# Patient Record
Sex: FEMALE | Race: WHITE | NOT HISPANIC OR LATINO | Employment: OTHER | URBAN - METROPOLITAN AREA
[De-identification: names, ages, dates, MRNs, and addresses within clinical notes are randomized per-mention and may not be internally consistent; named-entity substitution may affect disease eponyms.]

---

## 2017-01-03 ENCOUNTER — HOSPITAL ENCOUNTER (OUTPATIENT)
Dept: GENERAL RADIOLOGY | Facility: HOSPITAL | Age: 67
Discharge: HOME | End: 2017-01-03
Attending: INTERNAL MEDICINE | Admitting: INTERNAL MEDICINE

## 2017-01-03 ENCOUNTER — OFFICE VISIT (OUTPATIENT)
Dept: INTERNAL MEDICINE | Facility: CLINIC | Age: 67
End: 2017-01-03

## 2017-01-03 VITALS
BODY MASS INDEX: 32.29 KG/M2 | SYSTOLIC BLOOD PRESSURE: 144 MMHG | WEIGHT: 218 LBS | DIASTOLIC BLOOD PRESSURE: 70 MMHG | HEIGHT: 69 IN

## 2017-01-03 DIAGNOSIS — S81.802A LEG WOUND, LEFT, INITIAL ENCOUNTER: ICD-10-CM

## 2017-01-03 DIAGNOSIS — I10 ESSENTIAL HYPERTENSION: ICD-10-CM

## 2017-01-03 DIAGNOSIS — S81.802A LEG WOUND, LEFT, INITIAL ENCOUNTER: Primary | ICD-10-CM

## 2017-01-03 DIAGNOSIS — R60.0 BILATERAL EDEMA OF LOWER EXTREMITY: ICD-10-CM

## 2017-01-03 PROCEDURE — 87186 SC STD MICRODIL/AGAR DIL: CPT | Performed by: INTERNAL MEDICINE

## 2017-01-03 PROCEDURE — 87070 CULTURE OTHR SPECIMN AEROBIC: CPT | Performed by: INTERNAL MEDICINE

## 2017-01-03 PROCEDURE — 73610 X-RAY EXAM OF ANKLE: CPT

## 2017-01-03 PROCEDURE — 87205 SMEAR GRAM STAIN: CPT | Performed by: INTERNAL MEDICINE

## 2017-01-03 PROCEDURE — 99214 OFFICE O/P EST MOD 30 MIN: CPT | Performed by: INTERNAL MEDICINE

## 2017-01-03 PROCEDURE — 87147 CULTURE TYPE IMMUNOLOGIC: CPT | Performed by: INTERNAL MEDICINE

## 2017-01-03 PROCEDURE — 73590 X-RAY EXAM OF LOWER LEG: CPT

## 2017-01-03 PROCEDURE — 87077 CULTURE AEROBIC IDENTIFY: CPT | Performed by: INTERNAL MEDICINE

## 2017-01-03 RX ORDER — SULFAMETHOXAZOLE AND TRIMETHOPRIM 800; 160 MG/1; MG/1
1 TABLET ORAL 2 TIMES DAILY
Qty: 28 TABLET | Refills: 0 | Status: SHIPPED | OUTPATIENT
Start: 2017-01-03 | End: 2017-01-17

## 2017-01-03 NOTE — MR AVS SNAPSHOT
Rosalie Amador   1/3/2017 9:15 AM   Office Visit    Dept Phone:  [phone]   Encounter #:  [id number]    Provider:  Lisset Godwin MD   Department:  [facility] INTERNAL MEDICINE AND ENDOCRINOLOGY [place]                Your Full Care Plan              Today's Medication Changes          These changes are accurate as of: 1/3/17 10:07 AM.  If you have any questions, ask your nurse or doctor.               New Medication(s)Ordered:     sulfamethoxazole-trimethoprim 800-160 MG per tablet   Commonly known as:  BACTRIM DS,SEPTRA DS   Take 1 tablet by mouth 2 (Two) Times a Day for 14 days.   Started by:  Lisset Godwin MD            Where to Get Your Medications      These medications were sent to [address] - [phone]  - [phone]   [address] [facility] [address]     Phone:  [phone]     sulfamethoxazole-trimethoprim 800-160 MG per tablet                  Your Updated Medication List          This list is accurate as of: 1/3/17 10:07 AM.  Always use your most recent med list.                aspirin 81 MG tablet       CALCIUM 500 + D PO       CoQ-10 200 MG capsule       lisinopril 20 MG tablet   Commonly known as:  PRINIVIL,ZESTRIL       MULTIVITAMINS PO       sulfamethoxazole-trimethoprim 800-160 MG per tablet   Commonly known as:  BACTRIM DS,SEPTRA DS   Take 1 tablet by mouth 2 (Two) Times a Day for 14 days.       Vitamin D3 1000 UNITS capsule               We Performed the Following     Ambulatory Referral to Wound Clinic     Wound Culture       You Were Diagnosed With        Codes Comments    Leg wound, left, initial encounter    -  Primary ICD-10-CM: S81.802A  ICD-9-CM: 894.0     Essential hypertension     ICD-10-CM: I10  ICD-9-CM: 401.9       Instructions     None    Patient Instructions History      Upcoming Appointments     Visit Type Date Time Department    FOLLOW UP  1/3/2017  9:15 AM [facility]    FOLLOW UP 2017  9:00 AM [facility]    SUBSEQUENT MEDICARE WELLNESS 2017 10:30 AM [facility]    OUTSIDE FACILITY 2017  9:30 AM [facility] [place]      MyChart Signup     [facility] boarding pass allows you to send messages to your doctor, view your test results, renew your prescriptions, schedule appointments, and more. To sign up, go to [employer] and click on the Sign Up Now link in the New User? box. Enter your boarding pass Activation Code exactly as it appears below along with the last four digits of your Social Security Number and your Date of Birth () to complete the sign-up process. If you do not sign up before the expiration date, you must request a new code.    boarding pass Activation Code: [email or web address]  Expires: 2017 10:07 AM    If you have questions, you can email [email or web address] or call [phone] to talk to our boarding pass staff. Remember, [employer] is NOT to be used for urgent needs. For medical emergencies, dial 911.               Other Info from Your Visit           Your Appointments     2017  9:00 AM EST   Follow Up with MAGUE Boyd   [facility] INTERNAL MEDICINE AND ENDOCRINOLOGY [place] (--)    [address]  [facility] [address] [phone]           Arrive 15 minutes prior to appointment.            2017 10:30 AM EST   Subsequent Medicare Wellness with Lisset Godwin MD   [facility] INTERNAL MEDICINE AND ENDOCRINOLOGY [place] (--)    [address]  [facility] [address]   [phone]            2017  9:30 AM EST   Outside Facility with Maciel Sanchez MD   [facility] GASTROENTEROLOGY (--)    [address] [facility] [address]  [facility] [address]   [phone]              Allergies     Benzonatate      Morphine And Related        Reason for Visit     Left ankle wound, swelling and redness           Vital Signs     Blood Pressure  "Height Weight Body Mass Index Smoking Status       144/70 69\" (175.3 cm) 218 lb (98.9 kg) 32.19 kg/m2 Never Smoker       Problems and Diagnoses Noted     High blood pressure    Wound of left leg      Results         "

## 2017-01-03 NOTE — PROGRESS NOTES
"Chief Complaint   Patient presents with   • Left ankle wound, swelling and redness       History of Present Illness  66 y.o.  woman presents for further evaluation of left ankle wound.  HPI started a few months ago, had redness at the inner left ankle, which broke open about 2 mos ago.  Has had some bloody drainage, has had increased redness, has had dev't of scab but wound is nonhealing.  Has applied neosporin and cleaned it.  Notes pain is decreased with leg elevation, antoine in the morning, but pain increases throughout the day.    Review of Systems  Had right wrist fx the week before Thanksgiving.  Fell raking leaves.  Saw Dr. Kinney, braced it, had f/u last week, and can take off brace next week.  All other ROS reviewed and negative.    [facility]  The following portions of the patient's history were reviewed and updated as appropriate: allergies, current medications, past family history, past medical history, past social history, past surgical history and problem list.      Current Outpatient Prescriptions:   •  aspirin 81 MG tablet, Take  by mouth daily., Disp: , Rfl:   •  Calcium Carbonate-Vitamin D (CALCIUM 500 + D PO), Take  by mouth daily., Disp: , Rfl:   •  Cholecalciferol (VITAMIN D3) 1000 UNITS capsule, Take 1,000 Units by mouth daily., Disp: , Rfl:   •  Coenzyme Q10 (COQ-10) 200 MG capsule, Take  by mouth daily., Disp: , Rfl:   •  lisinopril (PRINIVIL,ZESTRIL) 20 MG tablet, Take 20 mg by mouth daily., Disp: , Rfl:   •  Multiple Vitamin (MULTIVITAMINS PO), Take  by mouth daily., Disp: , Rfl:   •  sulfamethoxazole-trimethoprim (BACTRIM DS,SEPTRA DS) 800-160 MG per tablet, Take 1 tablet by mouth 2 (Two) Times a Day for 14 days., Disp: 28 tablet, Rfl: 0    Visit Vitals   • /70   • Ht 69\" (175.3 cm)   • Wt 218 lb (98.9 kg)   • BMI 32.19 kg/m2       Physical Exam   Constitutional: She is oriented to person, place, and time. She appears well-developed and well-nourished.   Cardiovascular: Normal " rate, regular rhythm and normal heart sounds.    Pulmonary/Chest: Effort normal and breath sounds normal.   Abdominal: Soft. Bowel sounds are normal.   Musculoskeletal:   Right wrist/forearm in brace; able to move all fingers FROM; sensation intact to light touch   Neurological: She is alert and oriented to person, place, and time.   Skin: There is erythema (medial left leg just above the medial malleolus with nickel-sized ulcer with yellow base, surrounding thick scab 3/4 circumference and 1-inch diameters of purplish erythema; no significant swelling but tender to palpation; no significant increased warmth; ).   No active drainage from wound   Psychiatric: She has a normal mood and affect. Her behavior is normal.   Nursing note and vitals reviewed.    LABS  Results for orders placed or performed in visit on 01/03/17   Wound Culture   Result Value Ref Range    Gram Stain Result Many (4+) WBCs seen     Gram Stain Result Few (2+) Gram positive cocci in pairs        ASSESSMENT/PLAN  Problem List Items Addressed This Visit     Hypertension     BP mildly elevated, possibly exacerbated by pain from left ankle wound; follow clinically         Bilateral edema of lower extremity     Note previous concern with venous insufficiency, now with concern for potential venous stasis ulcer L. medial lower leg; leg elevation as much as possible; plan for further eval after resolution of leg wound         Leg wound, left - Primary     Consider most likely due to venous stasis ulcer, ongoing for > 2 mos; wound cx taken; empiric abx tx with bactrim DS BID x 14 days; urgent referral to wound clinic for further eval;check xrays for 1st look to r/o osteomyelitis; leg elevation as possible; f/u in 2 weeks; consider vasc surg consult if does not start improving with more aggressive wound care         Relevant Medications    sulfamethoxazole-trimethoprim (BACTRIM DS,SEPTRA DS) 800-160 MG per tablet    Other Relevant Orders    Ambulatory  Referral to Wound Clinic    XR Tibia Fibula 2 View Left (Completed)    Wound Culture (Completed)          FOLLOW-UP  1. Health maintenance - flu vaccine 9/16  2. RTC for next wellness visit as scheduled 1/31/17; fasting labs the week prior to appt (CBC, CMP, TSH, lipids, UA/micro, A1C, microalb, vit D, FT4)      Electronically signed by:    iLsset Godwin MD  01/03/2017

## 2017-01-04 ENCOUNTER — HOSPITAL ENCOUNTER (OUTPATIENT)
Dept: PHYSICAL THERAPY | Facility: HOSPITAL | Age: 67
Setting detail: THERAPIES SERIES
Discharge: HOME | End: 2017-01-04

## 2017-01-04 DIAGNOSIS — L97.329 VENOUS STASIS ULCER OF ANKLE, LEFT (HCC): Primary | ICD-10-CM

## 2017-01-04 DIAGNOSIS — R60.0 BILATERAL LOWER EXTREMITY EDEMA: ICD-10-CM

## 2017-01-04 DIAGNOSIS — I83.023 VENOUS STASIS ULCER OF ANKLE, LEFT (HCC): Primary | ICD-10-CM

## 2017-01-04 PROCEDURE — G8990 OTHER PT/OT CURRENT STATUS: HCPCS

## 2017-01-04 PROCEDURE — G8991 OTHER PT/OT GOAL STATUS: HCPCS

## 2017-01-04 PROCEDURE — 97597 DBRDMT OPN WND 1ST 20 CM/<: CPT

## 2017-01-04 PROCEDURE — 97161 PT EVAL LOW COMPLEX 20 MIN: CPT

## 2017-01-04 PROCEDURE — 29581 APPL MULTLAYER CMPRN SYS LEG: CPT

## 2017-01-04 NOTE — MR AVS SNAPSHOT
Rosalie Amador   2017  9:00 AM   INITIAL EVALUATION - WOUND CARE    Dept Phone:  [phone]   Encounter #:  [id number]    Provider:  Doreen Howell, PT   Department:  [facility] OUTPATIENT PHYSICAL THERAPY                Your Full Care Plan              Your Updated Medication List      ASK your doctor about these medications     aspirin 81 MG tablet       CALCIUM 500 + D PO       CoQ-10 200 MG capsule       lisinopril 20 MG tablet   Commonly known as:  PRINIVIL,ZESTRIL       MULTIVITAMINS PO       sulfamethoxazole-trimethoprim 800-160 MG per tablet   Commonly known as:  BACTRIM DS,SEPTRA DS   Take 1 tablet by mouth 2 (Two) Times a Day for 14 days.       Vitamin D3 1000 UNITS capsule               Instructions     None    Patient Instructions History      Upcoming Appointments     Visit Type Date Time Department    INITIAL EVAL - WOUND CARE 2017  9:00 AM  [facility]    TREATMENT 2017  9:15 AM  [facility]    TREATMENT 2017 10:00 AM  [facility]    FOLLOW UP 2017  9:00 AM [facility]    SUBSEQUENT MEDICARE WELLNESS 2017 10:30 AM [facility]    OUTSIDE FACILITY 2017  9:30 AM [facility]      MyChart Signup     [facility] [employer] allows you to send messages to your doctor, view your test results, renew your prescriptions, schedule appointments, and more. To sign up, go to [email or web address] and click on the Sign Up Now link in the New User? box. Enter your [employer] Activation Code exactly as it appears below along with the last four digits of your Social Security Number and your Date of Birth () to complete the sign-up process. If you do not sign up before the expiration date, you must request a new code.    [employer] Activation Code: [email or web address]  Expires: 2017 10:07 AM    If you have questions, you can email [email or web address] or call [phone] to talk to our [employer] staff.  Remember, MyChart is NOT to be used for urgent needs. For medical emergencies, dial 911.               Other Info from Your Visit           Your Appointments     Jan 09, 2017  9:15 AM EST   Therapy Treatment with Doreen Howell, PT   [facility] OUTPATIENT PHYSICAL THERAPY ([place])    [address] [place] [address] [phone]            Jan 12, 2017 10:00 AM EST   Therapy Treatment with Analy Carnes, PT   [facility] OUTPATIENT PHYSICAL THERAPY ([address] [address] [phone]            Jan 17, 2017  9:00 AM EST   Follow Up with MAGUE Boyd   [facility] INTERNAL MEDICINE AND ENDOCRINOLOGY [place] (--)    [address] [address] [phone]           Arrive 15 minutes prior to appointment.            Jan 31, 2017 10:30 AM EST   Subsequent Medicare Wellness with Lisset Godwin MD   [facility] INTERNAL MEDICINE AND ENDOCRINOLOGY [place] (--)    [address] [facility] [address]  [facility] [address] [phone]            Feb 16, 2017  9:30 AM EST   Outside Facility with Maciel Sanchez MD   [facility] MEDICAL GROUP GASTROENTEROLOGY (--)    [address] [place] [address] [address]  [facility] [address]   [phone]              Allergies     Benzonatate      Morphine And Related        Vital Signs     Smoking Status                   Never Smoker

## 2017-01-04 NOTE — PROGRESS NOTES
Outpatient Rehabilitation - Wound/Debridement Initial Eval  [facility]     Patient Name: Rosalie Amador  : 1950  MRN: [id number]  Today's Date: 2017                Admit Date: 2017    Visit Dx:    ICD-10-CM ICD-9-CM   1. Venous stasis ulcer of ankle, left I83.023 454.0   2. Bilateral lower extremity edema R60.0 782.3       Patient Active Problem List   Diagnosis   • Impaired fasting glucose   • Vitamin D deficiency   • Scalp cyst   • Osteopenia   • Obesity   • Non-toxic multinodular goiter   • Uterine leiomyoma   • Hypertension   • Hyperlipidemia   • Hemorrhoids   • Carotid atherosclerosis   • Malignant neoplasm of right female breast   • Palpitations   • Ecchymosis   • Bilateral edema of lower extremity   • Leg wound, left        Past Medical History   Diagnosis Date   • Cancer    • Right wrist fracture 2016     ortho - Dr. Albin Kinney        Past Surgical History   Procedure Laterality Date   • Breast surgery               Patient History       17 0900          History    Chief Complaint Ulcer, wound or other skin condition;Pain  -      Type of Pain Ankle pain  -      Date Current Problem(s) Began 16  -      Brief Description of Current Complaint Approximately 2 months ago, pt noticed an open area on her L ankle, which has gotten worse despite her attempts to manage at home. Pt followed up with her PCP yesterday, who recommended PT wound care consult to attempt resolution. Pt also reports BLE edema that worsens throughout the day but mostly resolves overnight, LLE worse than RLE.   -      Previous treatment for THIS PROBLEM Medication   Pt is on oral abx.  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  -      Patient seeing anyone else  for problem(s)? Yes   PCP, Dr. Godwin  -      How has patient tried to help current problem? Pt treated with neosporin/bandages at first, then attempted to leave it open to air.  -      What clinical tests have you had for this problem? Other 1 (comment)   wound culture  -      Results of Clinical Tests In progress as of 01/04/2017  -      Related/Recent Hospitalizations No  -      Are you or can you be pregnant No  -      Pain     Pain Location Ankle  -      Pain at Present 5  -MC      Pain at Best 0  -      Pain at Worst 10  -MC      Pain Frequency Intermittent  -      Pain Description Burning  -      What Performance Factors Make the Current Problem(s) WORSE? Activity, tactile input  -      What Performance Factors Make the Current Problem(s) BETTER? rest, elevation  -      Is your sleep disturbed? Yes  -      Difficulties at work? Pt does not work. The area slightly limits her ability to help care for her mother.  -      Difficulties with ADL's? Self care  -      Fall Risk Assessment    Any falls in the past year: Yes  -      Number of falls reported in the last 12 months 1  -      Does patient have a fear of falling No  -      Previous Functional Level --   independent with all activities  -      Services    Are you currently receiving Home Health services No  -      Do you plan to receive Home Health services in the near future No  -      Daily Activities    Primary Language English  -      Are you able to read Yes  -      Are you able to write Yes  -      How does patient learn best? Listening;Demonstration  -      Teaching needs identified Management of Condition  -      Patient is concerned about/has problems with Difficulty with self care (i.e. bathing, dressing, toileting:;Performing home management (household chores, shopping, care of dependents);Performing job responsibilities/community activities (work, school,;Transfers (getting out of a chair,  "bed);Walking  -      Does patient have problems with the following? None  -MC      Barriers to learning None  -MC      Pt Participated in POC and Goals Yes  -MC      Safety    Are you being hurt, hit, or frightened by anyone at home or in your life? No  -MC      Are you being neglected by a caregiver No  -MC        User Key  (r) = Recorded By, (t) = Taken By, (c) = Cosigned By    Initials Name Provider Type    NISHANT Howell, PT Physical Therapist          EVALUATION            LDA Wound       01/04/17 0900          Wound 01/04/17 0900 Left medial ankle ulceration, venous    Wound - Properties Group Date first assessed: 01/04/17  - Time first assessed: 0900  - Side: Left  -MC Orientation: medial  -MC Location: ankle  -MC Type: ulceration, venous  -MC    Wound WDL ex   periwound redness, slight swelling  -MC      Dressing Appearance dry;intact  -MC      Base moist;pink;reddened;yellow;slough   min yellow slough after debridement  -      Periwound Area redness;ecchymotic;swelling  -MC      Edges open;irregular  -MC      Length (cm) 1.2   small area open distally to principle wound, 0.2 x 1 cm  -MC      Width (cm) 1.8  -MC      Depth (cm) 0.2   partially obscured by slough.  -MC      Drainage Characteristics/Odor serosanguineous;no odor  -MC      Drainage Amount moderate  -MC      Picture taken yes  -      Wound Cleaning cleansed with;other (see comments)   skintegrity  -      Wound Interventions debrided;honey  -      Dressing Dressing applied;foam;silver impregnated dressing;low-adherent   mepilex Ag foam, 4\" optifoam gentle border  -      Periwound Care cleansed with pH balanced cleanser;dry periwound area maintained  -        User Key  (r) = Recorded By, (t) = Taken By, (c) = Cosigned By    Initials Name Provider Type    NISHANT Howell, PT Physical Therapist              Lymphedema       01/04/17 0900          Subjective Comments    Subjective Comments Pt reports the edema is worse in " the evenings, and worse when she is up on her feet a lot. It's difficult for her to avoid being on her feet d/t caring for her mother.  -MC      Subjective Pain    Able to rate subjective pain? yes  -MC      Pre-Treatment Pain Level 5  -MC      Post-Treatment Pain Level 7  -      Lymphedema Edema Assessment    Ptting Edema Category By severity  -      Pitting Edema Mild   pt reports they are small because she hasn't been up long.  -      Skin Changes/Observations    Location/Assessment Lower Extremity  -      Lower Extremity Conditions right:;intact;bilateral:;clean;dry  -      Lower Extremity Color/Pigment left:;erythema;purple   around wound only. Remainder BLE: WNL  -MC      Lymphedema Sensation    Lymphedema Sensation Reports RLE:;LLE:   WNL  -      Lymphedema Pulses/Capillary Refill    Lymphedema Pulses/Capillary Refill lower extremity pulses;capillary refill  -      Dorsalis Pedis Pulse right:;left:;+1 diminished  -      Posterior Tibialis Pulse right:;left:;+1 diminished  -      Capillary Refill lower extremity capillary refill  -      Lower Extremity Capillary Refill right:;left:;less than 3 seconds  -      Lymphedema Measurements    Measurement Type(s) Quick Girth  -      Quick Girth Areas Lower extremities  -      LLE Quick Girth (cm)    Met-heads 22.5 cm  -MC      Mid foot 22.5 cm  -MC      Smallest ankle 25.3 cm  -MC      Largest calf 43.5 cm  -MC      Tib tuberosity 47.5 cm  -MC      RLE Quick Girth (cm)    Met-heads 22.7 cm  -MC      Mid foot 22.5 cm  -MC      Smallest ankle 24.7 cm  -MC      Largest calf 49.5 cm  -MC      Tib tuberosity 47.5 cm  -MC      Compression/Skin Care    Compression/Skin Care skin care;wrapping location;bandaging  -      Skin Care washed/dried  -MC      Wrapping Location lower extremity  -MC      Wrapping Location LE left:;foot to knee  -MC      Wrapping Comments Pt wished to hold wraps to RLE at this time.  -MC      Bandage Layers cotton elastic  stocking- single layer (comment size)   size 4 MH/ankle dbl over foot, size 5 ankle/calf  -        User Key  (r) = Recorded By, (t) = Taken By, (c) = Cosigned By    Initials Name Provider Type    NISHANT Howell, PT Physical Therapist          WOUND DEBRIDEMENT  Debridement Site 1  Location- Site 1: L medial ankle  Selective Debridement- Site 1: Wound Surface <20cmsq  Instruments- Site 1: #15, scapel, tweezers  Necrotic Tissue- Site 1: 100 % Pre  Excised Tissue Description- Site 1: moderate, slough, other (comment) (hypertrophic crust)  Residual Necrotic Tissue- Site 1: 15 % post  Bleeding- Site 1: none                 Recommendation and Plan        PT Assessment/Plan       01/04/17 0900          PT Assessment    Functional Limitations Performance in self-care ADL;Limitations in functional capacity and performance;Performance in leisure activities;Limitations in community activities  -      Impairments Integumentary integrity;Pain  -      Assessment Comments Pt presents with a venous stasis ulcer to the L medial ankle x2 months with worsening periwound area. Pt has a wound culture in progress taken by the MD on 01/03/2017. After debridement, the wound bed is about 85% red/pink tissue and about 15% adherent yellow slough, with remaining debridement limited by pain. Pt also with BLE edema that worsens throughout the day and improves overnight/with prolonged elevation. Pt may benefit from light compression to avoid pooling of fluid in the BLE and to promote venous return. Pt will benefit from skilled PT wound care for debridement of necrotic tissue and advanced dressings management.  -      Rehab Potential Good  -      Patient/caregiver participated in establishment of treatment plan and goals Yes  -      Patient would benefit from skilled therapy intervention Yes  -      PT Plan    PT Frequency 2x/week;3x/week  -      Predicted Duration of Therapy Intervention (days/wks) 8 weeks  -      Planned  CPT's? PT EVAL: 05022;PT THER PROC EA 15 MIN: 97638;PT JASON DEBRIDE OPEN WOUND UP TO 20 CM: 93510;PT MULTI LAYER COMP SYS LE;PT NLFU MIST: 22928;PT THER SUPP EA 15 MIN  -      Physical Therapy Interventions (Optional Details) patient/family education;wound care  -      PT Plan Comments Debridement, MLW to LLE 2x/week. Add RLE MLW as needed/appropriate.  -        User Key  (r) = Recorded By, (t) = Taken By, (c) = Cosigned By    Initials Name Provider Type     Doreen Howell, PT Physical Therapist            Goals      First Last   PT Goal Re-Cert Due Date: 17  PT Goal Re-Cert Due Date: 17   PT Short Term Goals  STG 1: Patient and/ or caregiver able to verbalize signs and symptoms of infection.  STG 2: Decrease wound dimensions by 25% as evidence of wound closure.  STG 3: Decrease non-viable / necrotic tissue by 90% to facilitate clean wound bed for healing.  STG 4: Patient to report decrease in pain to 4/10, to facilitate improved functional mobility.  STG 5: Patient independent and compliant with initial home exercise program focused on range of motion, and flexibility to improve blood flow to facilitate healing. PT Short Term Goals  STG 1: Patient and/ or caregiver able to verbalize signs and symptoms of infection.  STG 2: Decrease wound dimensions by 25% as evidence of wound closure.  STG 3: Decrease non-viable / necrotic tissue by 90% to facilitate clean wound bed for healing.  STG 4: Patient to report decrease in pain to 4/10, to facilitate improved functional mobility.  STG 5: Patient independent and compliant with initial home exercise program focused on range of motion, and flexibility to improve blood flow to facilitate healing.   Long Term Goals  LTG 1: Patient and/ or caregiver independent with clean dressing changes.  LTG 2: Decrease wound dimensions by 75% as evidence of wound closure.  LTG 3: Patient to report decrease in pain to 2/10, to facilitate improved functional  mobility.  LTG 4: Patient independent and compliant with use and care of compression stockings, with assistance of family / caregiver as indicated to promote self-care independence.  Long Term Goals  LTG 1: Patient and/ or caregiver independent with clean dressing changes.  LTG 2: Decrease wound dimensions by 75% as evidence of wound closure.  LTG 3: Patient to report decrease in pain to 2/10, to facilitate improved functional mobility.  LTG 4: Patient independent and compliant with use and care of compression stockings, with assistance of family / caregiver as indicated to promote self-care independence.                   PT OP Goals       01/04/17 0900          PT Short Term Goals    STG 1 Patient and/ or caregiver able to verbalize signs and symptoms of infection.  -      STG 2 Decrease wound dimensions by 25% as evidence of wound closure.  -      STG 3 Decrease non-viable / necrotic tissue by 90% to facilitate clean wound bed for healing.  -      STG 4 Patient to report decrease in pain to 4/10, to facilitate improved functional mobility.  -      STG 5 Patient independent and compliant with initial home exercise program focused on range of motion, and flexibility to improve blood flow to facilitate healing.  -      Long Term Goals    LTG 1 Patient and/ or caregiver independent with clean dressing changes.  -      LTG 2 Decrease wound dimensions by 75% as evidence of wound closure.  -      LTG 3 Patient to report decrease in pain to 2/10, to facilitate improved functional mobility.  -      LTG 4 Patient independent and compliant with use and care of compression stockings, with assistance of family / caregiver as indicated to promote self-care independence.  -      Time Calculation    PT Goal Re-Cert Due Date 02/03/17  -        User Key  (r) = Recorded By, (t) = Taken By, (c) = Cosigned By    Initials Name Provider Type    NISHANT Howell, PT Physical Therapist          Time Calculation:  Start Time: 0900  Total Timed Code Minutes- PT: 15 minute(s)    Therapy Charges for Today     Code Description Service Date Service Provider Modifiers Qty    54216889261 HC PT OTHER PRIME FUNCT CURRENT 1/4/2017 Doreen Howell, PT GP, CI 1    98523046101 HC PT OTHER PRIME FUNCT PROJECTED 1/4/2017 Doreen Howell, PT GP, CH 1    22676691422 HC PT EVAL LOW COMPLEXITY 4 1/4/2017 Doreen Howell, PT GP 1    52505609513 HC JASON DEBRIDE OPEN WOUND UP TO 20CM 1/4/2017 Doreen Howell, PT GP 1    41262813514 HC PT MULTI LAYER COMP SYS BELOW KNEE 1/4/2017 Doreen Howell, PT GP 1    40817988479 HC PT THER SUPP EA 15 MIN 1/4/2017 Doreen Howell, PT GP 1          PT G-Codes  PT Professional Judgement Used?: Yes  Outcome Measure Options: Lower Extremity Functional Scale (LEFS)  Score: 70/80  Functional Limitation: Other PT primary  Other PT Primary Current Status (): At least 1 percent but less than 20 percent impaired, limited or restricted  Other PT Primary Goal Status (): 0 percent impaired, limited or restricted     Doreen Howell, PT  1/4/2017

## 2017-01-04 NOTE — PROGRESS NOTES
Arthritis changes seen in the midfoot but no other abnormalities seen.  Proceed with plans for wound clinic, abx, and leg elevation as discussed.

## 2017-01-04 NOTE — ASSESSMENT & PLAN NOTE
Consider most likely due to venous stasis ulcer, ongoing for > 2 mos; wound cx taken; empiric abx tx with bactrim DS BID x 14 days; urgent referral to wound clinic for further eval;check xrays for 1st look to r/o osteomyelitis; leg elevation as possible; f/u in 2 weeks; consider vasc surg consult if does not start improving with more aggressive wound care

## 2017-01-04 NOTE — ASSESSMENT & PLAN NOTE
Note previous concern with venous insufficiency, now with concern for potential venous stasis ulcer L. medial lower leg; leg elevation as much as possible; plan for further eval after resolution of leg wound

## 2017-01-07 LAB
BACTERIA SPEC AEROBE CULT: ABNORMAL
GRAM STN SPEC: ABNORMAL
GRAM STN SPEC: ABNORMAL

## 2017-01-09 ENCOUNTER — APPOINTMENT (OUTPATIENT)
Dept: PHYSICAL THERAPY | Facility: HOSPITAL | Age: 67
End: 2017-01-09

## 2017-01-09 NOTE — PROGRESS NOTES
Wound cx showed light growth of bacteria commonly found on the skin; it is responsive to bactrim so please continue and finish abx course.    Has wound clinic been setup? Is wound stabilized and/or getting better?

## 2017-01-12 ENCOUNTER — HOSPITAL ENCOUNTER (OUTPATIENT)
Dept: PHYSICAL THERAPY | Facility: HOSPITAL | Age: 67
Setting detail: THERAPIES SERIES
Discharge: HOME | End: 2017-01-12

## 2017-01-12 DIAGNOSIS — R60.0 BILATERAL LOWER EXTREMITY EDEMA: ICD-10-CM

## 2017-01-12 DIAGNOSIS — L97.329 VENOUS STASIS ULCER OF ANKLE, LEFT (HCC): Primary | ICD-10-CM

## 2017-01-12 DIAGNOSIS — I83.023 VENOUS STASIS ULCER OF ANKLE, LEFT (HCC): Primary | ICD-10-CM

## 2017-01-12 PROCEDURE — 97597 DBRDMT OPN WND 1ST 20 CM/<: CPT

## 2017-01-12 PROCEDURE — 29581 APPL MULTLAYER CMPRN SYS LEG: CPT

## 2017-01-12 NOTE — PROGRESS NOTES
Outpatient Rehabilitation - Wound/Debridement Treatment Note  [facility]     Patient Name: Rosalie Amador  : 1950  MRN: [id number]  Today's Date: 2017                    Admit Date: 2017    Visit Dx:    ICD-10-CM ICD-9-CM   1. Venous stasis ulcer of ankle, left I83.023 454.0   2. Bilateral lower extremity edema R60.0 782.3       Patient Active Problem List   Diagnosis   • Impaired fasting glucose   • Vitamin D deficiency   • Scalp cyst   • Osteopenia   • Obesity   • Non-toxic multinodular goiter   • Uterine leiomyoma   • Hypertension   • Hyperlipidemia   • Hemorrhoids   • Carotid atherosclerosis   • Malignant neoplasm of right female breast   • Palpitations   • Ecchymosis   • Bilateral edema of lower extremity   • Leg wound, left        Past Medical History   Diagnosis Date   • Cancer    • Right wrist fracture 2016     ortho - Dr. Albin Kinney        Past Surgical History   Procedure Laterality Date   • Breast surgery           EVALUATION        PT Ortho       17 1000    Subjective Comments    Subjective Comments Pt reports her leg feels a lot better since wearing compressogrip stockings.  She states she was not able to change the dressing due to a family emergency, and current dressing has been in place for 3 days.  -JM    Subjective Pain    Able to rate subjective pain? yes  -BOB    Pre-Treatment Pain Level 3  -    Post-Treatment Pain Level 5  -JM    Transfers    Transfer, Comment Pt long sitting on stretcher for tx.  -      User Key  (r) = Recorded By, (t) = Taken By, (c) = Cosigned By    Initials Name Provider Type    BOB Carnes, PT Physical Therapist                    LDA Wound       17 1000          Wound 17 0900 Left medial ankle ulceration, venous    Wound - Properties Group Date first assessed: 17  - Time first assessed: 900  - Side: Left  - Orientation: medial  - Location: ankle  - Type: ulceration, venous  -MC    Wound WDL ex  "  periwound redness, slight swelling  -      Dressing Appearance dry;intact  -      Base moist;pink;reddened;yellow;slough  -      Periwound Area redness;ecchymotic;swelling  -      Edges open;irregular  -JM      Length (cm) 0.7  -JM      Width (cm) 1.3  -JM      Depth (cm) 0.1  -JM      Drainage Characteristics/Odor serosanguineous;no odor  -      Drainage Amount moderate  -      Picture taken yes  -      Wound Cleaning cleansed with;other (see comments);irrigated with;sterile normal saline   Phase One wound  after debridement  -      Wound Interventions debrided;honey  -      Dressing Dressing applied;foam;silver impregnated dressing   mepilex ag foam, 4\" optifoam gentle  -      Periwound Care cleansed with pH balanced cleanser;dry periwound area maintained;moisturizer applied  -        User Key  (r) = Recorded By, (t) = Taken By, (c) = Cosigned By    Initials Name Provider Type     Doreen Howell, PT Physical Therapist     Analy Carnes, PT Physical Therapist              Lymphedema       01/12/17 1000          Lymphedema Edema Assessment    Ptting Edema Category By severity  -      Pitting Edema Mild  -      Skin Changes/Observations    Location/Assessment Lower Extremity  -      Lower Extremity Color/Pigment left:;erythema;purple  -      Lymphedema Pulses/Capillary Refill    Lower Extremity Capillary Refill right:;left:;less than 3 seconds  -      LLE Quick Girth (cm)    Met-heads 22 cm  -JM      Mid foot 22.5 cm  -      Smallest ankle 21.5 cm  -      Largest calf 43.3 cm  -      Tib tuberosity 45.8 cm  -      RLE Quick Girth (cm)    Met-heads 22.6 cm  -      Mid foot 21.6 cm  -JM      Smallest ankle 24.2 cm  -      Largest calf 46 cm  -      Tib tuberosity 46 cm  -      Compression/Skin Care    Compression/Skin Care skin care;wrapping location;bandaging  -      Skin Care washed/dried;lotion applied  -      Wrapping Location lower extremity  " -      Wrapping Location LE bilateral:;foot to knee  -      Bandage Layers cotton elastic stocking- single layer (comment size)   size 5 on calf, size 4 on foot/ankle, doubled on dorsum/ank  -        User Key  (r) = Recorded By, (t) = Taken By, (c) = Cosigned By    Initials Name Provider Type    BOB Carnes, PT Physical Therapist          WOUND DEBRIDEMENT  Debridement Site 1  Location- Site 1: L medial ankle  Selective Debridement- Site 1: Wound Surface <20cmsq  Instruments- Site 1: tweezers  Necrotic Tissue- Site 1: 80 % Pre  Excised Tissue Description- Site 1: moderate, slough  Residual Necrotic Tissue- Site 1: 20 % post  Bleeding- Site 1: none             Recommendation and Plan        PT Assessment/Plan       01/12/17 1000          PT Assessment    Functional Limitations Performance in self-care ADL;Limitations in functional capacity and performance;Performance in leisure activities;Limitations in community activities  -      Impairments Integumentary integrity;Pain  -      Assessment Comments Patient with improved pain and improvement in wound appearance, decrease in wound area.  Pt tolerating use of compressogrip stockings.  Pt would benefit from knee high compression stockings for BLE venous stasis.  PT recommended obtaining MD prescription for knee high stockings as insurance will typically pay for stockings if pt has an open venous ulcer.  -      Rehab Potential Good  -      Patient/caregiver participated in establishment of treatment plan and goals Yes  -      Patient would benefit from skilled therapy intervention Yes  -      PT Plan    PT Frequency 2x/week  -      Physical Therapy Interventions (Optional Details) patient/family education;wound care  -      PT Plan Comments L ankle debridement, MLW to BLE  -        User Key  (r) = Recorded By, (t) = Taken By, (c) = Cosigned By    Initials Name Provider Type    BOB Carnes, PT Physical Therapist          Goals         PT OP Goals       01/12/17 1000 01/04/17 0900       PT Short Term Goals    STG 1  Patient and/ or caregiver able to verbalize signs and symptoms of infection.  -     STG 2  Decrease wound dimensions by 25% as evidence of wound closure.  -MC     STG 3  Decrease non-viable / necrotic tissue by 90% to facilitate clean wound bed for healing.  -     STG 4  Patient to report decrease in pain to 4/10, to facilitate improved functional mobility.  -     STG 5  Patient independent and compliant with initial home exercise program focused on range of motion, and flexibility to improve blood flow to facilitate healing.  -     Long Term Goals    LTG 1  Patient and/ or caregiver independent with clean dressing changes.  -MC     LTG 2  Decrease wound dimensions by 75% as evidence of wound closure.  -     LTG 3  Patient to report decrease in pain to 2/10, to facilitate improved functional mobility.  -     LTG 4  Patient independent and compliant with use and care of compression stockings, with assistance of family / caregiver as indicated to promote self-care independence.  -     Time Calculation    PT Goal Re-Cert Due Date 02/03/17  -BOB 02/03/17  -       User Key  (r) = Recorded By, (t) = Taken By, (c) = Cosigned By    Initials Name Provider Type    NISHANT Howell, PT Physical Therapist    BOB Carnes, PT Physical Therapist          PT Goal Re-Cert Due Date: 02/03/17            Time Calculation: Start Time: 1000    Therapy Charges for Today     Code Description Service Date Service Provider Modifiers Qty    46059815646  PT MULTI LAYER COMP SYS BELOW KNEE 1/12/2017 Analy Carnes, PT GP 1    [id number]  JASON DEBRIDE OPEN WOUND UP TO 20CM 1/12/2017 Analy Carnes, PT 58, GP 1    [id number]  PT THER SUPP EA 15 MIN 1/12/2017 Analy Carnes, PT GP 1                Analy Carnes, PT  1/12/2017

## 2017-01-12 NOTE — MR AVS SNAPSHOT
Rosalie Amador   2017 10:00 AM   Therapy Treatment    Dept Phone:  [phone]   Encounter #:  [id number]    Provider:  Analy Carnes PT   Department:  [facility] OUTPATIENT PHYSICAL THERAPY                Your Full Care Plan              Your Updated Medication List      ASK your doctor about these medications     aspirin 81 MG tablet       CALCIUM 500 + D PO       CoQ-10 200 MG capsule       lisinopril 20 MG tablet   Commonly known as:  PRINIVIL,ZESTRIL       MULTIVITAMINS PO       sulfamethoxazole-trimethoprim 800-160 MG per tablet   Commonly known as:  BACTRIM DS,SEPTRA DS   Take 1 tablet by mouth 2 (Two) Times a Day for 14 days.       Vitamin D3 1000 UNITS capsule               Instructions     None    Patient Instructions History      Upcoming Appointments     Visit Type Date Time Department    TREATMENT 2017 10:00 AM  [facility]    FOLLOW UP 2017  9:00 AM [facility]    TREATMENT 2017 10:30 AM  [facility]    TREATMENT 2017 10:30 AM  [facility]    SUBSEQUENT MEDICARE WELLNESS 2017 10:30 AM [facility] [place]    OUTSIDE FACILITY 2017  9:30 AM [facility] [place]      MyChart Signup     [facility] [employer] allows you to send messages to your doctor, view your test results, renew your prescriptions, schedule appointments, and more. To sign up, go to [email or web address] and click on the Sign Up Now link in the New User? box. Enter your [employer] Activation Code exactly as it appears below along with the last four digits of your Social Security Number and your Date of Birth () to complete the sign-up process. If you do not sign up before the expiration date, you must request a new code.    [employer]ves Activation Code: [email or web address]  Expires: 2017  5:36 AM    If you have questions, you can email [email or web address] or call [phone] to talk to our [employer] staff. Remember, [employer] is NOT to  be used for urgent needs. For medical emergencies, dial 911.               Other Info from Your Visit           Your Appointments     Jan 17, 2017  9:00 AM EST   Follow Up with MAGUE Boyd   [facility] INTERNAL MEDICINE AND ENDOCRINOLOGY [place] (--)    [address]  [facility] [address] [phone]           Arrive 15 minutes prior to appointment.            Jan 17, 2017 10:30 AM EST   Therapy Treatment with Analy Carnes, PT   [facility] OUTPATIENT PHYSICAL THERAPY ([place])    [address] [place], [place] [address] [phone]            Jan 19, 2017 10:30 AM EST   Therapy Treatment with Analy Carnes, PT   [facility] OUTPATIENT PHYSICAL THERAPY ([place])    [address] [place] [address] [phone]            Jan 31, 2017 10:30 AM EST   Subsequent Medicare Wellness with Lisset Godwin MD   [facility] INTERNAL MEDICINE AND ENDOCRINOLOGY [place] (--)    [address]  [facility] [address] [phone]            Feb 16, 2017  9:30 AM EST   Outside Facility with Maciel Sanchez MD   [facility] MEDICAL GROUP GASTROENTEROLOGY (--)    [address]  [facility] [address]   [phone]              Allergies     Benzonatate      Morphine And Related        Vital Signs     Smoking Status                   Never Smoker

## 2017-01-17 ENCOUNTER — HOSPITAL ENCOUNTER (OUTPATIENT)
Dept: PHYSICAL THERAPY | Facility: HOSPITAL | Age: 67
Setting detail: THERAPIES SERIES
Discharge: HOME | End: 2017-01-17

## 2017-01-17 DIAGNOSIS — L97.329 VENOUS STASIS ULCER OF ANKLE, LEFT (HCC): Primary | ICD-10-CM

## 2017-01-17 DIAGNOSIS — I83.023 VENOUS STASIS ULCER OF ANKLE, LEFT (HCC): Primary | ICD-10-CM

## 2017-01-17 DIAGNOSIS — R60.0 BILATERAL LOWER EXTREMITY EDEMA: ICD-10-CM

## 2017-01-17 PROCEDURE — 97597 DBRDMT OPN WND 1ST 20 CM/<: CPT

## 2017-01-17 PROCEDURE — 29581 APPL MULTLAYER CMPRN SYS LEG: CPT

## 2017-01-17 NOTE — PROGRESS NOTES
Outpatient Rehabilitation - Wound/Debridement Treatment Note  [facility]     Patient Name: Rosalie Amador  : 1950  MRN: [id number]  Today's Date: 2017                    Admit Date: 2017    Visit Dx:    ICD-10-CM ICD-9-CM   1. Venous stasis ulcer of ankle, left I83.023 454.0   2. Bilateral lower extremity edema R60.0 782.3       Patient Active Problem List   Diagnosis   • Impaired fasting glucose   • Vitamin D deficiency   • Scalp cyst   • Osteopenia   • Obesity   • Non-toxic multinodular goiter   • Uterine leiomyoma   • Hypertension   • Hyperlipidemia   • Hemorrhoids   • Carotid atherosclerosis   • Malignant neoplasm of right female breast   • Palpitations   • Ecchymosis   • Bilateral edema of lower extremity   • Leg wound, left        Past Medical History   Diagnosis Date   • Cancer    • Right wrist fracture 2016     ortho - Dr. Albin Kinney        Past Surgical History   Procedure Laterality Date   • Breast surgery           EVALUATION        PT Ortho       17 1040    Subjective Comments    Subjective Comments Pt states her legs are feeling better since using compressogrip.  She reports she has a follow-up appointment with her PCP next week, will try to obtain a script for compression stockings to take to [facility].  Otherwise, no new complaints.  States she is having periods where she is pain-free in the left leg.  -BOB    Subjective Pain    Able to rate subjective pain? yes  -BOB    Pre-Treatment Pain Level 2  -BOB    Post-Treatment Pain Level 5  -BOB    Transfers    Transfer, Comment Pt long sitting on stretcher for tx.  -      User Key  (r) = Recorded By, (t) = Taken By, (c) = Cosigned By    Initials Name Provider Type    BOB Carnes, PT Physical Therapist                    [facility] Wound       17 1040          Wound 17 0900 Left medial ankle ulceration, venous    Wound - Properties Group Date first assessed: 17  - Time first assessed: 900  - Side:  "Left  - Orientation: medial  - Location: ankle  - Type: ulceration, venous  -    Wound WDL ex   periwound redness, slight swelling  -      Dressing Appearance dry;intact  -JM      Base moist;pink;reddened;yellow;slough;epithelialization   biofilm on surface, fibrous yellow tissue in base  -JM      Periwound Area redness;ecchymotic;swelling  -JM      Edges open;irregular  -JM      Length (cm) 0.9  -JM      Width (cm) 1.4  -JM      Depth (cm) 0.4   apparent increase due to debriding deeper aspect of wound  -JM      Drainage Characteristics/Odor serosanguineous;no odor  -JM      Drainage Amount small  -JM      Picture taken yes  -JM      Wound Cleaning cleansed with;other (see comments)   Phase One wound   -      Wound Interventions debrided;honey  -      Dressing Dressing applied;foam;silver impregnated dressing;low-adherent   mepilex ag foam, 4\" optifoam gentle border  -JM      Periwound Care cleansed with pH balanced cleanser;dry periwound area maintained  -        User Key  (r) = Recorded By, (t) = Taken By, (c) = Cosigned By    Initials Name Provider Type     Doreen Howell, PT Physical Therapist    JM Analy Carnes, PT Physical Therapist              Lymphedema       01/17/17 1040          Lymphedema Edema Assessment    Ptting Edema Category By severity  -      Pitting Edema Mild  -      Skin Changes/Observations    Lower Extremity Conditions bilateral:;clean;dry  -      Lower Extremity Color/Pigment left:;erythema;purple  -      Lymphedema Pulses/Capillary Refill    Lower Extremity Capillary Refill left:;less than 3 seconds  -      Compression/Skin Care    Skin Care washed/dried;lotion applied  -      Wrapping Location lower extremity  -      Wrapping Location LE left:;foot to knee  -      Bandage Layers cotton elastic stocking- single layer (comment size);cotton elastic stocking- double layer (comment size)   size 5 single on calf, size 4 double on foot/ankle  -JM "        User Key  (r) = Recorded By, (t) = Taken By, (c) = Cosigned By    Initials Name Provider Type    BOB Carnes, PT Physical Therapist          WOUND DEBRIDEMENT  Debridement Site 1  Location- Site 1: L medial ankle  Selective Debridement- Site 1: Wound Surface <20cmsq  Instruments- Site 1: tweezers, #15, scapel  Necrotic Tissue- Site 1: 80 % Pre  Excised Tissue Description- Site 1: moderate, slough  Residual Necrotic Tissue- Site 1: 40 % post (fibrous yellow tissue in base after biofilm debrided)  Bleeding- Site 1: seeping, held pressure, 1 minute             Recommendation and Plan        PT Assessment/Plan       01/17/17 1040 01/12/17 1000       PT Assessment    Functional Limitations Performance in self-care ADL;Limitations in functional capacity and performance;Performance in leisure activities;Limitations in community activities  - Performance in self-care ADL;Limitations in functional capacity and performance;Performance in leisure activities;Limitations in community activities  -     Impairments Integumentary integrity;Pain  - Integumentary integrity;Pain  -     Assessment Comments PT able to debride more slough today, pt still very painful but tolerant with rest breaks.  Posterior aspect is clean and pink with new epithelial growth, anterior aspect still deep with yellow fibrous tissue in base.  Pt making good progress with current tx.  Is to follow up with PCP next week and request script for compression stockings.  -BOB Patient with improved pain and improvement in wound appearance, decrease in wound area.  Pt tolerating use of compressogrip stockings.  Pt would benefit from knee high compression stockings for BLE venous stasis.  PT recommended obtaining MD prescription for knee high stockings as insurance will typically pay for stockings if pt has an open venous ulcer.  -BOB     Rehab Potential Good  -BOB Good  -BOB     Patient/caregiver participated in establishment of treatment plan  and goals Yes  -JM Yes  -JM     Patient would benefit from skilled therapy intervention Yes  -JM Yes  -JM     PT Plan    PT Frequency 2x/week  -JM 2x/week  -JM     Planned CPT's? PT JASON DEBRIDE OPEN WOUND UP TO 20 CM: 35439;PT MULTI LAYER COMP SYS LE;PT THER SUPP EA 15 MIN  -BOB      Physical Therapy Interventions (Optional Details) patient/family education;wound care  - patient/family education;wound care  -     PT Plan Comments Pt is caring for her mother and reports she may not be able to keep Tuesday's appt but will call to cancel if needed.  -JM L ankle debridement, MLW to BLE  -JM       User Key  (r) = Recorded By, (t) = Taken By, (c) = Cosigned By    Initials Name Provider Type    BOB Carnes, PT Physical Therapist          Goals        PT OP Goals       17 1040 17 1000 17 0900    PT Short Term Goals    STG 1   Patient and/ or caregiver able to verbalize signs and symptoms of infection.  -    STG 2   Decrease wound dimensions by 25% as evidence of wound closure.  -    STG 3   Decrease non-viable / necrotic tissue by 90% to facilitate clean wound bed for healing.  -    STG 4   Patient to report decrease in pain to 4/10, to facilitate improved functional mobility.  -    STG 5   Patient independent and compliant with initial home exercise program focused on range of motion, and flexibility to improve blood flow to facilitate healing.  -    Long Term Goals    LTG 1   Patient and/ or caregiver independent with clean dressing changes.  -MC    LTG 2   Decrease wound dimensions by 75% as evidence of wound closure.  -    LTG 3   Patient to report decrease in pain to 2/10, to facilitate improved functional mobility.  -    LTG 4   Patient independent and compliant with use and care of compression stockings, with assistance of family / caregiver as indicated to promote self-care independence.  -    Time Calculation    PT Goal Re-Cert Due Date 17  -JM 17   -BOB 02/03/17  -      User Key  (r) = Recorded By, (t) = Taken By, (c) = Cosigned By    Initials Name Provider Type    NISHANT Howell, PT Physical Therapist    BOB Carnes, PT Physical Therapist          PT Goal Re-Cert Due Date: 02/03/17            Time Calculation: Start Time: 1040    Therapy Charges for Today     Code Description Service Date Service Provider Modifiers Qty    76336436399 HC PT MULTI LAYER COMP SYS BELOW KNEE 1/17/2017 Analy Carnes, PT GP 1    09892308913 HC JASON DEBRIDE OPEN WOUND UP TO 20CM 1/17/2017 Analy Carnes, PT 58, GP 1    25892208471 HC PT THER SUPP EA 15 MIN 1/17/2017 Analy Carnes, PT GP 1                Analy Carnes, PT  1/17/2017

## 2017-01-17 NOTE — MR AVS SNAPSHOT
Rosalie Amador   2017 10:30 AM   Therapy Treatment    Dept Phone:  [phone]   Encounter #:  [id number]    Provider:  Analy Carnes, PT   Department:  [facility] OUTPATIENT PHYSICAL THERAPY                Your Full Care Plan              Your Updated Medication List      ASK your doctor about these medications     aspirin 81 MG tablet       CALCIUM 500 + D PO       CoQ-10 200 MG capsule       lisinopril 20 MG tablet   Commonly known as:  PRINIVIL,ZESTRIL       MULTIVITAMINS PO       sulfamethoxazole-trimethoprim 800-160 MG per tablet   Commonly known as:  BACTRIM DS,SEPTRA DS   Take 1 tablet by mouth 2 (Two) Times a Day for 14 days.       Vitamin D3 1000 UNITS capsule               Instructions     None    Patient Instructions History      Upcoming Appointments     Visit Type Date Time Department    TREATMENT 2017 10:30 AM  [facility]    TREATMENT 2017 10:30 AM  [facility]    TREATMENT 2017  2:45 PM  [facility]    FOLLOW UP 2017 10:45 AM [facility]    TREATMENT 2017  2:00 PM  [facility]    SUBSEQUENT MEDICARE WELLNESS 2017 10:30 AM [facility]    OUTSIDE FACILITY 2017  9:30 AM [facility] [place]      MyChart Signup     [facility] [employer] allows you to send messages to your doctor, view your test results, renew your prescriptions, schedule appointments, and more. To sign up, go to [email or web address] and click on the Sign Up Now link in the New User? box. Enter your [employer] Activation Code exactly as it appears below along with the last four digits of your Social Security Number and your Date of Birth () to complete the sign-up process. If you do not sign up before the expiration date, you must request a new code.    [employer] Activation Code: [email or web address]  Expires: 2017  5:36 AM    If you have questions, you can email [email or web address] or call [phone] to talk  to our MyChart staff. Remember, [employer] is NOT to be used for urgent needs. For medical emergencies, dial 911.               Other Info from Your Visit           Your Appointments     Jan 19, 2017 10:30 AM EST   Therapy Treatment with Analy Carnes, PT   [facility] OUTPATIENT PHYSICAL THERAPY ([place])    [address] [place], [place] [address] [phone]            Jan 24, 2017  2:45 PM EST   Therapy Treatment with Analy Carnes PT   [facility] OUTPATIENT PHYSICAL THERAPY ([place] [id number] [phone]            Jan 26, 2017 10:45 AM EST   Follow Up with Lisset Godwin MD   [facility] MEDICINE [facility]OCRINOLOGY [place] (--)    [address] [facility] [address] [place], [place] [address] [phone]           Arrive 15 minutes prior to appointment.            Jan 26, 2017  2:00 PM EST   Therapy Treatment with Analy Carnes PT   [facility] OUTPATIENT PHYSICAL THERAPY ([place])    [address] [place], [place] [address] [phone]            Jan 31, 2017 10:30 AM EST   Subsequent Medicare Wellness with Lisset Godwin MD   [facility] MEDICINE AND [facility]OCRINOLOGY [place] (--)    [address] [facility] [address] [place] [address] [phone]              Allergies     Benzonatate      Morphine And Related        Vital Signs     Smoking Status                   Never Smoker

## 2017-01-19 ENCOUNTER — HOSPITAL ENCOUNTER (OUTPATIENT)
Dept: PHYSICAL THERAPY | Facility: HOSPITAL | Age: 67
Setting detail: THERAPIES SERIES
Discharge: HOME | End: 2017-01-19

## 2017-01-19 DIAGNOSIS — L97.329 VENOUS STASIS ULCER OF ANKLE, LEFT (HCC): Primary | ICD-10-CM

## 2017-01-19 DIAGNOSIS — R60.0 BILATERAL LOWER EXTREMITY EDEMA: ICD-10-CM

## 2017-01-19 DIAGNOSIS — I83.023 VENOUS STASIS ULCER OF ANKLE, LEFT (HCC): Primary | ICD-10-CM

## 2017-01-19 PROCEDURE — 97597 DBRDMT OPN WND 1ST 20 CM/<: CPT

## 2017-01-19 NOTE — MR AVS SNAPSHOT
Rosalie Amador   2017 10:30 AM   Therapy Treatment    Dept Phone:  [phone]   Encounter #:  [id number]    Provider:  Analy Carnes PT   Department:  [facility] OUTPATIENT PHYSICAL THERAPY                Your Full Care Plan              Your Updated Medication List      ASK your doctor about these medications     aspirin 81 MG tablet       CALCIUM 500 + D PO       CoQ-10 200 MG capsule       lisinopril 20 MG tablet   Commonly known as:  PRINIVIL,ZESTRIL       MULTIVITAMINS PO       Vitamin D3 1000 UNITS capsule               Instructions     None    Patient Instructions History      Upcoming Appointments     Visit Type Date Time Department    TREATMENT 2017 10:30 AM [facility]    TREATMENT 2017  2:45 PM [facility]    FOLLOW UP 2017 10:45 AM [facility]    TREATMENT 2017  2:00 PM [facility]    SUBSEQUENT MEDICARE WELLNESS 2017 10:30 AM [facility]    OUTSIDE FACILITY 2017  9:30 AM [facility] [place]      MyChart Signup     [facility] [employer] allows you to send messages to your doctor, view your test results, renew your prescriptions, schedule appointments, and more. To sign up, go to [employer] and click on the Sign Up Now link in the New User? box. Enter your [employer] Activation Code exactly as it appears below along with the last four digits of your Social Security Number and your Date of Birth () to complete the sign-up process. If you do not sign up before the expiration date, you must request a new code.    [employer] Activation Code: [email or web address]  Expires: 2017  5:36 AM    If you have questions, you can email [email or web address] or call [phone] to talk to our [employer] staff. Remember, [employer] is NOT to be used for urgent needs. For medical emergencies, dial 911.               Other Info from Your Visit           Your Appointments     2017  2:45 PM EST    Therapy Treatment with Analy Carnes, PT   [facility] OUTPATIENT PHYSICAL THERAPY ([place])    [address] [facility] [address] [address] [phone]            Jan 26, 2017 10:45 AM EST   Follow Up with Lisset Godwin MD   [facility] INTERNAL MEDICINE AND ENDOCRINOLOGY [place] (--)    [address] [facility] [address]  [facility] [address] [phone]           Arrive 15 minutes prior to appointment.            Jan 26, 2017  2:00 PM EST   Therapy Treatment with Analy Carnes, PT   [facility] OUTPATIENT PHYSICAL THERAPY ([place])    [address] [place] [address] [phone]            Jan 31, 2017 10:30 AM EST   Subsequent Medicare Wellness with Lisset Godwin MD   [facility] INTERNAL MEDICINE AND ENDOCRINOLOGY [place] (--)    [address] [facility] [address]  [facility] [address] [phone]            Feb 16, 2017  9:30 AM EST   Outside Facility with Maciel Sanchez MD   [facility] MEDICAL GROUP GASTROENTEROLOGY (--)    [address] [facility]  [facility] [address]   [phone]              Allergies     Benzonatate      Morphine And Related        Vital Signs     Smoking Status                   Never Smoker

## 2017-01-19 NOTE — PROGRESS NOTES
Outpatient Rehabilitation - Wound/Debridement Treatment Note  [facility]     Patient Name: Rosalie Amador  : 1950  MRN: [id number]  Today's Date: 2017                Admit Date: 2017    Visit Dx:    ICD-10-CM ICD-9-CM   1. Venous stasis ulcer of ankle, left I83.023 454.0   2. Bilateral lower extremity edema R60.0 782.3       Patient Active Problem List   Diagnosis   • Impaired fasting glucose   • Vitamin D deficiency   • Scalp cyst   • Osteopenia   • Obesity   • Non-toxic multinodular goiter   • Uterine leiomyoma   • Hypertension   • Hyperlipidemia   • Hemorrhoids   • Carotid atherosclerosis   • Malignant neoplasm of right female breast   • Palpitations   • Ecchymosis   • Bilateral edema of lower extremity   • Leg wound, left        Past Medical History   Diagnosis Date   • Cancer    • Right wrist fracture 2016     ortho - Dr. Albin Kinney        Past Surgical History   Procedure Laterality Date   • Breast surgery           EVALUATION        PT Ortho       17 1040    Subjective Comments    Subjective Comments Pt states her leg feels pretty good today.  Reports she has no to little pain in the morning, only hurts after she has been up on her feet during the day.  -    Subjective Pain    Able to rate subjective pain? yes  -    Pre-Treatment Pain Level 0  -    Post-Treatment Pain Level 4  -    Transfers    Transfer, Comment Pt long sitting on stretcher for tx.  -      17 1040    Subjective Comments    Subjective Comments Pt states her legs are feeling better since using compressogrip.  She reports she has a follow-up appointment with her PCP next week, will try to obtain a script for compression stockings to take to [facility].  Otherwise, no new complaints.  States she is having periods where she is pain-free in the left leg.  -    Subjective Pain    Able to rate subjective pain? yes  -    Pre-Treatment Pain Level 2  -    Post-Treatment Pain Level 5  -     "Transfers    Transfer, Comment Pt long sitting on stretcher for tx.  -      User Key  (r) = Recorded By, (t) = Taken By, (c) = Cosigned By    Initials Name Provider Type    BOB Carnes, PT Physical Therapist                    LDA Wound       01/19/17 1040          Wound 01/04/17 0900 Left medial ankle ulceration, venous    Wound - Properties Group Date first assessed: 01/04/17  - Time first assessed: 0900  - Side: Left  - Orientation: medial  - Location: ankle  - Type: ulceration, venous  -    Wound WDL ex   periwound redness, slight swelling  -      Dressing Appearance dry;intact  -      Base moist;pink;reddened;yellow;slough;epithelialization   biofilm on surface, fibrous yellow tissue in base  -      Periwound Area redness;ecchymotic;swelling  -      Edges open;irregular  -      Drainage Characteristics/Odor serosanguineous;no odor  -      Drainage Amount small  -      Wound Cleaning cleansed with;other (see comments)   phase one wound   -      Wound Interventions debrided;honey  -      Dressing Dressing applied;foam;silver impregnated dressing   mepilex ag foam, 4\" optifoam gentle  -      Periwound Care cleansed with pH balanced cleanser;dry periwound area maintained  -        User Key  (r) = Recorded By, (t) = Taken By, (c) = Cosigned By    Initials Name Provider Type    NISHANT Howell, PT Physical Therapist    BOB Carnes, PT Physical Therapist            WOUND DEBRIDEMENT  Debridement Site 1  Location- Site 1: L medial ankle  Selective Debridement- Site 1: Wound Surface <20cmsq  Instruments- Site 1: tweezers, #15, scapel  Necrotic Tissue- Site 1: 90 % Pre  Excised Tissue Description- Site 1: moderate, slough  Residual Necrotic Tissue- Site 1: 40 % post  Bleeding- Site 1: seeping, held pressure, 1 minute             Recommendation and Plan        PT Assessment/Plan       01/19/17 1040 01/17/17 1040       PT Assessment    Functional Limitations " Performance in self-care ADL;Limitations in functional capacity and performance;Performance in leisure activities;Limitations in community activities  - Performance in self-care ADL;Limitations in functional capacity and performance;Performance in leisure activities;Limitations in community activities  -     Impairments Integumentary integrity;Pain  - Integumentary integrity;Pain  -     Assessment Comments Pt with new epithelial growth over previously clean pink areas in posterior wound area.  Still with thin biofilm buildup and fibrous tissue in base requiring ongoing debridement.  Pt tolerating compressogrip stockings well, and is awaiting a script from her PCP to obtain compression stockings.  - PT able to debride more slough today, pt still very painful but tolerant with rest breaks.  Posterior aspect is clean and pink with new epithelial growth, anterior aspect still deep with yellow fibrous tissue in base.  Pt making good progress with current tx.  Is to follow up with PCP next week and request script for compression stockings.  -BOB     Rehab Potential Good  -BOB Good  -BOB     Patient/caregiver participated in establishment of treatment plan and goals Yes  -BOB Yes  -BOB     Patient would benefit from skilled therapy intervention Yes  -JM Yes  -JM     PT Plan    PT Frequency 2x/week  -JM 2x/week  -     Planned CPT's? PT JASON DEBRIDE OPEN WOUND UP TO 20 CM: 65807  - PT JASON DEBRIDE OPEN WOUND UP TO 20 CM: [id number];PT MULTI LAYER COMP SYS LE;PT THER SUPP EA 15 MIN  -BOB     Physical Therapy Interventions (Optional Details) patient/family education;wound care  - patient/family education;wound care  -     PT Plan Comments Pt to change dressing over the weekend, may have to reschedule Tuesday due to family medical issues.  Pt will call to reschedule if needed.  -BOB Pt is caring for her mother and reports she may not be able to keep Tuesday's appt but will call to cancel if needed.  -BOB       User  Key  (r) = Recorded By, (t) = Taken By, (c) = Cosigned By    Initials Name Provider Type    BOB Carnes, PT Physical Therapist          Goals        PT OP Goals       01/19/17 1040 01/17/17 1040 01/12/17 1000    Time Calculation    PT Goal Re-Cert Due Date 02/03/17  -BOB 02/03/17  -JM 02/03/17  -      User Key  (r) = Recorded By, (t) = Taken By, (c) = Cosigned By    Initials Name Provider Type    BOB Carnes, PT Physical Therapist          PT Goal Re-Cert Due Date: 02/03/17            Time Calculation: Start Time: 1040    Therapy Charges for Today     Code Description Service Date Service Provider Modifiers Qty    32441536853  JASON DEBRIDE OPEN WOUND UP TO 20CM 1/19/2017 Analy Carnes, PT GP 1    [id number]  PT THER SUPP EA 15 MIN 1/19/2017 Analy Carnes, PT GP 1                Analy Carnes, PT  1/19/2017

## 2017-01-24 ENCOUNTER — APPOINTMENT (OUTPATIENT)
Dept: PHYSICAL THERAPY | Facility: HOSPITAL | Age: 67
End: 2017-01-24

## 2017-01-26 ENCOUNTER — HOSPITAL ENCOUNTER (OUTPATIENT)
Dept: PHYSICAL THERAPY | Facility: HOSPITAL | Age: 67
Setting detail: THERAPIES SERIES
Discharge: HOME | End: 2017-01-26

## 2017-01-26 ENCOUNTER — OFFICE VISIT (OUTPATIENT)
Dept: INTERNAL MEDICINE | Facility: CLINIC | Age: 67
End: 2017-01-26

## 2017-01-26 VITALS
HEIGHT: 69 IN | BODY MASS INDEX: 32.29 KG/M2 | DIASTOLIC BLOOD PRESSURE: 72 MMHG | WEIGHT: 218 LBS | SYSTOLIC BLOOD PRESSURE: 124 MMHG

## 2017-01-26 DIAGNOSIS — I10 ESSENTIAL HYPERTENSION: ICD-10-CM

## 2017-01-26 DIAGNOSIS — I83.023 VENOUS STASIS ULCER OF ANKLE, LEFT (HCC): Primary | ICD-10-CM

## 2017-01-26 DIAGNOSIS — L97.329 VENOUS STASIS ULCER OF ANKLE, LEFT (HCC): Primary | ICD-10-CM

## 2017-01-26 DIAGNOSIS — E78.5 HYPERLIPIDEMIA, UNSPECIFIED HYPERLIPIDEMIA TYPE: ICD-10-CM

## 2017-01-26 DIAGNOSIS — L97.929 VENOUS STASIS ULCER OF LEFT LOWER EXTREMITY (HCC): Primary | ICD-10-CM

## 2017-01-26 DIAGNOSIS — R73.01 IMPAIRED FASTING GLUCOSE: ICD-10-CM

## 2017-01-26 DIAGNOSIS — E04.2 NON-TOXIC MULTINODULAR GOITER: ICD-10-CM

## 2017-01-26 DIAGNOSIS — E55.9 VITAMIN D DEFICIENCY: ICD-10-CM

## 2017-01-26 DIAGNOSIS — R60.0 BILATERAL EDEMA OF LOWER EXTREMITY: ICD-10-CM

## 2017-01-26 DIAGNOSIS — I83.029 VENOUS STASIS ULCER OF LEFT LOWER EXTREMITY (HCC): Primary | ICD-10-CM

## 2017-01-26 PROCEDURE — 97597 DBRDMT OPN WND 1ST 20 CM/<: CPT

## 2017-01-26 PROCEDURE — 99214 OFFICE O/P EST MOD 30 MIN: CPT | Performed by: INTERNAL MEDICINE

## 2017-01-26 NOTE — MR AVS SNAPSHOT
Rosalie Amador   1/26/2017 10:45 AM   Office Visit    Dept Phone:  [phone]   Encounter #:  [id number]    Provider:  Lisset Godwin MD   Department:  [facility] INTERNAL MEDICINE AND ENDOCRINOLOGY [place]                Your Full Care Plan              Your Updated Medication List          This list is accurate as of: 1/26/17  1:40 PM.  Always use your most recent med list.                aspirin 81 MG tablet       CALCIUM 500 + D PO       CoQ-10 200 MG capsule       lisinopril 20 MG tablet   Commonly known as:  PRINIVIL,ZESTRIL       MULTIVITAMINS PO       Vitamin D3 1000 UNITS capsule               We Performed the Following     CBC & Differential     Comprehensive Metabolic Panel     Compression Stockings     Hemoglobin A1c     Lipid Panel     Microalbumin / Creatinine Urine Ratio     T4, Free     TSH     Urinalysis With Microscopic     Vitamin D 25 Hydroxy       You Were Diagnosed With        Codes Comments    Venous stasis ulcer of left lower extremity    -  Primary ICD-10-CM: I83.029  ICD-9-CM: 454.0     Bilateral edema of lower extremity     ICD-10-CM: R60.0  ICD-9-CM: 782.3     Essential hypertension     ICD-10-CM: I10  ICD-9-CM: 401.9     Hyperlipidemia, unspecified hyperlipidemia type     ICD-10-CM: E78.5  ICD-9-CM: 272.4     Impaired fasting glucose     ICD-10-CM: R73.01  ICD-9-CM: 790.21     Vitamin D deficiency     ICD-10-CM: E55.9  ICD-9-CM: 268.9     Non-toxic multinodular goiter     ICD-10-CM: E04.2  ICD-9-CM: 241.1       Instructions     None    Patient Instructions History      Upcoming Appointments     Visit Type Date Time Department    FOLLOW UP 1/26/2017 10:45 AM [facility]    TREATMENT 1/26/2017  2:00 PM  [facility]    SUBSEQUENT MEDICARE WELLNESS 1/31/2017 10:30 AM [facility]    OUTSIDE FACILITY 2/16/2017  9:30 AM [facility] [place]      MyChart Signup     Our records indicate that you have declined [facility] MyChart signup. If you would like to  "sign up for [employer], please email [email or web address] or call [phone] to obtain an activation code.             Other Info from Your Visit           Your Appointments     Jan 26, 2017  2:00 PM EST   Therapy Treatment with Analy Carnes PT   [facility] OUTPATIENT PHYSICAL THERAPY ([place])    [address] [id number] [phone]            Jan 31, 2017 10:30 AM EST   Subsequent Medicare Wellness with Lisset Godwin MD   [facility] INTERNAL MEDICINE AND ENDOCRINOLOGY [place] (--)    [address] [place] [address] [address]  [place] [id number] [phone]            Feb 16, 2017  9:30 AM EST   Outside Facility with Maciel Sanchez MD   [facility] MEDICAL GROUP GASTROENTEROLOGY (--)    [address] [place] [address] [address]  [place] [id number] [phone]              Allergies     Benzonatate      Morphine And Related        Reason for Visit     Follow-up Leg wound      Vital Signs     Blood Pressure Height Weight Body Mass Index Smoking Status       124/72 69\" (175.3 cm) 218 lb (98.9 kg) 32.19 kg/m2 Never Smoker       Problems and Diagnoses Noted     Edema of both legs    High cholesterol or triglycerides    High blood pressure    Increased fasting blood sugar    Non-toxic multinodular goiter    Venous stasis ulcer of left lower extremity    Vitamin D deficiency        "

## 2017-01-26 NOTE — PROGRESS NOTES
"Chief Complaint   Patient presents with   • Follow-up     Leg wound       History of Present Illness  66 y.o.  woman presents for follow-up re: left leg wound, attributed to venous stasis ulcer.  Has had significant improvement with decreased size as well as decreased pain of the wound with wound clinic.  Has been recommended to obtain compression stocking from [facility] to decr risk of future stasis ulcers.  Patient is pleased with progress.  No other complaints or concerns today.    Review of Systems  Denies fevers.  Drainage from wound is decreased.  Swelling in legs and around wound also decreased.  All other ROS reviewed and negative.    [facility]  The following portions of the patient's history were reviewed and updated as appropriate: allergies, current medications, past family history, past medical history, past social history, past surgical history and problem list.      Current Outpatient Prescriptions:   •  aspirin 81 MG tablet, Take  by mouth daily., Disp: , Rfl:   •  Calcium Carbonate-Vitamin D (CALCIUM 500 + D PO), Take  by mouth daily., Disp: , Rfl:   •  Cholecalciferol (VITAMIN D3) 1000 UNITS capsule, Take 1,000 Units by mouth daily., Disp: , Rfl:   •  Coenzyme Q10 (COQ-10) 200 MG capsule, Take  by mouth daily., Disp: , Rfl:   •  lisinopril (PRINIVIL,ZESTRIL) 20 MG tablet, Take 20 mg by mouth daily., Disp: , Rfl:   •  Multiple Vitamin (MULTIVITAMINS PO), Take  by mouth daily., Disp: , Rfl:     Visit Vitals   • /72   • Ht 69\" (175.3 cm)   • Wt 218 lb (98.9 kg)   • BMI 32.19 kg/m2       Physical Exam   Constitutional: She is oriented to person, place, and time. She appears well-developed and well-nourished.   Cardiovascular: Normal rate, regular rhythm and normal heart sounds.    BLE varicose and spider veins   Pulmonary/Chest: Effort normal and breath sounds normal.   Musculoskeletal: She exhibits edema (mild ankle edema, improved).   Neurological: She is alert and oriented to person, place, " and time.   Skin:   Left lower leg medially above medial malleolus with 2in diameter area of purple discoloration; central dime-sized shallow ulcer with yellow base, mild drainage   Psychiatric: She has a normal mood and affect. Her behavior is normal.   Nursing note and vitals reviewed.      LABS  Results for orders placed or performed in visit on 01/03/17   Wound Culture   Result Value Ref Range    Wound Culture Light growth (2+) Staphylococcus epidermidis (A)     Gram Stain Result Many (4+) WBCs seen     Gram Stain Result Few (2+) Gram positive cocci in pairs        Susceptibility    Staphylococcus epidermidis - MADELINE     Clindamycin <=0.5 Susceptible ug/ml     Daptomycin <=0.5 Susceptible ug/ml     Erythromycin >4 Resistant ug/ml     Gentamicin <=4 Susceptible ug/ml     Levofloxacin <=1 Susceptible ug/ml     Linezolid <=1 Susceptible ug/ml     Oxacillin >2 Resistant ug/ml     Penicillin G >8 Resistant ug/ml     Quinupristin + Dalfopristin <=0.5 Susceptible ug/ml     Rifampin <=1 Susceptible ug/ml     Tetracycline <=4 Susceptible ug/ml     Trimethoprim + Sulfamethoxazole <=0.5/9.5 Susceptible ug/ml     Vancomycin 2 Susceptible ug/ml   1/17 L. tibfib and ankle xrays nl except minimal degen changes at L. knee    ASSESSMENT/PLAN  Problem List Items Addressed This Visit     Impaired fasting glucose    Relevant Orders    Comprehensive Metabolic Panel    Hemoglobin A1c    Vitamin D deficiency    Relevant Orders    Vitamin D 25 Hydroxy    Non-toxic multinodular goiter    Relevant Orders    TSH    T4, Free    Hypertension     BP remains good         Relevant Orders    Urinalysis With Microscopic    Microalbumin / Creatinine Urine Ratio    Hyperlipidemia    Relevant Orders    Lipid Panel    Bilateral edema of lower extremity     BLE mild compression stockings RX given to patient with current open venous stasis ulcer LLE         Relevant Orders    Compression Stockings    Venous stasis ulcer of left lower extremity - Primary      Healing well, < 50% previous size with decreased swelling, cont PT plans via wound clinic         Relevant Orders    Compression Stockings    CBC & Differential          FOLLOW-UP  1. Health maintenance - flu vaccine 9/16  2. RTC for wellness 1/31/17 as scheduled; fasting labs the week prior to appt (CBC, CMP, TSH, lipids, UA/micro, A1C, vit D, FT4, microalb)      Electronically signed by:    Lisset Godwin MD  01/26/2017

## 2017-01-26 NOTE — PROGRESS NOTES
Outpatient Rehabilitation - Wound/Debridement Treatment Note  [facility]     Patient Name: Rosalie Amador  : 1950  MRN: [id number]  Today's Date: 2017                Admit Date: 2017    Visit Dx:    ICD-10-CM ICD-9-CM   1. Venous stasis ulcer of ankle, left I83.023 454.0       Patient Active Problem List   Diagnosis   • Impaired fasting glucose   • Vitamin D deficiency   • Scalp cyst   • Osteopenia   • Obesity   • Non-toxic multinodular goiter   • Uterine leiomyoma   • Hypertension   • Hyperlipidemia   • Hemorrhoids   • Carotid atherosclerosis   • Malignant neoplasm of right female breast   • Palpitations   • Ecchymosis   • Bilateral edema of lower extremity   • Venous stasis ulcer of left lower extremity        Past Medical History   Diagnosis Date   • Cancer    • Right wrist fracture 2016     ortho - Dr. Albin Kinney        Past Surgical History   Procedure Laterality Date   • Breast surgery           EVALUATION        PT Ortho       17 1400    Subjective Comments    Subjective Comments Pt was just seen at PCP office, was given a script for compression stockings to take to [facility].  States her leg is doing much better.  -    Subjective Pain    Able to rate subjective pain? yes  -    Pre-Treatment Pain Level 0  -    Post-Treatment Pain Level 3  -    Transfers    Transfer, Comment Pt long sitting on stretcher for tx.  -      User Key  (r) = Recorded By, (t) = Taken By, (c) = Cosigned By    Initials Name Provider Type    BOB Carnes, PT Physical Therapist                    LDA Wound       17 1400          Wound 17 0900 Left medial ankle ulceration, venous    Wound - Properties Group Date first assessed: 17  - Time first assessed: 900  - Side: Left  - Orientation: medial  - Location: ankle  - Type: ulceration, venous  -MC    Wound WDL ex   periwound redness, slight swelling  -      Dressing Appearance dry;intact  -      Base  "moist;pink;reddened;yellow;slough;epithelialization   biofilm on surface, fibrous yellow tissue in base  -      Periwound Area redness;ecchymotic;swelling  -      Edges open;irregular  -      Drainage Characteristics/Odor serosanguineous;no odor  -      Drainage Amount small  -      Wound Cleaning cleansed with;other (see comments)   Phase One wound   -      Wound Interventions debrided;honey  -      Dressing Dressing applied;low-adherent;silver impregnated dressing   mepilex ag foam, 4\" optifoam gentle border  -      Periwound Care cleansed with pH balanced cleanser;dry periwound area maintained  -        User Key  (r) = Recorded By, (t) = Taken By, (c) = Cosigned By    Initials Name Provider Type    NISHANT Howell, PT Physical Therapist    BOB Carnes, PT Physical Therapist              Lymphedema       01/26/17 1400          Lymphedema Edema Assessment    Ptting Edema Category By severity  -      Pitting Edema Mild  -      Skin Changes/Observations    Lower Extremity Color/Pigment left:;purple;erythema  -      Lymphedema Pulses/Capillary Refill    Lower Extremity Capillary Refill left:;less than 3 seconds  -      Compression/Skin Care    Wrapping Location LE left:;foot to knee  -      Bandage Layers cotton elastic stocking- double layer (comment size)   size 5 on calf, size 4 on foot/ankle  -        User Key  (r) = Recorded By, (t) = Taken By, (c) = Cosigned By    Initials Name Provider Type    BOB Carnes, PT Physical Therapist          WOUND DEBRIDEMENT  Debridement Site 1  Location- Site 1: L medial ankle  Selective Debridement- Site 1: Wound Surface <20cmsq  Instruments- Site 1: tweezers  Necrotic Tissue- Site 1: 90 % Pre  Excised Tissue Description- Site 1: moderate, slough  Residual Necrotic Tissue- Site 1: 20 % post  Bleeding- Site 1: none             Recommendation and Plan        PT Assessment/Plan       01/26/17 1400          PT Assessment    " Functional Limitations Performance in self-care ADL;Limitations in functional capacity and performance;Performance in leisure activities;Limitations in community activities  -      Impairments Integumentary integrity;Pain  -      Assessment Comments Medial L ankle ulcer re-epithelializing, with clean beefy granulation in 80% of wound base after debridement of slough/biofilm today.  Pt would benefit from follow-up in one week for PRN debridement and dressing management until wound closure and patient able to tolerate compression stockings.  -      Rehab Potential Good  -      Patient/caregiver participated in establishment of treatment plan and goals Yes  -      Patient would benefit from skilled therapy intervention Yes  -      PT Plan    PT Frequency 1x/week  -      Physical Therapy Interventions (Optional Details) patient/family education;wound care  -        User Key  (r) = Recorded By, (t) = Taken By, (c) = Cosigned By    Initials Name Provider Type    BOB Carnes, PT Physical Therapist          Goals        PT OP Goals       01/26/17 1400 01/19/17 1040 01/17/17 1040    Time Calculation    PT Goal Re-Cert Due Date 02/03/17  - 02/03/17  - 02/03/17  -      User Key  (r) = Recorded By, (t) = Taken By, (c) = Cosigned By    Initials Name Provider Type    BOB Carnes, PT Physical Therapist          PT Goal Re-Cert Due Date: 02/03/17            Time Calculation: Start Time: 1400    Therapy Charges for Today     Code Description Service Date Service Provider Modifiers Qty    54094244913 HC JASON DEBRIDE OPEN WOUND UP TO 20CM 1/26/2017 Analy Carnes, PT GP 1    [id number]  PT THER SUPP EA 15 MIN 1/26/2017 Analy Carnes, PT GP 1                Analy Carnes, PT  1/26/2017

## 2017-01-26 NOTE — MR AVS SNAPSHOT
Rosalie Amador   1/26/2017  2:00 PM   Therapy Treatment    Dept Phone:  [phone]   Encounter #:  [id number]    Provider:  Analy Carnes PT   Department:  [facility] OUTPATIENT PHYSICAL THERAPY                Your Full Care Plan              Your Updated Medication List      ASK your doctor about these medications     aspirin 81 MG tablet       CALCIUM 500 + D PO       CoQ-10 200 MG capsule       lisinopril 20 MG tablet   Commonly known as:  PRINIVIL,ZESTRIL       MULTIVITAMINS PO       Vitamin D3 1000 UNITS capsule               Instructions     None    Patient Instructions History      Upcoming Appointments     Visit Type Date Time Department    FOLLOW UP 1/26/2017 10:45 AM [facility]    TREATMENT 1/26/2017  2:00 PM [facility]    SUBSEQUENT MEDICARE WELLNESS 1/31/2017 10:30 AM [facility]    TREATMENT 2/1/2017  9:30 AM [facility]    OUTSIDE FACILITY 2/16/2017  9:30 AM [facility] [place]      MyChart Signup     Our records indicate that you have declined [facility] MyChart signup. If you would like to sign up for MyChart, please email [email or web address] or call [phone] to obtain an activation code.             Other Info from Your Visit           Your Appointments     Jan 31, 2017 10:30 AM EST   Subsequent Medicare Wellness with Lisset Godwin MD   [facility] INTERNAL MEDICINE AND ENDOCRINOLOGY [place] (--)    [address]  [facility] [address] [phone]            Feb 01, 2017  9:30 AM EST   Therapy Treatment with Doreen Howell, PT   [facility] OUTPATIENT PHYSICAL THERAPY ([place])    [address] [place] [address] [phone]            Feb 16, 2017  9:30 AM EST   Outside Facility with Maciel Sanchez MD   [facility] MEDICAL GROUP GASTROENTEROLOGY (--)    [address] [place] [address]. [address] [id number] [phone]              Allergies     Benzonatate      Morphine And Related        Vital Signs     Smoking Status                   Never Smoker

## 2017-01-27 LAB
25(OH)D3+25(OH)D2 SERPL-MCNC: 29.4 NG/ML
ALBUMIN SERPL-MCNC: 4.1 G/DL (ref 3.2–4.8)
ALBUMIN/CREAT UR: <27.8 MG/G CREAT (ref 0–30)
ALBUMIN/GLOB SERPL: 1.5 G/DL (ref 1.5–2.5)
ALP SERPL-CCNC: 51 U/L (ref 25–100)
ALT SERPL-CCNC: 28 U/L (ref 7–40)
APPEARANCE UR: CLEAR
AST SERPL-CCNC: 28 U/L (ref 0–33)
BACTERIA #/AREA URNS HPF: NORMAL /HPF
BASOPHILS # BLD AUTO: 0.02 10*3/MM3 (ref 0–0.2)
BASOPHILS NFR BLD AUTO: 0.3 % (ref 0–1)
BILIRUB SERPL-MCNC: 0.6 MG/DL (ref 0.3–1.2)
BILIRUB UR QL STRIP: NEGATIVE
BUN SERPL-MCNC: 13 MG/DL (ref 9–23)
BUN/CREAT SERPL: 21.7 (ref 7–25)
CALCIUM SERPL-MCNC: 9.5 MG/DL (ref 8.7–10.4)
CASTS URNS MICRO: NORMAL
CHLORIDE SERPL-SCNC: 100 MMOL/L (ref 99–109)
CHOLEST SERPL-MCNC: 210 MG/DL (ref 0–200)
CO2 SERPL-SCNC: 26 MMOL/L (ref 20–31)
COLOR UR: YELLOW
CREAT SERPL-MCNC: 0.6 MG/DL (ref 0.6–1.3)
CREAT UR-MCNC: 10.8 MG/DL
EOSINOPHIL # BLD AUTO: 0.1 10*3/MM3 (ref 0.1–0.3)
EOSINOPHIL NFR BLD AUTO: 1.6 % (ref 0–3)
EPI CELLS #/AREA URNS HPF: NORMAL /HPF
ERYTHROCYTE [DISTWIDTH] IN BLOOD BY AUTOMATED COUNT: 13.4 % (ref 11.3–14.5)
GLOBULIN SER CALC-MCNC: 2.8 GM/DL
GLUCOSE SERPL-MCNC: 75 MG/DL (ref 70–100)
GLUCOSE UR QL: NEGATIVE
HBA1C MFR BLD: 5.6 % (ref 4.8–5.6)
HCT VFR BLD AUTO: 41.4 % (ref 34.5–44)
HDLC SERPL-MCNC: 50 MG/DL (ref 40–60)
HGB BLD-MCNC: 13.7 G/DL (ref 11.5–15.5)
HGB UR QL STRIP: NEGATIVE
IMM GRANULOCYTES # BLD: 0.01 10*3/MM3 (ref 0–0.03)
IMM GRANULOCYTES NFR BLD: 0.2 % (ref 0–0.6)
KETONES UR QL STRIP: NEGATIVE
LDLC SERPL CALC-MCNC: 135 MG/DL (ref 0–100)
LEUKOCYTE ESTERASE UR QL STRIP: NEGATIVE
LYMPHOCYTES # BLD AUTO: 2.25 10*3/MM3 (ref 0.6–4.8)
LYMPHOCYTES NFR BLD AUTO: 35.2 % (ref 24–44)
MCH RBC QN AUTO: 30.4 PG (ref 27–31)
MCHC RBC AUTO-ENTMCNC: 33.1 G/DL (ref 32–36)
MCV RBC AUTO: 92 FL (ref 80–99)
MICROALBUMIN UR-MCNC: <3 UG/ML
MONOCYTES # BLD AUTO: 0.32 10*3/MM3 (ref 0–1)
MONOCYTES NFR BLD AUTO: 5 % (ref 0–12)
NEUTROPHILS # BLD AUTO: 3.7 10*3/MM3 (ref 1.5–8.3)
NEUTROPHILS NFR BLD AUTO: 57.7 % (ref 41–71)
NITRITE UR QL STRIP: NEGATIVE
PH UR STRIP: 6 [PH] (ref 5–8)
PLATELET # BLD AUTO: 198 10*3/MM3 (ref 150–450)
POTASSIUM SERPL-SCNC: 4.2 MMOL/L (ref 3.5–5.5)
PROT SERPL-MCNC: 6.9 G/DL (ref 5.7–8.2)
PROT UR QL STRIP: NEGATIVE
RBC # BLD AUTO: 4.5 10*6/MM3 (ref 3.89–5.14)
RBC #/AREA URNS HPF: NORMAL /HPF
SODIUM SERPL-SCNC: 136 MMOL/L (ref 132–146)
SP GR UR: (no result) (ref 1–1.03)
T4 FREE SERPL-MCNC: 1.19 NG/DL (ref 0.89–1.76)
TRIGL SERPL-MCNC: 127 MG/DL (ref 0–150)
TSH SERPL DL<=0.005 MIU/L-ACNC: 2.01 MIU/ML (ref 0.35–5.35)
UROBILINOGEN UR STRIP-MCNC: (no result) MG/DL
VLDLC SERPL CALC-MCNC: 25.4 MG/DL
WBC # BLD AUTO: 6.4 10*3/MM3 (ref 3.5–10.8)
WBC #/AREA URNS HPF: NORMAL /HPF

## 2017-01-31 ENCOUNTER — OFFICE VISIT (OUTPATIENT)
Dept: INTERNAL MEDICINE | Facility: CLINIC | Age: 67
End: 2017-01-31

## 2017-01-31 VITALS
SYSTOLIC BLOOD PRESSURE: 126 MMHG | HEIGHT: 69 IN | BODY MASS INDEX: 32.14 KG/M2 | DIASTOLIC BLOOD PRESSURE: 84 MMHG | WEIGHT: 217 LBS

## 2017-01-31 DIAGNOSIS — M85.80 OSTEOPENIA: ICD-10-CM

## 2017-01-31 DIAGNOSIS — I65.23 CAROTID ATHEROSCLEROSIS, BILATERAL: ICD-10-CM

## 2017-01-31 DIAGNOSIS — Z00.00 MEDICARE ANNUAL WELLNESS VISIT, SUBSEQUENT: Primary | ICD-10-CM

## 2017-01-31 DIAGNOSIS — R73.01 IMPAIRED FASTING GLUCOSE: ICD-10-CM

## 2017-01-31 DIAGNOSIS — E04.2 NON-TOXIC MULTINODULAR GOITER: ICD-10-CM

## 2017-01-31 DIAGNOSIS — I83.029 VENOUS STASIS ULCER OF LEFT LOWER EXTREMITY (HCC): ICD-10-CM

## 2017-01-31 DIAGNOSIS — E78.2 MIXED HYPERLIPIDEMIA: ICD-10-CM

## 2017-01-31 DIAGNOSIS — E55.9 VITAMIN D DEFICIENCY: ICD-10-CM

## 2017-01-31 DIAGNOSIS — I10 ESSENTIAL HYPERTENSION: ICD-10-CM

## 2017-01-31 DIAGNOSIS — L97.929 VENOUS STASIS ULCER OF LEFT LOWER EXTREMITY (HCC): ICD-10-CM

## 2017-01-31 PROCEDURE — 96160 PT-FOCUSED HLTH RISK ASSMT: CPT | Performed by: INTERNAL MEDICINE

## 2017-01-31 PROCEDURE — 99397 PER PM REEVAL EST PAT 65+ YR: CPT | Performed by: INTERNAL MEDICINE

## 2017-01-31 PROCEDURE — 93000 ELECTROCARDIOGRAM COMPLETE: CPT | Performed by: INTERNAL MEDICINE

## 2017-01-31 PROCEDURE — G0444 DEPRESSION SCREEN ANNUAL: HCPCS | Performed by: INTERNAL MEDICINE

## 2017-01-31 PROCEDURE — G0439 PPPS, SUBSEQ VISIT: HCPCS | Performed by: INTERNAL MEDICINE

## 2017-01-31 RX ORDER — LISINOPRIL 20 MG/1
20 TABLET ORAL DAILY
Qty: 90 TABLET | Refills: 3 | Status: SHIPPED | OUTPATIENT
Start: 2017-01-31 | End: 2018-03-30 | Stop reason: SDUPTHER

## 2017-01-31 NOTE — PROGRESS NOTES
ANNUAL WELLNESS VISIT    DRUG AND ALCOHOL USE      no alcohol use, no tobacco use and caffeine intake: 2 cups of caffeinated coffee per day    DIET AND PHYSICAL ACTIVITY     Diet: general    Exercise: infrequently   Exercise Details: walking    MOOD DISORDER AND COGNITIVE SCREENING   Depression Screening Tool Used yes - see PHQ-9   Anxiety Screening Tool Used yes     Mini-Cog Performed   Yes    1. Tell Patient 3 Words table,car,book    2. Administer Clock Test Abnormal     3. Recall 3 words  table,car,book    4. Number Correct Items 3    FUNCTIONAL ABILITY AND LEVEL OF SAFETY   Hearing no hearing loss     Wears Hearing Aids No       Current Activities Independent      none  - see Funct/Cog Status Intake     Fall Risk Assessment       Has difficulty with walking or balance  No         Timed Up and Go (TUG) Test  5 sec.       If >12 sec, normal    ADVANCED DIRECTIVE has NO advanced directive - information provided to the patient today    PAIN SCREENING Do you have pain right now? no         Recent Hospitalizations:  No recent hospitalization(s)..     MEDICATION REVIEW   - updated and reviewed (see Medication List).   - reviewed for potentially harmful drug-disease interactions in the elderly.   - reviewed for high risk medications in the elderly.   - aspirin use: Yes; talks 81mg QD ASA  _________________________________________________  Chief Complaint   Patient presents with   • [employer] Wellness       History of Present Illness  66 y.o.  woman presents for updated phys examination and wellness visit.    Review of Systems  Denies headaches, visual changes, CP, palpitations, SOB, cough, abd pain, n/v/d, difficulty with urination, numbness/tingling, falls, mood changes, lightheadedness, hearing changes, rashes.     All other ROS reviewed and negative.    [facility]  The following portions of the patient's history were reviewed and updated as appropriate: allergies, current medications, past family history,  "past medical history, past social history, past surgical history and problem list.      Current Outpatient Prescriptions:   •  aspirin 81 MG tablet, Take  by mouth daily., Disp: , Rfl:   •  Calcium Carbonate-Vitamin D (CALCIUM 500 + D PO), Take  by mouth daily., Disp: , Rfl:   •  Cholecalciferol (VITAMIN D3) 1000 UNITS capsule, Take 1,000 Units by mouth daily., Disp: , Rfl:   •  Coenzyme Q10 (COQ-10) 200 MG capsule, Take  by mouth daily., Disp: , Rfl:   •  lisinopril (PRINIVIL,ZESTRIL) 20 MG tablet, Take 1 tablet by mouth Daily., Disp: 90 tablet, Rfl: 3  •  Multiple Vitamin (MULTIVITAMINS PO), Take  by mouth daily., Disp: , Rfl:     Visit Vitals   • /84   • Ht 69\" (175.3 cm)   • Wt 217 lb (98.4 kg)   • BMI 32.05 kg/m2       Physical Exam   Constitutional: She is oriented to person, place, and time. She appears well-developed and well-nourished.   HENT:   Head: Normocephalic.   Right Ear: Tympanic membrane normal. No decreased hearing (finger rubbing intact) is noted.   Left Ear: Tympanic membrane normal. No decreased hearing (finger rubbing intact) is noted.   Nose: Nose normal.   Mouth/Throat: Oropharynx is clear and moist and mucous membranes are normal. No oropharyngeal exudate.   bilat aud canals near occluded with cerumen today   Eyes: Conjunctivae and EOM are normal. Pupils are equal, round, and reactive to light.   Neck: Normal range of motion. Neck supple. Carotid bruit is not present. Thyromegaly (mild, nontender) present.   Cardiovascular: Normal rate, regular rhythm and normal heart sounds.    Pulmonary/Chest: Effort normal and breath sounds normal. No respiratory distress.   Abdominal: Soft. Bowel sounds are normal. She exhibits no distension and no mass. There is no hepatosplenomegaly. There is no tenderness.   Genitourinary:   Genitourinary Comments: Breast/pelvic exams deferred to GYN       Musculoskeletal: Normal range of motion. She exhibits no edema (mild ankle edema bilaterallyspider veins " but no ankle edema bilaterally today).   Lymphadenopathy:     She has no cervical adenopathy.   Neurological: She is alert and oriented to person, place, and time. She has normal strength and normal reflexes. She displays normal reflexes. No cranial nerve deficit or sensory deficit.   Skin: Skin is warm and dry. No rash noted.   Left ant shin with cont'd healing ulcer, now even smaller at < 1cm with decreased surrounding hyperpigmentation; no associated edema at the ulcer today, nontender   Psychiatric: She has a normal mood and affect. Her behavior is normal.   Nursing note and vitals reviewed.      LABS  Results for orders placed or performed in visit on 01/26/17   Comprehensive Metabolic Panel   Result Value Ref Range    Glucose 75 70 - 100 mg/dL    BUN 13 9 - 23 mg/dL    Creatinine 0.60 0.60 - 1.30 mg/dL    eGFR Non African Am 100 >60 mL/min/1.73    eGFR African Am 121 >60 mL/min/1.73    BUN/Creatinine Ratio 21.7 7.0 - 25.0    Sodium 136 132 - 146 mmol/L    Potassium 4.2 3.5 - 5.5 mmol/L    Chloride 100 99 - 109 mmol/L    Total CO2 26.0 20.0 - 31.0 mmol/L    Calcium 9.5 8.7 - 10.4 mg/dL    Total Protein 6.9 5.7 - 8.2 g/dL    Albumin 4.10 3.20 - 4.80 g/dL    Globulin 2.8 gm/dL    A/G Ratio 1.5 1.5 - 2.5 g/dL    Total Bilirubin 0.6 0.3 - 1.2 mg/dL    Alkaline Phosphatase 51 25 - 100 U/L    AST (SGOT) 28 0 - 33 U/L    ALT (SGPT) 28 7 - 40 U/L   Lipid Panel   Result Value Ref Range    Total Cholesterol 210 (H) 0 - 200 mg/dL    Triglycerides 127 0 - 150 mg/dL    HDL Cholesterol 50 40 - 60 mg/dL    VLDL Cholesterol 25.4 mg/dL    LDL Cholesterol  135 (H) 0 - 100 mg/dL   TSH   Result Value Ref Range    TSH 2.009 0.350 - 5.350 mIU/mL   Hemoglobin A1c   Result Value Ref Range    Hemoglobin A1C 5.60 4.80 - 5.60 %   Vitamin D 25 Hydroxy   Result Value Ref Range    25 Hydroxy, Vitamin D 29.4 ng/mL   T4, Free   Result Value Ref Range    Free T4 1.19 0.89 - 1.76 ng/dL   Microalbumin / Creatinine Urine Ratio   Result Value Ref  Range    Creatinine, Urine 10.8 Not Estab. mg/dL    Microalbumin, Urine <3.0 Not Estab. ug/mL    Microalbumin/Creatinine Ratio Urine <27.8 0.0 - 30.0 mg/g creat   Microscopic Examination   Result Value Ref Range    WBC, UA Comment None Seen /hpf    RBC, UA Comment /hpf    Epithelial Cells (non renal) Comment /hpf    Cast Type Comment     Bacteria, UA Comment /hpf   CBC & Differential   Result Value Ref Range    WBC 6.40 3.50 - 10.80 10*3/mm3    RBC 4.50 3.89 - 5.14 10*6/mm3    Hemoglobin 13.7 11.5 - 15.5 g/dL    Hematocrit 41.4 34.5 - 44.0 %    MCV 92.0 80.0 - 99.0 fL    MCH 30.4 27.0 - 31.0 pg    MCHC 33.1 32.0 - 36.0 g/dL    RDW 13.4 11.3 - 14.5 %    Platelets 198 150 - 450 10*3/mm3    Neutrophil Rel % 57.7 41.0 - 71.0 %    Lymphocyte Rel % 35.2 24.0 - 44.0 %    Monocyte Rel % 5.0 0.0 - 12.0 %    Eosinophil Rel % 1.6 0.0 - 3.0 %    Basophil Rel % 0.3 0.0 - 1.0 %    Neutrophils Absolute 3.70 1.50 - 8.30 10*3/mm3    Lymphocytes Absolute 2.25 0.60 - 4.80 10*3/mm3    Monocytes Absolute 0.32 0.00 - 1.00 10*3/mm3    Eosinophils Absolute 0.10 0.10 - 0.30 10*3/mm3    Basophils Absolute 0.02 0.00 - 0.20 10*3/mm3    Immature Granulocyte Rel % 0.2 0.0 - 0.6 %    Immature Grans Absolute 0.01 0.00 - 0.03 10*3/mm3   Urinalysis With Microscopic   Result Value Ref Range    Specific Gravity, UA Comment 1.005 - 1.030    pH, UA 6.0 5.0 - 8.0    Color, UA Yellow     Appearance, UA Clear Clear    Leukocytes, UA Negative Negative    Protein Negative Negative    Glucose, UA Negative Negative    Ketones Negative Negative    Blood, UA Negative Negative    Bilirubin, UA Negative Negative    Urobilinogen, UA Comment     Nitrite, UA Negative Negative       ECG 12 Lead  Date/Time: 1/31/2017 11:05 AM  Performed by: MALIK BRASWELL  Authorized by: MALIK BRASWELL   Comparison: compared with previous ECG from 1/28/2016  Similar to previous ECG  Rhythm: sinus rhythm  Rate: normal  BPM: 64  Conduction: conduction normal  ST Segments: ST segments  normal  T Waves: T waves normal  QRS axis: normal  Other: no other findings  Clinical impression: normal ECG          ASSESSMENT/PLAN  Problem List Items Addressed This Visit     Impaired fasting glucose     BG control stable with A1C 5.6; encouraged reg phys activity to decr insulin resistance, moderation in unhealthy starches/sweets; f/u A1C in 6 mos           Vitamin D deficiency     stable with current supplementation 1000 units QD         Osteopenia     Calcium and vitamin D supplementation with weight-bearing exercise; DEXA 3/16, repeat 2019            Non-toxic multinodular goiter     Euthyroid; plan to f/u thyr u/s for surveillance 2018         Hypertension     BP and electrolytes stable on lisin 20mg QD #90, 3RF; low Na diet         Relevant Medications    lisinopril (PRINIVIL,ZESTRIL) 20 MG tablet    Other Relevant Orders    ECG 12 Lead    Hyperlipidemia     Lipids stable with ideal goal LDL < 100; no meds         Carotid atherosclerosis     Stable carotid u/s 2/16; cont ASA and f/u u/s in 2 years         Venous stasis ulcer of left lower extremity     Continues to heal well; finish PT as planned; cont compression stockings and leg elevation; encouraged reg exercise as well         Medicare annual wellness visit, subsequent - Primary     Health maintenance - flu vacc 9/16; Prevnar 1/15, PVX 1/16, Tdap and Zostavax 6/09; mammo 12/27/16 [facility]; GYN care with Dr. Sweeney 7/16; DEXA 3/16, repeat 2019; colonosc pending 2/16/17 with Dr. Sanchez; eye exam with Dr. Cooper 5/16; dental visit with Dr. Laura, done every 6mos; thyr and carotid u/s 2/16, repeat 2018 for cont'd surveillance of MNG and carotid atherosclerosis, respectively    Consultants:  Patient Care Team:  Lisset Godwin MD as PCP - General  Lisset Godwin MD as PCP - Internal Medicine  Albert Cooper MD as Consulting Physician (Ophthalmology)  Keely Sweeney MD as Consulting Physician (Obstetrics and Gynecology)  Maciel Sanchez MD as Consulting  Physician (Gastroenterology)  Albin Kinney Jr., MD as Consulting Physician (Hand Surgery)                   FOLLOW-UP  RTC 6 mos with A1C    Electronically signed by:    Lisset Godwin MD  01/31/2017

## 2017-01-31 NOTE — ASSESSMENT & PLAN NOTE
Health maintenance - flu vacc 9/16; Prevnar 1/15, PVX 1/16, Tdap and Zostavax 6/09; mammo 12/27/16 [facility]; GYN care with Dr. Sweeney 7/16; DEXA 3/16, repeat 2019; colonosc pending 2/16/17 with Dr. Sanchez; eye exam with Dr. Cooper 5/16; dental visit with Dr. Laura, done every 6mos; thyr and carotid u/s 2/16, repeat 2018 for cont'd surveillance of MNG and carotid atherosclerosis, respectively    Consultants:  Patient Care Team:  Lisset Godwin MD as PCP - General  Lisset Godwin MD as PCP - Internal Medicine  Albert Cooper MD as Consulting Physician (Ophthalmology)  Keely Sweeney MD as Consulting Physician (Obstetrics and Gynecology)  Maciel Sanchez MD as Consulting Physician (Gastroenterology)  Albin Kinney Jr., MD as Consulting Physician (Hand Surgery)

## 2017-01-31 NOTE — MR AVS SNAPSHOT
Rosalie ZAVALA Amador   1/31/2017 10:30 AM   Office Visit    Dept Phone:  [phone]   Encounter #:  [id number]    Provider:  Lisset Godwin MD   Department:  [facility] INTERNAL MEDICINE AND ENDOCRINOLOGY [place]                Your Full Care Plan              Where to Get Your Medications      These medications were sent to [address] [facility] - [phone]  - [phone]   [address] [id number]     Phone:  [phone]     lisinopril 20 MG tablet            Your Updated Medication List          This list is accurate as of: 1/31/17 11:37 AM.  Always use your most recent med list.                aspirin 81 MG tablet       CALCIUM 500 + D PO       CoQ-10 200 MG capsule       lisinopril 20 MG tablet   Commonly known as:  PRINIVIL,ZESTRIL   Take 1 tablet by mouth Daily.       MULTIVITAMINS PO       Vitamin D3 1000 UNITS capsule               We Performed the Following     ECG 12 Lead       You Were Diagnosed With        Codes Comments    Medicare annual wellness visit, subsequent    -  Primary ICD-10-CM: Z00.00  ICD-9-CM: V70.0     Essential hypertension     ICD-10-CM: I10  ICD-9-CM: 401.9     Mixed hyperlipidemia     ICD-10-CM: E78.2  ICD-9-CM: 272.2     Impaired fasting glucose     ICD-10-CM: R73.01  ICD-9-CM: 790.21     Vitamin D deficiency     ICD-10-CM: E55.9  ICD-9-CM: 268.9     Osteopenia     ICD-10-CM: M85.80  ICD-9-CM: 733.90     Carotid atherosclerosis, bilateral     ICD-10-CM: I65.23  ICD-9-CM: 433.10, 433.30     Non-toxic multinodular goiter     ICD-10-CM: E04.2  ICD-9-CM: 241.1     Venous stasis ulcer of left lower extremity     ICD-10-CM: I83.029  ICD-9-CM: 454.0       Instructions     None    Patient Instructions History      Upcoming Appointments     Visit Type Date Time Department    SUBSEQUENT MEDICARE WELLNESS 1/31/2017 10:30 AM [facility]    TREATMENT 2/1/2017  9:30 AM  " [facility]    OUTSIDE FACILITY 2/16/2017  9:30 AM [facility] [place]    FOLLOW UP 8/1/2017  8:15 AM [facility]      [place] Signup     Our records indicate that you have declined [facility]. If you would like to sign up for MyChart, please email [email or web address] or call [phone] to obtain an activation code.             Other Info from Your Visit           Your Appointments     Feb 01, 2017  9:30 AM EST   Therapy Treatment with Doreen Howell, PT   [facility] OUTPATIENT PHYSICAL THERAPY ([place])    [address] [facility] [address] [phone]            Feb 16, 2017  9:30 AM EST   Outside Facility with Maciel Sanchez MD   [facility] (--)    [address] [place] [address] [address]  [facility], [facility] [address]   [phone]            Aug 01, 2017  8:15 AM EDT   Follow Up with Lisset Godwin MD   [facility] (--)    [address]  [facility], [facility] [address]   [phone]           Arrive 15 minutes prior to appointment.            Feb 06, 2018  9:30 AM EST   Subsequent Medicare Wellness with Lisset Godwin MD   [facility] (--)    [address]  [facility] [address]   [phone]              Allergies     Benzonatate      Morphine And Related        Reason for Visit     [employer] Wellness           Vital Signs     Blood Pressure Height Weight Body Mass Index Smoking Status       126/84 69\" (175.3 cm) 217 lb (98.4 kg) 32.05 kg/m2 Never Smoker       Problems and Diagnoses Noted     Hardening of the arteries    High cholesterol or triglycerides    High blood pressure    Increased fasting blood sugar    Medicare annual wellness visit, subsequent    Non-toxic multinodular goiter    Bone disease    Venous stasis ulcer of left lower extremity    Vitamin D deficiency      Results     ECG 12 Lead        "

## 2017-01-31 NOTE — ASSESSMENT & PLAN NOTE
Continues to heal well; finish PT as planned; cont compression stockings and leg elevation; encouraged reg exercise as well

## 2017-01-31 NOTE — ASSESSMENT & PLAN NOTE
BG control stable with A1C 5.6; encouraged reg phys activity to decr insulin resistance, moderation in unhealthy starches/sweets; f/u A1C in 6 mos

## 2017-02-01 ENCOUNTER — HOSPITAL ENCOUNTER (OUTPATIENT)
Dept: PHYSICAL THERAPY | Facility: HOSPITAL | Age: 67
Setting detail: THERAPIES SERIES
Discharge: HOME | End: 2017-02-01

## 2017-02-01 DIAGNOSIS — R60.0 BILATERAL LOWER EXTREMITY EDEMA: ICD-10-CM

## 2017-02-01 DIAGNOSIS — I83.023 VENOUS STASIS ULCER OF ANKLE, LEFT (HCC): Primary | ICD-10-CM

## 2017-02-01 DIAGNOSIS — L97.329 VENOUS STASIS ULCER OF ANKLE, LEFT (HCC): Primary | ICD-10-CM

## 2017-02-01 PROCEDURE — G8991 OTHER PT/OT GOAL STATUS: HCPCS

## 2017-02-01 PROCEDURE — 29581 APPL MULTLAYER CMPRN SYS LEG: CPT

## 2017-02-01 PROCEDURE — 97597 DBRDMT OPN WND 1ST 20 CM/<: CPT

## 2017-02-01 PROCEDURE — G8990 OTHER PT/OT CURRENT STATUS: HCPCS

## 2017-02-01 NOTE — PROGRESS NOTES
Outpatient Rehabilitation - Wound/Debridement Progress Note  [facility]     Patient Name: Rosalie Amador  : 1950  MRN: [id number]  Today's Date: 2017                Admit Date: 2017    Visit Dx:    ICD-10-CM ICD-9-CM   1. Venous stasis ulcer of ankle, left I83.023 454.0   2. Bilateral lower extremity edema R60.0 782.3       Patient Active Problem List   Diagnosis   • Impaired fasting glucose   • Vitamin D deficiency   • Scalp cyst   • Osteopenia   • Obesity   • Non-toxic multinodular goiter   • Uterine leiomyoma   • Hypertension   • Hyperlipidemia   • Hemorrhoids   • Carotid atherosclerosis   • Malignant neoplasm of right female breast   • Palpitations   • Ecchymosis   • Bilateral edema of lower extremity   • Venous stasis ulcer of left lower extremity   • Medicare annual wellness visit, subsequent        Past Medical History   Diagnosis Date   • Hx of bone density study 2016     DEXA (3/16) L -1.4, H -1.7, repeat 3 yrs   • Hx of bone density study 2013     DEXA () L -1.7, H -0.6 per Dr. Sweeney   • Hx of colonoscopy 2010     colonosc (9/9/10): he,, suboptimal prep, repeat 5 yrs; GI - Dr. Sanchez   • Hx of Doppler ultrasound 2016     carotid u/s (16): no carotid stenoses bilaterally; intimal thickening   • Right wrist fracture 2016     ortho - Dr. Albin Kinney        Past Surgical History   Procedure Laterality Date   • Lipoma excision Left      s/p excision lipoma L. ventral forearm   • Modified radical mastectomy w/ axillary lymph node dissection Right      secondary to breast CA (')   • Tonsillectomy     • Tubal abdominal ligation           EVALUATION        PT Ortho       17 0920    Subjective Comments    Subjective Comments No complaints since last time. Pt reports the pain is much less than before. She got her compression stockings from [employer], but is planning to wait to apply them until she leaves to go out of town for several days.  -     "Subjective Pain    Able to rate subjective pain? yes  -MC    Pre-Treatment Pain Level 0  -MC    Post-Treatment Pain Level 2  -MC    Transfers    Transfer, Comment Pt long sitting on stretcher for tx  -MC      User Key  (r) = Recorded By, (t) = Taken By, (c) = Cosigned By    Initials Name Provider Type    NISHANT Howell, PT Physical Therapist                    LDA Wound       02/01/17 0920          Wound 01/04/17 0900 Left medial ankle ulceration, venous    Wound - Properties Group Date first assessed: 01/04/17  - Time first assessed: 0900  - Side: Left  - Orientation: medial  - Location: ankle  - Type: ulceration, venous  -MC    Wound WDL ex   slight swelling  -MC      Dressing Appearance dry;intact  -MC      Base moist;pink;reddened;yellow;slough;epithelialization   min yellow slough in depth of wound after debridement  -MC      Periwound Area ecchymotic;swelling  -MC      Edges open;irregular  -MC      Length (cm) 0.2  -MC      Width (cm) 0.4  -MC      Depth (cm) 0.2  -MC      Drainage Characteristics/Odor serosanguineous;no odor  -MC      Drainage Amount small  -MC      Picture taken yes  -MC      Wound Cleaning cleansed with;other (see comments)   phase one wound cleanser  -      Wound Interventions debrided;honey  -      Dressing Dressing applied;foam;silver impregnated dressing;low-adherent   Mepilex Ag foam secured with 4\" optifoam border  -      Periwound Care cleansed with pH balanced cleanser;dry periwound area maintained  -        User Key  (r) = Recorded By, (t) = Taken By, (c) = Cosigned By    Initials Name Provider Type    NISHANT Howell, PT Physical Therapist              Lymphedema       02/01/17 0920          Lymphedema Edema Assessment    Ptting Edema Category By severity  -      Pitting Edema Mild  -MC      Skin Changes/Observations    Lower Extremity Color/Pigment left:;purple   purple around wound only. Remainder WNL  -MC      Lymphedema Pulses/Capillary Refill "    Lower Extremity Capillary Refill left:;less than 3 seconds  -MC      Compression/Skin Care    Skin Care washed/dried  -      Wrapping Location lower extremity  -MC      Wrapping Location LE left:;foot to knee  -      Bandage Layers cotton elastic stocking- single layer (comment size)   size 4 MH to ankle, size 5 ankle to calf  -        User Key  (r) = Recorded By, (t) = Taken By, (c) = Cosigned By    Initials Name Provider Type     Doreen Howell, PT Physical Therapist          WOUND DEBRIDEMENT  Debridement Site 1  Location- Site 1: L medial ankle  Selective Debridement- Site 1: Wound Surface <20cmsq  Instruments- Site 1: tweezers  Excised Tissue Description- Site 1: moderate, slough, other (comment) (periwound hypertrophic crust)  Bleeding- Site 1: none             Recommendation and Plan        PT Assessment/Plan       02/01/17 0920 01/26/17 1400       PT Assessment    Functional Limitations Performance in self-care ADL;Limitations in functional capacity and performance;Performance in leisure activities;Limitations in community activities  - Performance in self-care ADL;Limitations in functional capacity and performance;Performance in leisure activities;Limitations in community activities  -     Impairments Integumentary integrity;Pain  - Integumentary integrity;Pain  -     Assessment Comments Pt has met all her STGs for PT wound care at this time. The wound dimensions have decreased to 0.2 x 0.4 x 0.2 cm with the remainder of the area re-epithelialized. Pt still with periwound discoloration typical of venous stasis wounds. Pt with scant slough remaining in the wound bed that is difficult to debride due to the small wound opening. Pt still with mild BLE edema currently managed with light compression. Pt will continue to benefit from skilled PT wound care for debridement, edema management, and advanced dressings management.   - Medial L ankle ulcer re-epithelializing, with clean beefy  granulation in 80% of wound base after debridement of slough/biofilm today.  Pt would benefit from follow-up in one week for PRN debridement and dressing management until wound closure and patient able to tolerate compression stockings.  -     Rehab Potential Good  -MC Good  -     Patient/caregiver participated in establishment of treatment plan and goals Yes  -MC Yes  -JM     Patient would benefit from skilled therapy intervention Yes  -MC Yes  -JM     PT Plan    PT Frequency 1x/week  - 1x/week  -     Predicted Duration of Therapy Intervention (days/wks) 4 weeks  -      Planned CPT's? PT MULTI LAYER COMP SYS LE;PT THER SUPP EA 15 MIN;PT JASON DEBRIDE OPEN WOUND UP TO 20 CM: [id number]  -      Physical Therapy Interventions (Optional Details) patient/family education;wound care  - patient/family education;wound care  -     PT Plan Comments Follow up next Tuesday for debridement, dressings update prn, and compression stocking check as appropriate.  -        User Key  (r) = Recorded By, (t) = Taken By, (c) = Cosigned By    Initials Name Provider Type     Doreen Howell, PT Physical Therapist     Analy Carnes, PT Physical Therapist          Goals        PT OP Goals       17 0920 17 1400 17 1040    PT Short Term Goals    STG 1 Patient and/ or caregiver able to verbalize signs and symptoms of infection.  -      STG 1 Progress Met  -      STG 2 Decrease wound dimensions by 25% as evidence of wound closure.  -      STG 2 Progress Met  -      STG 3 Decrease non-viable / necrotic tissue by 90% to facilitate clean wound bed for healing.  -      STG 3 Progress Met  -      STG 4 Patient to report decrease in pain to 4/10, to facilitate improved functional mobility.  -      STG 4 Progress Met  -      STG 5 Patient independent and compliant with initial home exercise program focused on range of motion, and flexibility to improve blood flow to facilitate healing.   -      STG 5 Progress Met  -      Long Term Goals    LTG 1 Patient and/ or caregiver independent with clean dressing changes.  -      LTG 1 Progress Ongoing  -      LTG 2 Decrease wound dimensions by 75% as evidence of wound closure.  -      LTG 2 Progress Ongoing  -      LTG 3 Patient to report decrease in pain to 2/10, to facilitate improved functional mobility.  -      LTG 3 Progress Ongoing  -      LTG 4 Patient independent and compliant with use and care of compression stockings, with assistance of family / caregiver as indicated to promote self-care independence.  -      LTG 4 Progress Ongoing  -      Time Calculation    PT Goal Re-Cert Due Date 03/03/17  -NISHANT 02/03/17  -BOB 02/03/17  -BOB      User Key  (r) = Recorded By, (t) = Taken By, (c) = Cosigned By    Initials Name Provider Type    NISHANT Howell, PT Physical Therapist    BOB Carnes, PT Physical Therapist          PT Goal Re-Cert Due Date: 03/03/17  PT Short Term Goals  STG 1: Patient and/ or caregiver able to verbalize signs and symptoms of infection.  STG 1 Progress: Met  STG 2: Decrease wound dimensions by 25% as evidence of wound closure.  STG 2 Progress: Met  STG 3: Decrease non-viable / necrotic tissue by 90% to facilitate clean wound bed for healing.  STG 3 Progress: Met  STG 4: Patient to report decrease in pain to 4/10, to facilitate improved functional mobility.  STG 4 Progress: Met  STG 5: Patient independent and compliant with initial home exercise program focused on range of motion, and flexibility to improve blood flow to facilitate healing.  STG 5 Progress: Met  Long Term Goals  LTG 1: Patient and/ or caregiver independent with clean dressing changes.  LTG 1 Progress: Ongoing  LTG 2: Decrease wound dimensions by 75% as evidence of wound closure.  LTG 2 Progress: Ongoing  LTG 3: Patient to report decrease in pain to 2/10, to facilitate improved functional mobility.  LTG 3 Progress: Ongoing  LTG 4: Patient  independent and compliant with use and care of compression stockings, with assistance of family / caregiver as indicated to promote self-care independence.  LTG 4 Progress: Ongoing      Time Calculation: Start Time: 0920    Therapy Charges for Today     Code Description Service Date Service Provider Modifiers Qty    99023329468 HC PT OTHER PRIME FUNCT CURRENT 2/1/2017 Doreen Howell, PT GP, CI 1    65610670729 HC PT OTHER PRIME FUNCT PROJECTED 2/1/2017 Doreen Howell, PT GP,  1    10044261002 HC JASON DEBRIDE OPEN WOUND UP TO 20CM 2/1/2017 Doreen Howell, PT GP 1    [id number] HC PT MULTI LAYER COMP SYS BELOW KNEE 2/1/2017 Doreen Howell, PT GP 1          PT G-Codes  PT Professional Judgement Used?: Yes  Outcome Measure Options: BWAT (Peña-Norman Wound Assess Tool)  Functional Limitation: Other PT primary  Other PT Primary Current Status (): At least 1 percent but less than 20 percent impaired, limited or restricted  Other PT Primary Goal Status (): 0 percent impaired, limited or restricted     Doreen Howell, PT  2/1/2017

## 2017-02-07 ENCOUNTER — HOSPITAL ENCOUNTER (OUTPATIENT)
Dept: PHYSICAL THERAPY | Facility: HOSPITAL | Age: 67
Setting detail: THERAPIES SERIES
Discharge: HOME | End: 2017-02-07

## 2017-02-07 DIAGNOSIS — I83.023 VENOUS STASIS ULCER OF ANKLE, LEFT (HCC): Primary | ICD-10-CM

## 2017-02-07 DIAGNOSIS — L97.329 VENOUS STASIS ULCER OF ANKLE, LEFT (HCC): Primary | ICD-10-CM

## 2017-02-07 PROCEDURE — 97597 DBRDMT OPN WND 1ST 20 CM/<: CPT

## 2017-02-07 NOTE — PROGRESS NOTES
Outpatient Rehabilitation - Wound/Debridement Treatment Note  [facility]     Patient Name: Rosalie Amador  : 1950  MRN: [id number]  Today's Date: 2017                    Admit Date: 2017    Visit Dx:    ICD-10-CM ICD-9-CM   1. Venous stasis ulcer of ankle, left I83.023 454.0       Patient Active Problem List   Diagnosis   • Impaired fasting glucose   • Vitamin D deficiency   • Scalp cyst   • Osteopenia   • Obesity   • Non-toxic multinodular goiter   • Uterine leiomyoma   • Hypertension   • Hyperlipidemia   • Hemorrhoids   • Carotid atherosclerosis   • Malignant neoplasm of right female breast   • Palpitations   • Ecchymosis   • Bilateral edema of lower extremity   • Venous stasis ulcer of left lower extremity   • Medicare annual wellness visit, subsequent        Past Medical History   Diagnosis Date   • Hx of bone density study 2016     DEXA (3/16) L -1.4, H -1.7, repeat 3 yrs   • Hx of bone density study 2013     DEXA () L -1.7, H -0.6 per Dr. Sweeney   • Hx of colonoscopy 2010     colonosc (9/9/10): he,, suboptimal prep, repeat 5 yrs; GI - Dr. Sanchez   • Hx of Doppler ultrasound 2016     carotid u/s (16): no carotid stenoses bilaterally; intimal thickening   • Right wrist fracture 2016     ortho - Dr. Albin Kinney        Past Surgical History   Procedure Laterality Date   • Lipoma excision Left      s/p excision lipoma L. ventral forearm   • Modified radical mastectomy w/ axillary lymph node dissection Right      secondary to breast CA ('97)   • Tonsillectomy     • Tubal abdominal ligation           EVALUATION        PT Ortho       17 1000    Subjective Comments    Subjective Comments Pt reports she got knee-high compression stockings 20-30mmHg from [employer].  She has been wearing them, but used compressogrip today due to coming for treatment.  -BOB    Subjective Pain    Able to rate subjective pain? yes  -BOB    Pre-Treatment Pain Level 0  -JM     "Post-Treatment Pain Level 0  -JM    Transfers    Transfer, Comment Pt long sitting on stretcher for tx.  -      User Key  (r) = Recorded By, (t) = Taken By, (c) = Cosigned By    Initials Name Provider Type    BOB Carnes, PT Physical Therapist                    LDA Wound       02/07/17 1000          Wound 01/04/17 0900 Left medial ankle ulceration, venous    Wound - Properties Group Date first assessed: 01/04/17  - Time first assessed: 0900  - Side: Left  - Orientation: medial  - Location: ankle  - Type: ulceration, venous  -MC    Wound WDL ex   slight swelling and residual purple discoloration  -      Dressing Appearance dry;intact  -JM      Base moist;pink;reddened;yellow;slough;epithelialization   min yellow slough in depth of wound after debridement  -      Periwound Area ecchymotic;swelling  -      Edges open;irregular  -JM      Length (cm) 0.3  -JM      Width (cm) 0.4  -JM      Depth (cm) 0.2  -JM      Drainage Characteristics/Odor serosanguineous;no odor  -      Drainage Amount scant  -      Picture taken yes  -      Wound Cleaning cleansed with;other (see comments)   Phase One wound   -      Wound Interventions debrided  -      Dressing Dressing applied;foam;silver impregnated dressing;low-adherent   mepilex ag foam, 4\" optifoam gentle  -      Periwound Care cleansed with pH balanced cleanser;dry periwound area maintained  -        User Key  (r) = Recorded By, (t) = Taken By, (c) = Cosigned By    Initials Name Provider Type     Doreen Howell, PT Physical Therapist    BOB Carnes, PT Physical Therapist              Lymphedema       02/07/17 1000          Lymphedema Edema Assessment    Ptting Edema Category By severity  -      Pitting Edema Mild  -      Compression/Skin Care    Skin Care washed/dried   theraworx wipes  -      Wrapping Location LE left:;foot to knee  -      Bandage Layers cotton elastic stocking- single layer (comment " size);cotton elastic stocking- double layer (comment size)   size 5 on calf, size 4 dbl on foot/ankle  -        User Key  (r) = Recorded By, (t) = Taken By, (c) = Cosigned By    Initials Name Provider Type    BOB Carnes, PT Physical Therapist          WOUND DEBRIDEMENT  Debridement Site 1  Location- Site 1: L medial ankle  Selective Debridement- Site 1: Wound Surface <20cmsq  Instruments- Site 1: tweezers  Necrotic Tissue- Site 1: 100 % Pre  Excised Tissue Description- Site 1: moderate, slough  Residual Necrotic Tissue- Site 1: 0 % post  Bleeding- Site 1: none             Recommendation and Plan        PT Assessment/Plan       02/07/17 1000 02/01/17 0920       PT Assessment    Functional Limitations Performance in self-care ADL;Limitations in functional capacity and performance;Performance in leisure activities;Limitations in community activities  - Performance in self-care ADL;Limitations in functional capacity and performance;Performance in leisure activities;Limitations in community activities  -     Impairments Integumentary integrity;Pain  - Integumentary integrity;Pain  -     Assessment Comments Wound bed clean and pink after debridement of thin loose slough.  Area decreased to 0.3cm x 0.4cm.  Anticipate closure in about 1 week.  Pt to continue with home dressings and compression stocking use, and follow up in 1 week.  - Pt has met all her STGs for PT wound care at this time. The wound dimensions have decreased to 0.2 x 0.4 x 0.2 cm with the remainder of the area re-epithelialized. Pt still with periwound discoloration typical of venous stasis wounds. Pt with scant slough remaining in the wound bed that is difficult to debride due to the small wound opening. Pt still with mild BLE edema currently managed with light compression. Pt will continue to benefit from skilled PT wound care for debridement, edema management, and advanced dressings management.   -     Rehab Potential Good  -BOB  Good  -     Patient/caregiver participated in establishment of treatment plan and goals Yes  - Yes  -     Patient would benefit from skilled therapy intervention Yes  - Yes  -MC     PT Plan    PT Frequency 1x/week  - 1x/week  -     Predicted Duration of Therapy Intervention (days/wks)  4 weeks  -     Planned CPT's?  PT MULTI LAYER COMP SYS LE;PT THER SUPP EA 15 MIN;PT JASON DEBRIDE OPEN WOUND UP TO 20 CM: [id number]  -     Physical Therapy Interventions (Optional Details) patient/family education;wound care  - patient/family education;wound care  -     PT Plan Comments Follow-up in 1 week, may cancel if wound closed.  - Follow up next Tuesday for debridement, dressings update prn, and compression stocking check as appropriate.  -       User Key  (r) = Recorded By, (t) = Taken By, (c) = Cosigned By    Initials Name Provider Type     Doreen Howell, PT Physical Therapist     Analy Carnes, PT Physical Therapist          Goals        PT OP Goals       17 1000 17 0920 17 1400    PT Short Term Goals    STG 1  Patient and/ or caregiver able to verbalize signs and symptoms of infection.  -     STG 1 Progress  Met  -     STG 2  Decrease wound dimensions by 25% as evidence of wound closure.  -     STG 2 Progress  Met  -     STG 3  Decrease non-viable / necrotic tissue by 90% to facilitate clean wound bed for healing.  -     STG 3 Progress  Met  -     STG 4  Patient to report decrease in pain to 4/10, to facilitate improved functional mobility.  -     STG 4 Progress  Met  -     STG 5  Patient independent and compliant with initial home exercise program focused on range of motion, and flexibility to improve blood flow to facilitate healing.  -     STG 5 Progress  Met  -     Long Term Goals    LTG 1  Patient and/ or caregiver independent with clean dressing changes.  -     LTG 1 Progress  Ongoing  -     LTG 2  Decrease wound dimensions by 75% as  evidence of wound closure.  -     LTG 2 Progress  Ongoing  -     LTG 3  Patient to report decrease in pain to 2/10, to facilitate improved functional mobility.  -     LTG 3 Progress  Ongoing  -     LTG 4  Patient independent and compliant with use and care of compression stockings, with assistance of family / caregiver as indicated to promote self-care independence.  -     LTG 4 Progress  Ongoing  -     Time Calculation    PT Goal Re-Cert Due Date 03/03/17  -BOB 03/03/17  - 02/03/17  -      User Key  (r) = Recorded By, (t) = Taken By, (c) = Cosigned By    Initials Name Provider Type     Doreen Howell, PT Physical Therapist    BOB Carnes, PT Physical Therapist          PT Goal Re-Cert Due Date: 03/03/17            Time Calculation: Start Time: 1000    Therapy Charges for Today     Code Description Service Date Service Provider Modifiers Qty    [id number]  JASON DEBRIDE OPEN WOUND UP TO 20CM 2/7/2017 Analy Carnes, PT GP 1    [id number]  PT THER SUPP EA 15 MIN 2/7/2017 Analy Carnes, PT GP 1                Analy Carnes, PT  2/7/2017

## 2017-02-10 ENCOUNTER — TELEPHONE (OUTPATIENT)
Dept: GASTROENTEROLOGY | Facility: CLINIC | Age: 67
End: 2017-02-10

## 2017-02-10 NOTE — TELEPHONE ENCOUNTER
Patient called & stated that Dr. Sanchez request a 48 hours prep on last recall. I explained to her that she will start Preparations on Tues with clear & magnesium citrate, Wednesday the day before procedure; its clear liquids & start bowel prep at 6 pm & follow instructions that was sent to her in the mail. Zofran will be called in on Monday for nausea for Colon preparation. Patient voiced understanding.

## 2017-02-13 DIAGNOSIS — R11.0 NAUSEA: Primary | ICD-10-CM

## 2017-02-13 RX ORDER — ONDANSETRON 4 MG/1
4 TABLET, FILM COATED ORAL EVERY 6 HOURS PRN
Qty: 6 TABLET | Refills: 0 | Status: SHIPPED | OUTPATIENT
Start: 2017-02-13 | End: 2018-08-23

## 2017-02-14 ENCOUNTER — HOSPITAL ENCOUNTER (OUTPATIENT)
Dept: PHYSICAL THERAPY | Facility: HOSPITAL | Age: 67
Setting detail: THERAPIES SERIES
Discharge: HOME | End: 2017-02-14

## 2017-02-14 DIAGNOSIS — I83.023 VENOUS STASIS ULCER OF ANKLE, LEFT (HCC): Primary | ICD-10-CM

## 2017-02-14 DIAGNOSIS — L97.329 VENOUS STASIS ULCER OF ANKLE, LEFT (HCC): Primary | ICD-10-CM

## 2017-02-14 PROCEDURE — G8991 OTHER PT/OT GOAL STATUS: HCPCS

## 2017-02-14 PROCEDURE — G8992 OTHER PT/OT  D/C STATUS: HCPCS

## 2017-02-14 PROCEDURE — G8990 OTHER PT/OT CURRENT STATUS: HCPCS

## 2017-02-14 PROCEDURE — 97597 DBRDMT OPN WND 1ST 20 CM/<: CPT

## 2017-02-14 NOTE — THERAPY DISCHARGE NOTE
Outpatient Rehabilitation - Wound/Debridement Treatment Note &  Discharge Summary  [facility]     Patient Name: Rosalie Amador  : 1950  MRN: [id number]  Today's Date: 2017            [facility] LLE:          Admit Date: 2017    Visit Dx:    ICD-10-CM ICD-9-CM   1. Venous stasis ulcer of ankle, left I83.023 454.0       Patient Active Problem List   Diagnosis   • Impaired fasting glucose   • Vitamin D deficiency   • Scalp cyst   • Osteopenia   • Obesity   • Non-toxic multinodular goiter   • Uterine leiomyoma   • Hypertension   • Hyperlipidemia   • Hemorrhoids   • Carotid atherosclerosis   • Malignant neoplasm of right female breast   • Palpitations   • Ecchymosis   • Bilateral edema of lower extremity   • Venous stasis ulcer of left lower extremity   • Medicare annual wellness visit, subsequent        Past Medical History   Diagnosis Date   • Hx of bone density study 2016     DEXA (3/16) L -1.4, H -1.7, repeat 3 yrs   • Hx of bone density study 2013     DEXA () L -1.7, H -0.6 per Dr. Sweenye   • Hx of colonoscopy 2010     colonosc (9/9/10): he,, suboptimal prep, repeat 5 yrs; GI - Dr. Sanchez   • Hx of Doppler ultrasound 2016     carotid u/s (16): no carotid stenoses bilaterally; intimal thickening   • Right wrist fracture 2016     ortho - Dr. Albin Kinney        Past Surgical History   Procedure Laterality Date   • Lipoma excision Left      s/p excision lipoma L. ventral forearm   • Modified radical mastectomy w/ axillary lymph node dissection Right      secondary to breast CA (')   • Tonsillectomy     • Tubal abdominal ligation  1982         EVALUATION        PT Ortho       17 0950    Subjective Comments    Subjective Comments Pt without complaints, no pain, is keeping ankle area bandaged and using compression stockings.  -JM    Subjective Pain    Able to rate subjective pain? yes  -JM    Pre-Treatment Pain Level 0  -JM    Post-Treatment Pain Level 0   "-JM    Transfers    Transfer, Comment Pt long sitting on stretcher for tx.  -      User Key  (r) = Recorded By, (t) = Taken By, (c) = Cosigned By    Initials Name Provider Type    BOB Carnes, PT Physical Therapist                    LDA Wound       02/14/17 0950          Wound 01/04/17 0900 Left medial ankle ulceration, venous    Wound - Properties Group Date first assessed: 01/04/17  - Time first assessed: 0900  - Side: Left  - Orientation: medial  - Location: ankle  - Type: ulceration, venous  -    Wound WDL ex   slight swelling and residual purple discoloration  -      Dressing Appearance dry;intact  -JM      Base moist;pink;epithelialization  -      Periwound Area ecchymotic;swelling  -JM      Edges open;irregular  -JM      Length (cm) 0.1  -JM      Width (cm) 0.1  -JM      Depth (cm) 0  -JM      Drainage Characteristics/Odor serosanguineous;no odor  -JM      Drainage Amount scant  -      Picture taken yes  -      Wound Cleaning cleansed with;other (see comments)   phase one  -      Wound Interventions debrided  -      Dressing Dressing applied;foam;low-adherent   4\" optifoam gentle  -JM      Periwound Care cleansed with pH balanced cleanser;dry periwound area maintained;other (see comments)   assisted pt to don knee-high stocking 20-30mmHg  -        User Key  (r) = Recorded By, (t) = Taken By, (c) = Cosigned By    Initials Name Provider Type    NISHANT Howell, PT Physical Therapist    BOB Carnes, PT Physical Therapist            WOUND DEBRIDEMENT  Debridement Site 1  Location- Site 1: L medial ankle  Selective Debridement- Site 1: Wound Surface <20cmsq  Instruments- Site 1: tweezers  Necrotic Tissue- Site 1: 100 % Pre  Excised Tissue Description- Site 1: moderate, other (comment) (dried hypertrophic debris or exudate)  Residual Necrotic Tissue- Site 1: 0 % post  Bleeding- Site 1: none             Therapy Education  Given: Symptoms/condition management, " Bandaging/dressing change, Other (comment), Edema management (continue with home dressings for one week)  Program: Reinforced  How Provided: Verbal, Demonstration  Provided to: Patient  Level of Understanding: Verbalized    Recommendation and Plan        PT Assessment/Plan       02/14/17 0950          PT Assessment    Functional Limitations Performance in self-care ADL;Limitations in functional capacity and performance;Performance in leisure activities;Limitations in community activities  -      Impairments Integumentary integrity;Pain  -      Assessment Comments Pt has met all PT goals, wound area only 0.1cm x 0.1cm, and should close in next couple days.  Pt now using knee-high 20-30mmHg compression stockings for venous stasis.  No further skilled PT needs at this time.  -      Rehab Potential Good  -      Patient/caregiver participated in establishment of treatment plan and goals Yes  -      Patient would benefit from skilled therapy intervention No  -      PT Plan    PT Frequency Other (comment)   discharge today  -        User Key  (r) = Recorded By, (t) = Taken By, (c) = Cosigned By    Initials Name Provider Type    BOB Carnes, PT Physical Therapist          Goals        PT OP Goals       02/14/17 0950 02/07/17 1000 02/01/17 0920    PT Short Term Goals    STG 1 Patient and/ or caregiver able to verbalize signs and symptoms of infection.  -  Patient and/ or caregiver able to verbalize signs and symptoms of infection.  -    STG 1 Progress Met  -  Met  -    STG 2 Decrease wound dimensions by 25% as evidence of wound closure.  -JM  Decrease wound dimensions by 25% as evidence of wound closure.  -    STG 2 Progress Met  -  Met  -    STG 3 Decrease non-viable / necrotic tissue by 90% to facilitate clean wound bed for healing.  -JM  Decrease non-viable / necrotic tissue by 90% to facilitate clean wound bed for healing.  -    STG 3 Progress Met  -  Met  -    STG 4 Patient to  report decrease in pain to 4/10, to facilitate improved functional mobility.  -  Patient to report decrease in pain to 4/10, to facilitate improved functional mobility.  -    STG 4 Progress Met  -  Met  -    STG 5 Patient independent and compliant with initial home exercise program focused on range of motion, and flexibility to improve blood flow to facilitate healing.  -  Patient independent and compliant with initial home exercise program focused on range of motion, and flexibility to improve blood flow to facilitate healing.  -    STG 5 Progress Met  -  Met  -    Long Term Goals    LTG 1 Patient and/ or caregiver independent with clean dressing changes.  -  Patient and/ or caregiver independent with clean dressing changes.  -    LTG 1 Progress Met  -  Ongoing  -    LTG 2 Decrease wound dimensions by 75% as evidence of wound closure.  -  Decrease wound dimensions by 75% as evidence of wound closure.  -    LTG 2 Progress Met  -  Ongoing  -    LTG 3 Patient to report decrease in pain to 2/10, to facilitate improved functional mobility.  -  Patient to report decrease in pain to 2/10, to facilitate improved functional mobility.  -    LTG 3 Progress Met  -  Ongoing  -    LTG 4 Patient independent and compliant with use and care of compression stockings, with assistance of family / caregiver as indicated to promote self-care independence.  -  Patient independent and compliant with use and care of compression stockings, with assistance of family / caregiver as indicated to promote self-care independence.  -    LTG 4 Progress Met  -  Ongoing  -    Time Calculation    PT Goal Re-Cert Due Date  03/03/17  - 03/03/17  -      User Key  (r) = Recorded By, (t) = Taken By, (c) = Cosigned By    Initials Name Provider Type    NISHANT Howell, PT Physical Therapist    BOB Carnes, PT Physical Therapist          Time Calculation: Start Time: 0950    Therapy Charges for  Today     Code Description Service Date Service Provider Modifiers Qty    94005517043 HC PT OTHER PRIME FUNCT CURRENT 2/14/2017 Analy Carnes, PT GP, CH 1    84140023318 HC PT OTHER PRIME FUNCT PROJECTED 2/14/2017 Analy Carnes, PT GP, CH 1    [id number] HC PT OTHER PRIME FUNCT DISCHARGE 2/14/2017 Analy Carnes, PT GP,  1    [id number] HC JASON DEBRIDE OPEN WOUND UP TO 20CM 2/14/2017 Analy Carnes, PT GP 1    [id number] HC PT THER SUPP EA 15 MIN 2/14/2017 Analy Carnes, PT GP 1          PT G-Codes  PT Professional Judgement Used?: Yes  Outcome Measure Options: BWAT (Peña-Norman Wound Assess Tool)  Score: 17/80  Functional Limitation: Other PT primary  Other PT Primary Current Status (): 0 percent impaired, limited or restricted  Other PT Primary Goal Status (): 0 percent impaired, limited or restricted  Other PT Primary Discharge Status (): 0 percent impaired, limited or restricted           OP Discharge Summary       02/14/17 0950          OP PT Discharge Summary    Date of Discharge 02/14/17  -      Reason for Discharge All goals achieved  -      Outcomes Achieved Able to achieve all goals within established timeline  -      Discharge Destination Home without follow-up  -      Discharge Instructions Pt to keep remaining wound area covered with optifoam gentle dressing for one more week or PRN to protect new skin.  Pt to continue use of compression stockings.  May shower without dressing on ankle area.  Follow up with MD for any other concerns, will discharge from PT wound care.  -        User Key  (r) = Recorded By, (t) = Taken By, (c) = Cosigned By    Initials Name Provider Type    BOB Carnes, PT Physical Therapist          Analy Carnes, PT  2/14/2017

## 2017-02-16 ENCOUNTER — OUTSIDE FACILITY SERVICE (OUTPATIENT)
Dept: GASTROENTEROLOGY | Facility: CLINIC | Age: 67
End: 2017-02-16

## 2017-02-16 ENCOUNTER — LAB REQUISITION (OUTPATIENT)
Dept: LAB | Facility: HOSPITAL | Age: 67
End: 2017-02-16

## 2017-02-16 DIAGNOSIS — D12.0 BENIGN NEOPLASM OF CECUM: ICD-10-CM

## 2017-02-16 PROCEDURE — G0500 MOD SEDAT ENDO SERVICE >5YRS: HCPCS | Performed by: INTERNAL MEDICINE

## 2017-02-16 PROCEDURE — 45385 COLONOSCOPY W/LESION REMOVAL: CPT | Performed by: INTERNAL MEDICINE

## 2017-02-16 PROCEDURE — 88305 TISSUE EXAM BY PATHOLOGIST: CPT | Performed by: INTERNAL MEDICINE

## 2017-02-17 LAB
CYTO UR: NORMAL
LAB AP CASE REPORT: NORMAL
LAB AP CLINICAL INFORMATION: NORMAL
Lab: NORMAL
PATH REPORT.FINAL DX SPEC: NORMAL
PATH REPORT.GROSS SPEC: NORMAL

## 2017-02-18 PROBLEM — D12.6 TUBULAR ADENOMA OF COLON: Status: ACTIVE | Noted: 2017-02-18

## 2017-09-05 ENCOUNTER — OFFICE VISIT (OUTPATIENT)
Dept: INTERNAL MEDICINE | Facility: CLINIC | Age: 67
End: 2017-09-05

## 2017-09-05 VITALS
BODY MASS INDEX: 31.58 KG/M2 | SYSTOLIC BLOOD PRESSURE: 130 MMHG | DIASTOLIC BLOOD PRESSURE: 74 MMHG | WEIGHT: 213.85 LBS

## 2017-09-05 DIAGNOSIS — R73.01 IMPAIRED FASTING GLUCOSE: Primary | ICD-10-CM

## 2017-09-05 DIAGNOSIS — I10 ESSENTIAL HYPERTENSION: ICD-10-CM

## 2017-09-05 DIAGNOSIS — I83.029 VENOUS STASIS ULCER OF LEFT LOWER EXTREMITY (HCC): ICD-10-CM

## 2017-09-05 DIAGNOSIS — I65.23 CAROTID ATHEROSCLEROSIS, BILATERAL: ICD-10-CM

## 2017-09-05 DIAGNOSIS — L97.929 VENOUS STASIS ULCER OF LEFT LOWER EXTREMITY (HCC): ICD-10-CM

## 2017-09-05 LAB — HBA1C MFR BLD: 5.5 %

## 2017-09-05 PROCEDURE — 99214 OFFICE O/P EST MOD 30 MIN: CPT | Performed by: INTERNAL MEDICINE

## 2017-09-05 PROCEDURE — 90656 IIV3 VACC NO PRSV 0.5 ML IM: CPT | Performed by: INTERNAL MEDICINE

## 2017-09-05 PROCEDURE — 83036 HEMOGLOBIN GLYCOSYLATED A1C: CPT | Performed by: INTERNAL MEDICINE

## 2017-09-05 PROCEDURE — 90471 IMMUNIZATION ADMIN: CPT | Performed by: INTERNAL MEDICINE

## 2017-09-05 NOTE — ASSESSMENT & PLAN NOTE
"Complains of \"episodes\" that last 1-2 seconds but increasing in frequency 1-2/month (versus previous 1-2x/year); no focal neurologic deficits; no assoc'd sxs to better delineate etiology or plan for workup; if sxs cont/worsen, rec re-eval as wellas brain MRI and consideration for neuro consult; plan to repeat carotid u/s for surveillance in 2018  "

## 2017-09-05 NOTE — ASSESSMENT & PLAN NOTE
BG control good with A1C 5.5; encouraged reg phys activity to decr insulin resistance, moderation in unhealthy starches/sweets; f/u A1C with next wellness visit in 2/18

## 2018-02-16 ENCOUNTER — OFFICE VISIT (OUTPATIENT)
Dept: INTERNAL MEDICINE | Facility: CLINIC | Age: 68
End: 2018-02-16

## 2018-02-16 VITALS
SYSTOLIC BLOOD PRESSURE: 136 MMHG | WEIGHT: 216.5 LBS | RESPIRATION RATE: 16 BRPM | HEIGHT: 68 IN | BODY MASS INDEX: 32.81 KG/M2 | DIASTOLIC BLOOD PRESSURE: 78 MMHG | HEART RATE: 62 BPM

## 2018-02-16 DIAGNOSIS — E55.9 VITAMIN D DEFICIENCY: ICD-10-CM

## 2018-02-16 DIAGNOSIS — L72.9 SCALP CYST: ICD-10-CM

## 2018-02-16 DIAGNOSIS — I83.029 VENOUS STASIS ULCER OF LEFT LOWER EXTREMITY (HCC): ICD-10-CM

## 2018-02-16 DIAGNOSIS — H61.23 IMPACTED CERUMEN, BILATERAL: ICD-10-CM

## 2018-02-16 DIAGNOSIS — R73.01 IMPAIRED FASTING GLUCOSE: ICD-10-CM

## 2018-02-16 DIAGNOSIS — E78.2 MIXED HYPERLIPIDEMIA: ICD-10-CM

## 2018-02-16 DIAGNOSIS — Z00.00 MEDICARE ANNUAL WELLNESS VISIT, SUBSEQUENT: Primary | ICD-10-CM

## 2018-02-16 DIAGNOSIS — L97.929 VENOUS STASIS ULCER OF LEFT LOWER EXTREMITY (HCC): ICD-10-CM

## 2018-02-16 DIAGNOSIS — I10 ESSENTIAL HYPERTENSION: ICD-10-CM

## 2018-02-16 DIAGNOSIS — E04.2 NON-TOXIC MULTINODULAR GOITER: ICD-10-CM

## 2018-02-16 PROCEDURE — 93000 ELECTROCARDIOGRAM COMPLETE: CPT | Performed by: INTERNAL MEDICINE

## 2018-02-16 PROCEDURE — G0439 PPPS, SUBSEQ VISIT: HCPCS | Performed by: INTERNAL MEDICINE

## 2018-02-16 PROCEDURE — 99397 PER PM REEVAL EST PAT 65+ YR: CPT | Performed by: INTERNAL MEDICINE

## 2018-02-16 PROCEDURE — 96160 PT-FOCUSED HLTH RISK ASSMT: CPT | Performed by: INTERNAL MEDICINE

## 2018-02-16 PROCEDURE — G0444 DEPRESSION SCREEN ANNUAL: HCPCS | Performed by: INTERNAL MEDICINE

## 2018-02-16 PROCEDURE — 69210 REMOVE IMPACTED EAR WAX UNI: CPT | Performed by: INTERNAL MEDICINE

## 2018-02-16 NOTE — ASSESSMENT & PLAN NOTE
R. crown of head, appears benign but if enlarging, rec excision - patient wants to consider; ddx includes nodule
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
## 2018-02-16 NOTE — ASSESSMENT & PLAN NOTE
Resolved with chronic hyperpigmentation; asx, therefore do not rec vasc surgery consultation at this time

## 2018-02-16 NOTE — ASSESSMENT & PLAN NOTE
Update A1c; encouraged reg phys activity to decr insulin resistance, moderation in unhealthy starches/sweets; f/u A1C in 6 mos
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
## 2018-02-17 LAB
25(OH)D3+25(OH)D2 SERPL-MCNC: 21 NG/ML
ALBUMIN SERPL-MCNC: 4.4 G/DL (ref 3.2–4.8)
ALBUMIN/CREAT UR: ABNORMAL MG/G CREAT (ref 0–30)
ALBUMIN/GLOB SERPL: 1.5 G/DL (ref 1.5–2.5)
ALP SERPL-CCNC: 70 U/L (ref 25–100)
ALT SERPL-CCNC: 25 U/L (ref 7–40)
APPEARANCE UR: CLEAR
AST SERPL-CCNC: 26 U/L (ref 0–33)
BACTERIA #/AREA URNS HPF: NORMAL /HPF
BASOPHILS # BLD AUTO: 0.02 10*3/MM3 (ref 0–0.2)
BASOPHILS NFR BLD AUTO: 0.3 % (ref 0–1)
BILIRUB SERPL-MCNC: 0.9 MG/DL (ref 0.3–1.2)
BILIRUB UR QL STRIP: NEGATIVE
BUN SERPL-MCNC: 14 MG/DL (ref 9–23)
BUN/CREAT SERPL: 20 (ref 7–25)
CALCIUM SERPL-MCNC: 9.2 MG/DL (ref 8.7–10.4)
CASTS URNS MICRO: NORMAL
CHLORIDE SERPL-SCNC: 102 MMOL/L (ref 99–109)
CHOLEST SERPL-MCNC: 206 MG/DL (ref 0–200)
CO2 SERPL-SCNC: 27 MMOL/L (ref 20–31)
COLOR UR: YELLOW
CREAT SERPL-MCNC: 0.7 MG/DL (ref 0.6–1.3)
CREAT UR-MCNC: 6.6 MG/DL
EOSINOPHIL # BLD AUTO: 0.2 10*3/MM3 (ref 0–0.3)
EOSINOPHIL NFR BLD AUTO: 2.7 % (ref 0–3)
EPI CELLS #/AREA URNS HPF: NORMAL /HPF
ERYTHROCYTE [DISTWIDTH] IN BLOOD BY AUTOMATED COUNT: 13.7 % (ref 11.3–14.5)
GFR SERPLBLD CREATININE-BSD FMLA CKD-EPI: 101 ML/MIN/1.73
GFR SERPLBLD CREATININE-BSD FMLA CKD-EPI: 83 ML/MIN/1.73
GLOBULIN SER CALC-MCNC: 2.9 GM/DL
GLUCOSE SERPL-MCNC: 75 MG/DL (ref 70–100)
GLUCOSE UR QL: NEGATIVE
HBA1C MFR BLD: 5.7 % (ref 4.8–5.6)
HCT VFR BLD AUTO: 42.5 % (ref 34.5–44)
HDLC SERPL-MCNC: 55 MG/DL (ref 40–60)
HGB BLD-MCNC: 14.1 G/DL (ref 11.5–15.5)
HGB UR QL STRIP: NEGATIVE
IMM GRANULOCYTES # BLD: 0.01 10*3/MM3 (ref 0–0.03)
IMM GRANULOCYTES NFR BLD: 0.1 % (ref 0–0.6)
KETONES UR QL STRIP: NEGATIVE
LDLC SERPL CALC-MCNC: 131 MG/DL (ref 0–100)
LEUKOCYTE ESTERASE UR QL STRIP: NEGATIVE
LYMPHOCYTES # BLD AUTO: 2.43 10*3/MM3 (ref 0.6–4.8)
LYMPHOCYTES NFR BLD AUTO: 32.8 % (ref 24–44)
MCH RBC QN AUTO: 30.8 PG (ref 27–31)
MCHC RBC AUTO-ENTMCNC: 33.2 G/DL (ref 32–36)
MCV RBC AUTO: 92.8 FL (ref 80–99)
MICROALBUMIN UR-MCNC: <3 UG/ML
MONOCYTES # BLD AUTO: 0.34 10*3/MM3 (ref 0–1)
MONOCYTES NFR BLD AUTO: 4.6 % (ref 0–12)
NEUTROPHILS # BLD AUTO: 4.41 10*3/MM3 (ref 1.5–8.3)
NEUTROPHILS NFR BLD AUTO: 59.5 % (ref 41–71)
NITRITE UR QL STRIP: NEGATIVE
PH UR STRIP: 6.5 [PH] (ref 5–8)
PLATELET # BLD AUTO: 201 10*3/MM3 (ref 150–450)
POTASSIUM SERPL-SCNC: 3.9 MMOL/L (ref 3.5–5.5)
PROT SERPL-MCNC: 7.3 G/DL (ref 5.7–8.2)
PROT UR QL STRIP: NEGATIVE
RBC # BLD AUTO: 4.58 10*6/MM3 (ref 3.89–5.14)
RBC #/AREA URNS HPF: NORMAL /HPF
SODIUM SERPL-SCNC: 138 MMOL/L (ref 132–146)
SP GR UR: (no result) (ref 1–1.03)
T4 FREE SERPL-MCNC: 1.26 NG/DL (ref 0.89–1.76)
TRIGL SERPL-MCNC: 102 MG/DL (ref 0–150)
TSH SERPL DL<=0.005 MIU/L-ACNC: 1.47 MIU/ML (ref 0.35–5.35)
UROBILINOGEN UR STRIP-MCNC: (no result) MG/DL
VLDLC SERPL CALC-MCNC: 20.4 MG/DL
WBC # BLD AUTO: 7.41 10*3/MM3 (ref 3.5–10.8)
WBC #/AREA URNS HPF: NORMAL /HPF

## 2018-02-20 NOTE — PROGRESS NOTES
Dear Ms. Perry,    Thank you for obtaining the laboratories that we ordered.  I have received those results and would like to review them with you.    Your blood counts, thyroid tests, electrolytes, and urine test are within normal limits.  This indicates no anemia as well as normal thyroid, liver, and kidney function.  Your hemoglobin AC, the 3-month average of blood sugars, remains stable at 5.7 (previously 5.5 in 9/17, normal < 5.4).    Your cholesterol profile remains borderline but stable, showing a total cholesterol of 206 (goal < 200).  Your triglycerides are acceptable at 102 with a goal < 150.  Your HDL, the good cholesterol, is 55.  HDL is heart protective, and the goal is > 50 in women.  Your LDL, the bad cholesterol, is 131.  Goal for LDL is < 130, ideally < 100.     The only abnormality in your labs is a low vitamin D level at 21 (goal > 30).  Since you are already taking 1000 units daily, please increase this to 2000 units daily over-the-counter vitamin D3 supplementation.    Overall your labs are stable.  Let me know if you have any questions or concerns regarding these results; otherwise we will plan to follow-up on the A1C and vitamin D in 6 months as scheduled (labs will not need to be fasting).      Sincerely,  Lisset Godwin MD

## 2018-03-22 ENCOUNTER — TELEPHONE (OUTPATIENT)
Dept: INTERNAL MEDICINE | Facility: CLINIC | Age: 68
End: 2018-03-22

## 2018-03-22 DIAGNOSIS — L72.9 SCALP CYST: Primary | ICD-10-CM

## 2018-03-22 NOTE — TELEPHONE ENCOUNTER
PATIENT WAS IN FEB 16 2018 AND SHE STATES THAT AT HER VISIT DR BRASWELL AND HER DICUSSED ABOUT A CYST  THAT SHE HAS ON THE TOP OF HER HEAD AND AT THE TIME DR BRASWELL WAS WILLING TO GIVE THE PATIENT A REFERRAL BUT PT STATED SHE WOULD WAIT TIL HER NEXT FOLLOW UP APPOINTMENT WITH DR BRASWELL. THE PATIENT WOULD NOW LIKE TO GET A RECOMMENDATION TO EITHER A DERMATOLOGIST OR A SURGEON. THE PATIENT WOULD LIKE TO GET A CALL BACK [phone] IN REGARDS TO THIS MATTER.

## 2018-03-30 DIAGNOSIS — I10 ESSENTIAL HYPERTENSION: ICD-10-CM

## 2018-03-30 RX ORDER — LISINOPRIL 20 MG/1
TABLET ORAL
Qty: 90 TABLET | Refills: 2 | Status: SHIPPED | OUTPATIENT
Start: 2018-03-30 | End: 2018-12-22 | Stop reason: SDUPTHER

## 2018-08-23 ENCOUNTER — OFFICE VISIT (OUTPATIENT)
Dept: INTERNAL MEDICINE | Facility: CLINIC | Age: 68
End: 2018-08-23

## 2018-08-23 VITALS
HEART RATE: 57 BPM | SYSTOLIC BLOOD PRESSURE: 132 MMHG | WEIGHT: 219.25 LBS | DIASTOLIC BLOOD PRESSURE: 74 MMHG | RESPIRATION RATE: 16 BRPM | BODY MASS INDEX: 33.09 KG/M2

## 2018-08-23 DIAGNOSIS — R73.01 IMPAIRED FASTING GLUCOSE: Primary | ICD-10-CM

## 2018-08-23 DIAGNOSIS — E55.9 VITAMIN D DEFICIENCY: ICD-10-CM

## 2018-08-23 DIAGNOSIS — E04.2 NON-TOXIC MULTINODULAR GOITER: ICD-10-CM

## 2018-08-23 DIAGNOSIS — I10 ESSENTIAL HYPERTENSION: ICD-10-CM

## 2018-08-23 DIAGNOSIS — L72.9 SCALP CYST: ICD-10-CM

## 2018-08-23 PROBLEM — H25.9 AGE-RELATED CATARACT OF RIGHT EYE: Status: ACTIVE | Noted: 2018-08-23

## 2018-08-23 LAB — HBA1C MFR BLD: 5.4 %

## 2018-08-23 PROCEDURE — 83036 HEMOGLOBIN GLYCOSYLATED A1C: CPT | Performed by: INTERNAL MEDICINE

## 2018-08-23 PROCEDURE — 99214 OFFICE O/P EST MOD 30 MIN: CPT | Performed by: INTERNAL MEDICINE

## 2018-08-23 NOTE — ASSESSMENT & PLAN NOTE
BP bord/stable; on lisin 20mg QD; rec low Na diet, increased aerobic exercise; home BP monitoring with goal BP < 130/80

## 2018-08-23 NOTE — PROGRESS NOTES
Chief Complaint   Patient presents with   • impaired fasting glucose       History of Present Illness  68 y.o.  woman presents for sugar follow-up.  Has been taking her vitamin D supplementation.    Brings notes regarding excision of benign scalp cyst per Dr. Simons.    Has a list of questions including thyroid ultrasound for multinodular goiter, vaccinations, right cataract, left floaters, heartburn, bloating, and gas.    States that she had a good expressive Dr. Simons, has successful excision of scalp cyst which healed well, to the point she can't even feel where it is at this point in time.    Notes updated eye exam with Dr. Cooper Monday 3 days ago which she was noted to have right cataract and floaters in the left eye.  Both are being followed clinically.    Reports occasional episodes of heartburn but more so with bloating and gas.  Wonders what she can take for this.  Notes she has been trying to eat more healthfully and notes symptoms are triggered such as by broccoli.    Review of Systems  ROS (+) for increased gas, bloating, heartburn.  Denies chest pain, palpitations, shortness breath, nausea/vomiting.  All other ROS reviewed and negative.    [facility]  The following portions of the patient's history were reviewed and updated as appropriate: allergies, current medications, past family history, past medical history, past social history, past surgical history and problem list.    Current Outpatient Prescriptions:   •  aspirin 81 MG tablet, QD  •  Calcium Carbonate-Vitamin D (CALCIUM 500 + D PO), QD  •  Cholecalciferol (VITAMIN D3) 1000 UNITS QD  •  Coenzyme Q10 (COQ-10) 200 MG capsule, QD  •  lisinopril (PRINIVIL,ZESTRIL) 20 MG tablet, QD  •  Multiple Vitamin (MULTIVITAMINS PO), QD    VITALS:  /74 (BP Location: Left arm, Patient Position: Sitting)   Pulse 57   Resp 16   Wt 99.5 kg (219 lb 4 oz)   BMI 33.09 kg/m²     Physical Exam   Constitutional: She is oriented to person, place, and time.  She appears well-developed and well-nourished.   Eyes: Conjunctivae and EOM are normal.   Cardiovascular: Normal rate, regular rhythm and normal heart sounds.    Pulmonary/Chest: Effort normal and breath sounds normal. No respiratory distress.   Abdominal: Soft. Bowel sounds are normal. She exhibits no distension. There is no tenderness.   Neurological: She is alert and oriented to person, place, and time.   Skin: Skin is warm and dry.   Psychiatric: She has a normal mood and affect. Her behavior is normal.   Nursing note and vitals reviewed.      LABS  Results for orders placed or performed in visit on 08/23/18   POC Glycosylated Hemoglobin (Hb A1C)   Result Value Ref Range    Hemoglobin A1C 5.4 %     2/18 A1C 5.7, vit D 21    ASSESSMENT/PLAN  Problem List Items Addressed This Visit     Impaired fasting glucose - Primary     BG control stable/improved with A1C 5.4; encouraged reg phys activity to decr insulin resistance, moderation in unhealthy starches/sweets; f/u A1C in 6 mos           Relevant Orders    POC Glycosylated Hemoglobin (Hb A1C) (Completed)    Vitamin D deficiency     Needs updated vit D level on 1000 units QD         Scalp cyst     S/p successful open excision with Dr. Simons 5/23/18 - op note reviewed with patient int he office todady         Non-toxic multinodular goiter     Overdue for updated thyr u/s for serial examination of MNG; TFTs stable from 2/18         Relevant Orders    US Thyroid    Hypertension     BP bord/stable; on lisin 20mg QD; rec low Na diet, increased aerobic exercise; home BP monitoring with goal BP < 130/80                 FOLLOW-UP  1. Health maintenance - rec Shingrix, counseling done  2. RTC after 2/16/19 for next wellness; fasting labs the week prior to appt (CBC, CMP, TSH, lipids, UA/micro, vit D, FT4, microalb)      Electronically signed by:    Lisset Godwin MD  08/23/2018

## 2018-08-23 NOTE — ASSESSMENT & PLAN NOTE
BG control stable/improved with A1C 5.4; encouraged reg phys activity to decr insulin resistance, moderation in unhealthy starches/sweets; f/u A1C in 6 mos

## 2018-08-23 NOTE — ASSESSMENT & PLAN NOTE
S/p successful open excision with Dr. Simons 5/23/18 - op note reviewed with patient int he office best

## 2018-08-24 ENCOUNTER — LAB (OUTPATIENT)
Dept: INTERNAL MEDICINE | Facility: CLINIC | Age: 68
End: 2018-08-24

## 2018-08-24 DIAGNOSIS — E55.9 VITAMIN D DEFICIENCY: Primary | ICD-10-CM

## 2018-08-24 LAB — 25(OH)D3+25(OH)D2 SERPL-MCNC: 25.4 NG/ML

## 2018-08-26 NOTE — PROGRESS NOTES
Vit D improved but still borderline low at 25.4 (goal > 30); pls confirm she is taking 1000 units QD - if so, please incr to 2000 units QD

## 2018-08-27 ENCOUNTER — HOSPITAL ENCOUNTER (OUTPATIENT)
Dept: ULTRASOUND IMAGING | Facility: HOSPITAL | Age: 68
Discharge: HOME | End: 2018-08-27
Attending: INTERNAL MEDICINE | Admitting: INTERNAL MEDICINE

## 2018-08-27 DIAGNOSIS — E04.2 NON-TOXIC MULTINODULAR GOITER: ICD-10-CM

## 2018-08-27 PROCEDURE — 76536 US EXAM OF HEAD AND NECK: CPT

## 2018-08-28 NOTE — PROGRESS NOTES
Stable multinodular goiter, unchanged as compared to previous ultrasound in 2016; repeat in 1yr for cont'd surveillance

## 2018-09-19 ENCOUNTER — TELEPHONE (OUTPATIENT)
Dept: INTERNAL MEDICINE | Facility: CLINIC | Age: 68
End: 2018-09-19

## 2018-09-19 NOTE — TELEPHONE ENCOUNTER
Agree with waiting on flu shot until rash resolves    Recommend taking antihistamine (claritin, zyrtec, allegra) 1x/day and avoiding extreme heat.    If she thinks it is the body wash, obviously stop using it and rec a soap such as dove or ivory soap as these are least likely to cause allergic reactions

## 2018-09-19 NOTE — TELEPHONE ENCOUNTER
Pt came in today for a flu shot, pt reported a rash on her back and arms, per pt she came from dermatologist and was informed that the rash is most likely from her BP medication (she was prescribed by dermatologist to use triamcinolone acetonide cream), pt thinks it is from her new body wash, she wanted to know if it is still to go ahead and get the flu shot while she has this rash, asked Fariba, per Fariba advised pt that it is better not to get a flu shot right now and follow up with Dr. Godwin in regards to this, pls advise.

## 2018-09-27 ENCOUNTER — FLU SHOT (OUTPATIENT)
Dept: INTERNAL MEDICINE | Facility: CLINIC | Age: 68
End: 2018-09-27

## 2018-09-27 DIAGNOSIS — Z23 NEEDS FLU SHOT: Primary | ICD-10-CM

## 2018-09-27 PROCEDURE — G0008 ADMIN INFLUENZA VIRUS VAC: HCPCS | Performed by: INTERNAL MEDICINE

## 2018-09-27 PROCEDURE — 90662 IIV NO PRSV INCREASED AG IM: CPT | Performed by: INTERNAL MEDICINE

## 2018-12-22 DIAGNOSIS — I10 ESSENTIAL HYPERTENSION: ICD-10-CM

## 2018-12-22 RX ORDER — LISINOPRIL 20 MG/1
TABLET ORAL
Qty: 90 TABLET | Refills: 0 | Status: SHIPPED | OUTPATIENT
Start: 2018-12-22 | End: 2019-03-19 | Stop reason: SDUPTHER

## 2019-01-29 ENCOUNTER — OFFICE VISIT (OUTPATIENT)
Dept: INTERNAL MEDICINE | Facility: CLINIC | Age: 69
End: 2019-01-29

## 2019-01-29 VITALS
SYSTOLIC BLOOD PRESSURE: 148 MMHG | OXYGEN SATURATION: 99 % | WEIGHT: 220 LBS | BODY MASS INDEX: 33.34 KG/M2 | DIASTOLIC BLOOD PRESSURE: 78 MMHG | HEIGHT: 68 IN | HEART RATE: 66 BPM

## 2019-01-29 DIAGNOSIS — I10 ESSENTIAL HYPERTENSION: Primary | ICD-10-CM

## 2019-01-29 DIAGNOSIS — R73.01 IMPAIRED FASTING GLUCOSE: ICD-10-CM

## 2019-01-29 LAB — HBA1C MFR BLD: 5.5 %

## 2019-01-29 PROCEDURE — 99214 OFFICE O/P EST MOD 30 MIN: CPT | Performed by: INTERNAL MEDICINE

## 2019-01-29 PROCEDURE — 83036 HEMOGLOBIN GLYCOSYLATED A1C: CPT | Performed by: INTERNAL MEDICINE

## 2019-01-29 RX ORDER — AMLODIPINE BESYLATE 5 MG/1
5 TABLET ORAL DAILY
Qty: 30 TABLET | Refills: 1 | Status: SHIPPED | OUTPATIENT
Start: 2019-01-29 | End: 2019-03-26 | Stop reason: SDUPTHER

## 2019-01-29 NOTE — PROGRESS NOTES
"Chief Complaint   Patient presents with   • Impaired fasting glucose   • Hypertension       History of Present Illness  68 y.o.  woman  presents for BP and sugar follow-up.  Brings BP log averaging BP 150s/70s.   Worried that her BP machine is not reading accurately; worried that her cuff size is too small, which would contribute to falsely elevated readings. Denies CP, palpitations, SOB, headaches, visual changes, flushing.     Review of Systems  ROS (+) for elevated BPs without headaches, visual changes, CP, palpitations, SOB.  Notes baseline urinary frequency as well as nocturia, getting up about 2x/night to go to bathroom.    Notes jumping out of bed a few mornings ago to go to bathroom and hitting her right upper arm against her nightstand.  All other ROS reviewed and negative.    [facility]  The following portions of the patient's history were reviewed and updated as appropriate: allergies, current medications, past family history, past medical history, past social history, past surgical history and problem list.  SH: states of stress of  having MI in 10/18; planning to start going to [facility] together    Current Outpatient Medications:   •  aspirin 81 MG tablet, QD  •  CALCIUM 500 + D PO QD  •  Cholecalciferol (VITAMIN D3) 1000 UNITS QD  •  Coenzyme Q10 (COQ-10) 200 MG QD  •  lisinopril (PRINIVIL,ZESTRIL) 20 MG QD  •  Multiple Vitamin (MULTIVITAMINS PO), Qd    VITALS:  /78   Pulse 66   Ht 172.7 cm (68\")   Wt 99.8 kg (220 lb)   SpO2 99%   BMI 33.45 kg/m²   Repeat BP left arm 161/87 (her machine)  Manual left arm 160/80    Physical Exam   Constitutional: She is oriented to person, place, and time. She appears well-developed and well-nourished.   Eyes: Conjunctivae and EOM are normal.   Cardiovascular: Normal rate, regular rhythm and normal heart sounds.   Pulmonary/Chest: Effort normal and breath sounds normal. No respiratory distress.   Abdominal: Soft. Bowel sounds are normal. "   Neurological: She is alert and oriented to person, place, and time.   Skin:   2x4in ecchymosis right upper arm, greenish-brown   Psychiatric: She has a normal mood and affect. Her behavior is normal.   Nursing note and vitals reviewed.      LABS  Results for orders placed or performed in visit on 01/29/19   POC Glycosylated Hemoglobin (Hb A1C)   Result Value Ref Range    Hemoglobin A1C 5.5 %     8/18 A1C 5.4    ASSESSMENT/PLAN  Problem List Items Addressed This Visit     Impaired fasting glucose     Stable BG control with A1C 5.5; encouraged reg phys activity to decr insulin resistance, moderation in unhealthy starches/sweets; f/u A1C in 6 mos           Relevant Orders    POC Glycosylated Hemoglobin (Hb A1C) (Completed)    Hypertension - Primary     Verified accuracy of her BP cuff, noting cuff size is accurate; just filled RX for lisinopril 20mg QD; review med options, common side effects, risks; will proceed with adding amlodipine 5mg QD #30, 1RF and cont lisin 20mg QD; cont home monitoring every other day with goal < 130/80; encouraged low Na diet and healthy aerobic exercise - she has plans to start going to [facility]; f/u in 1 month with upcoming wellness visit and modify meds as needed (could always incr lisinopril if needed)         Relevant Medications    amLODIPine (NORVASC) 5 MG tablet          FOLLOW-UP  RTC for next wellness 2/26/19; fasting labs prior to appt (CBC, CMP, TSH, lipids, UA/micr, microalb, vit D)    Electronically signed by:    Lisset Godwin MD  01/29/2019

## 2019-01-30 NOTE — ASSESSMENT & PLAN NOTE
Verified accuracy of her BP cuff, noting cuff size is accurate; just filled RX for lisinopril 20mg QD; review med options, common side effects, risks; will proceed with adding amlodipine 5mg QD #30, 1RF and cont lisin 20mg QD; cont home monitoring every other day with goal < 130/80; encouraged low Na diet and healthy aerobic exercise - she has plans to start going to [employer]; f/u in 1 month with upcoming wellness visit and modify meds as needed (could always incr lisinopril if needed)

## 2019-01-30 NOTE — ASSESSMENT & PLAN NOTE
Stable BG control with A1C 5.5; encouraged reg phys activity to decr insulin resistance, moderation in unhealthy starches/sweets; f/u A1C in 6 mos

## 2019-02-11 ENCOUNTER — TELEPHONE (OUTPATIENT)
Dept: INTERNAL MEDICINE | Facility: CLINIC | Age: 69
End: 2019-02-11

## 2019-02-11 DIAGNOSIS — E55.9 VITAMIN D DEFICIENCY: ICD-10-CM

## 2019-02-11 DIAGNOSIS — Z00.00 MEDICARE ANNUAL WELLNESS VISIT, SUBSEQUENT: Primary | ICD-10-CM

## 2019-02-11 DIAGNOSIS — I10 ESSENTIAL HYPERTENSION: ICD-10-CM

## 2019-02-11 DIAGNOSIS — E78.2 MIXED HYPERLIPIDEMIA: ICD-10-CM

## 2019-02-11 DIAGNOSIS — E04.2 NON-TOXIC MULTINODULAR GOITER: ICD-10-CM

## 2019-02-11 DIAGNOSIS — R73.01 IMPAIRED FASTING GLUCOSE: ICD-10-CM

## 2019-03-19 DIAGNOSIS — I10 ESSENTIAL HYPERTENSION: ICD-10-CM

## 2019-03-19 RX ORDER — LISINOPRIL 20 MG/1
TABLET ORAL
Qty: 90 TABLET | Refills: 0 | Status: SHIPPED | OUTPATIENT
Start: 2019-03-19 | End: 2019-04-29 | Stop reason: SDUPTHER

## 2019-03-26 DIAGNOSIS — I10 ESSENTIAL HYPERTENSION: ICD-10-CM

## 2019-03-26 RX ORDER — AMLODIPINE BESYLATE 5 MG/1
TABLET ORAL
Qty: 30 TABLET | Refills: 0 | Status: SHIPPED | OUTPATIENT
Start: 2019-03-26 | End: 2019-04-22 | Stop reason: SDUPTHER

## 2019-04-22 DIAGNOSIS — I10 ESSENTIAL HYPERTENSION: ICD-10-CM

## 2019-04-22 RX ORDER — AMLODIPINE BESYLATE 5 MG/1
TABLET ORAL
Qty: 30 TABLET | Refills: 0 | Status: SHIPPED | OUTPATIENT
Start: 2019-04-22 | End: 2019-04-29 | Stop reason: SDUPTHER

## 2019-04-23 ENCOUNTER — LAB (OUTPATIENT)
Dept: INTERNAL MEDICINE | Facility: CLINIC | Age: 69
End: 2019-04-23

## 2019-04-23 DIAGNOSIS — E55.9 VITAMIN D DEFICIENCY: ICD-10-CM

## 2019-04-23 DIAGNOSIS — E78.2 MIXED HYPERLIPIDEMIA: ICD-10-CM

## 2019-04-23 DIAGNOSIS — Z00.00 MEDICARE ANNUAL WELLNESS VISIT, SUBSEQUENT: ICD-10-CM

## 2019-04-23 DIAGNOSIS — E04.2 NON-TOXIC MULTINODULAR GOITER: ICD-10-CM

## 2019-04-23 DIAGNOSIS — I10 ESSENTIAL HYPERTENSION: ICD-10-CM

## 2019-04-24 LAB
25(OH)D3+25(OH)D2 SERPL-MCNC: 33.3 NG/ML (ref 30–100)
ALBUMIN SERPL-MCNC: 3.9 G/DL (ref 3.5–5.2)
ALBUMIN/CREAT UR: <27.3 MG/G CREAT (ref 0–30)
ALBUMIN/GLOB SERPL: 1.4 G/DL
ALP SERPL-CCNC: 68 U/L (ref 39–117)
ALT SERPL-CCNC: 18 U/L (ref 1–33)
APPEARANCE UR: CLEAR
AST SERPL-CCNC: 15 U/L (ref 1–32)
BACTERIA #/AREA URNS HPF: NORMAL /HPF
BASOPHILS # BLD AUTO: 0.02 10*3/MM3 (ref 0–0.2)
BASOPHILS NFR BLD AUTO: 0.3 % (ref 0–1.5)
BILIRUB SERPL-MCNC: 0.6 MG/DL (ref 0.2–1.2)
BILIRUB UR QL STRIP: NEGATIVE
BUN SERPL-MCNC: 16 MG/DL (ref 8–23)
BUN/CREAT SERPL: 23.9 (ref 7–25)
CALCIUM SERPL-MCNC: 9.3 MG/DL (ref 8.6–10.5)
CASTS URNS MICRO: NORMAL
CHLORIDE SERPL-SCNC: 97 MMOL/L (ref 98–107)
CHOLEST SERPL-MCNC: 186 MG/DL (ref 0–200)
CO2 SERPL-SCNC: 26.7 MMOL/L (ref 22–29)
COLOR UR: YELLOW
CREAT SERPL-MCNC: 0.67 MG/DL (ref 0.57–1)
CREAT UR-MCNC: 11 MG/DL
EOSINOPHIL # BLD AUTO: 0.14 10*3/MM3 (ref 0–0.4)
EOSINOPHIL NFR BLD AUTO: 2.2 % (ref 0.3–6.2)
EPI CELLS #/AREA URNS HPF: NORMAL /HPF
ERYTHROCYTE [DISTWIDTH] IN BLOOD BY AUTOMATED COUNT: 13.7 % (ref 12.3–15.4)
GLOBULIN SER CALC-MCNC: 2.8 GM/DL
GLUCOSE SERPL-MCNC: 82 MG/DL (ref 65–99)
GLUCOSE UR QL: NEGATIVE
HCT VFR BLD AUTO: 43 % (ref 34–46.6)
HDLC SERPL-MCNC: 48 MG/DL (ref 40–60)
HGB BLD-MCNC: 13.3 G/DL (ref 12–15.9)
HGB UR QL STRIP: NEGATIVE
IMM GRANULOCYTES # BLD AUTO: 0.01 10*3/MM3 (ref 0–0.05)
IMM GRANULOCYTES NFR BLD AUTO: 0.2 % (ref 0–0.5)
KETONES UR QL STRIP: NEGATIVE
LDLC SERPL CALC-MCNC: 113 MG/DL (ref 0–100)
LEUKOCYTE ESTERASE UR QL STRIP: NEGATIVE
LYMPHOCYTES # BLD AUTO: 2.06 10*3/MM3 (ref 0.7–3.1)
LYMPHOCYTES NFR BLD AUTO: 33 % (ref 19.6–45.3)
MCH RBC QN AUTO: 30.1 PG (ref 26.6–33)
MCHC RBC AUTO-ENTMCNC: 30.9 G/DL (ref 31.5–35.7)
MCV RBC AUTO: 97.3 FL (ref 79–97)
MICROALBUMIN UR-MCNC: <3 UG/ML
MONOCYTES # BLD AUTO: 0.42 10*3/MM3 (ref 0.1–0.9)
MONOCYTES NFR BLD AUTO: 6.7 % (ref 5–12)
NEUTROPHILS # BLD AUTO: 3.6 10*3/MM3 (ref 1.7–7)
NEUTROPHILS NFR BLD AUTO: 57.6 % (ref 42.7–76)
NITRITE UR QL STRIP: NEGATIVE
NRBC BLD AUTO-RTO: 0 /100 WBC (ref 0–0.2)
PH UR STRIP: 7 [PH] (ref 5–8)
PLATELET # BLD AUTO: 197 10*3/MM3 (ref 140–450)
POTASSIUM SERPL-SCNC: 4.8 MMOL/L (ref 3.5–5.2)
PROT SERPL-MCNC: 6.7 G/DL (ref 6–8.5)
PROT UR QL STRIP: NEGATIVE
RBC # BLD AUTO: 4.42 10*6/MM3 (ref 3.77–5.28)
RBC #/AREA URNS HPF: NORMAL /HPF
SODIUM SERPL-SCNC: 135 MMOL/L (ref 136–145)
SP GR UR: 1.01 (ref 1–1.03)
TRIGL SERPL-MCNC: 127 MG/DL (ref 0–150)
TSH SERPL DL<=0.005 MIU/L-ACNC: 2.63 UIU/ML (ref 0.45–4.5)
UROBILINOGEN UR STRIP-MCNC: (no result) MG/DL
VLDLC SERPL CALC-MCNC: 25.4 MG/DL (ref 5–40)
WBC # BLD AUTO: 6.25 10*3/MM3 (ref 3.4–10.8)
WBC #/AREA URNS HPF: NORMAL /HPF

## 2019-04-29 PROBLEM — Z78.0 MENOPAUSE: Status: ACTIVE | Noted: 2019-04-29

## 2019-04-30 ENCOUNTER — OFFICE VISIT (OUTPATIENT)
Dept: INTERNAL MEDICINE | Facility: CLINIC | Age: 69
End: 2019-04-30

## 2019-04-30 VITALS
DIASTOLIC BLOOD PRESSURE: 78 MMHG | HEART RATE: 83 BPM | HEIGHT: 68 IN | OXYGEN SATURATION: 99 % | TEMPERATURE: 98.1 F | SYSTOLIC BLOOD PRESSURE: 138 MMHG | BODY MASS INDEX: 33.34 KG/M2 | WEIGHT: 220 LBS

## 2019-04-30 DIAGNOSIS — Z78.0 MENOPAUSE: ICD-10-CM

## 2019-04-30 DIAGNOSIS — M85.89 OSTEOPENIA OF MULTIPLE SITES: ICD-10-CM

## 2019-04-30 DIAGNOSIS — E78.2 MIXED HYPERLIPIDEMIA: ICD-10-CM

## 2019-04-30 DIAGNOSIS — I10 ESSENTIAL HYPERTENSION: ICD-10-CM

## 2019-04-30 DIAGNOSIS — I65.23 ATHEROSCLEROSIS OF BOTH CAROTID ARTERIES: ICD-10-CM

## 2019-04-30 DIAGNOSIS — E55.9 VITAMIN D DEFICIENCY: ICD-10-CM

## 2019-04-30 DIAGNOSIS — E04.2 NON-TOXIC MULTINODULAR GOITER: ICD-10-CM

## 2019-04-30 DIAGNOSIS — Z00.00 MEDICARE ANNUAL WELLNESS VISIT, SUBSEQUENT: Primary | ICD-10-CM

## 2019-04-30 PROCEDURE — 93000 ELECTROCARDIOGRAM COMPLETE: CPT | Performed by: INTERNAL MEDICINE

## 2019-04-30 PROCEDURE — G0439 PPPS, SUBSEQ VISIT: HCPCS | Performed by: INTERNAL MEDICINE

## 2019-04-30 PROCEDURE — 96160 PT-FOCUSED HLTH RISK ASSMT: CPT | Performed by: INTERNAL MEDICINE

## 2019-04-30 PROCEDURE — G0444 DEPRESSION SCREEN ANNUAL: HCPCS | Performed by: INTERNAL MEDICINE

## 2019-04-30 PROCEDURE — 99397 PER PM REEVAL EST PAT 65+ YR: CPT | Performed by: INTERNAL MEDICINE

## 2019-04-30 RX ORDER — AMLODIPINE BESYLATE 5 MG/1
5 TABLET ORAL DAILY
Qty: 90 TABLET | Refills: 3 | Status: SHIPPED | OUTPATIENT
Start: 2019-04-30 | End: 2020-05-17 | Stop reason: SDUPTHER

## 2019-04-30 RX ORDER — LISINOPRIL 20 MG/1
20 TABLET ORAL DAILY
Qty: 90 TABLET | Refills: 3 | Status: SHIPPED | OUTPATIENT
Start: 2019-04-30 | End: 2020-05-17 | Stop reason: SDUPTHER

## 2019-04-30 RX ORDER — MULTIVIT-MIN/IRON/FOLIC ACID/K 18-600-40
CAPSULE ORAL DAILY
COMMUNITY

## 2019-05-01 ENCOUNTER — TELEPHONE (OUTPATIENT)
Dept: INTERNAL MEDICINE | Facility: CLINIC | Age: 69
End: 2019-05-01

## 2019-05-02 ENCOUNTER — APPOINTMENT (OUTPATIENT)
Dept: BONE DENSITY | Facility: HOSPITAL | Age: 69
End: 2019-05-02

## 2019-05-08 ENCOUNTER — HOSPITAL ENCOUNTER (OUTPATIENT)
Dept: BONE DENSITY | Facility: HOSPITAL | Age: 69
Discharge: HOME | End: 2019-05-08
Admitting: INTERNAL MEDICINE

## 2019-05-08 DIAGNOSIS — Z78.0 MENOPAUSE: ICD-10-CM

## 2019-05-08 PROCEDURE — 77080 DXA BONE DENSITY AXIAL: CPT

## 2019-05-10 NOTE — PROGRESS NOTES
Dear Ms. Amador,    Thank you for obtaining your DEXA (bone density) scan.  I have received those results and would like to review them with you.      Your bone density remains in the osteopenic range.  This is between normal and osteoporosis.  It is mildly worsened at the hip when compared to your last DEXA in 2016.    The values indicate that your risk of a major fracture in the next 10 years is 10%.    Please maintain regular calcium and vitamin D supplementation.  Calcium intake should be 1000mg per day between diet and supplements.  Weight bearing exercise is good for bone health as well.    We should plan to repeat your DEXA in 2-3 years.  Please let me know if you have any questions or concerns regarding these results.        Sincerely,  Lisset Godwin MD

## 2019-08-20 ENCOUNTER — TELEPHONE (OUTPATIENT)
Dept: INTERNAL MEDICINE | Facility: CLINIC | Age: 69
End: 2019-08-20

## 2019-08-20 NOTE — TELEPHONE ENCOUNTER
PATIENT IS HAVING SOME KNEE PAIN. SHE IS CALLING TO SEE IF IT DOES NOT IMPROVE WHO DOES DR. BRASWELL RECOMMEND PATIENT TO SEE ABOUT KNEE ISSUES. PATIENTS NUMBER [phone]

## 2019-08-20 NOTE — TELEPHONE ENCOUNTER
Pt advised that she would need to come in for an appointment to be evaluated by Dr. Godwin and for a referral, pt made an appointment for Thursday at 9:30 am.

## 2019-08-21 PROBLEM — I83.029 VENOUS STASIS ULCER OF LEFT LOWER EXTREMITY: Status: RESOLVED | Noted: 2017-01-03 | Resolved: 2019-08-21

## 2019-08-21 PROBLEM — L97.929 VENOUS STASIS ULCER OF LEFT LOWER EXTREMITY (HCC): Status: RESOLVED | Noted: 2017-01-03 | Resolved: 2019-08-21

## 2019-08-22 ENCOUNTER — OFFICE VISIT (OUTPATIENT)
Dept: INTERNAL MEDICINE | Facility: CLINIC | Age: 69
End: 2019-08-22

## 2019-08-22 VITALS
BODY MASS INDEX: 32.74 KG/M2 | WEIGHT: 216 LBS | SYSTOLIC BLOOD PRESSURE: 124 MMHG | HEIGHT: 68 IN | OXYGEN SATURATION: 95 % | DIASTOLIC BLOOD PRESSURE: 62 MMHG | HEART RATE: 70 BPM

## 2019-08-22 DIAGNOSIS — I10 ESSENTIAL HYPERTENSION: ICD-10-CM

## 2019-08-22 DIAGNOSIS — M25.561 ACUTE PAIN OF RIGHT KNEE: Primary | ICD-10-CM

## 2019-08-22 PROCEDURE — 99213 OFFICE O/P EST LOW 20 MIN: CPT | Performed by: INTERNAL MEDICINE

## 2019-08-22 NOTE — PROGRESS NOTES
"Chief Complaint   Patient presents with   • Knee Pain       History of Present Illness  69 y.o.  woman presents for further evaluation of right knee pain.  HPI started about 1 month ago.  Thinks she bumped it (flexed) too much when trying to get in her vehicle as the vehicle next to her was too close.  Ever since then has had on and off painful on the inside \"it comes and goes with some swelling.  She is usually asymptomatic in the morning but as she is active, the pain worsens.  Has tried 2 Advil at a time which seemed not very helpful.  Has not had any redness.  Has not had a fall.  Left knee is asymptomatic.  Has been trying to use a sleeve.  Also having some pain arising from seated position, such as getting off of toilet.    Review of Systems  ROS (+) for right knee pain across the front and behind, worsened with weight-bearing; asx in the morning as well as when sitting.  No radiation of pain, no numbness/tingling.  Notes minimal assoc'd right knee swelling, no redness. All other ROS reviewed and negative.    [facility]  The following portions of the patient's history were reviewed and updated as appropriate: allergies, current medications, past family history, past medical history, past social history, past surgical history and problem list.    Current Outpatient Medications:   •  amLODIPine (NORVASC) 5 MG tablet, QD  •  aspirin 81 MG tablet, QD  •  Cholecalciferol (VITAMIN D) 2000 units capsule, QD  •  Coenzyme Q10 (COQ-10) 200 MG capsule, QD  •  lisinopril (PRINIVIL,ZESTRIL) 20 MG tablet,QD  •  Multiple Vitamin (MULTIVITAMINS PO), QD    VITALS:  /62   Pulse 70   Ht 172.7 cm (68\")   Wt 98 kg (216 lb)   SpO2 95%   BMI 32.84 kg/m²     Physical Exam   Constitutional: She is oriented to person, place, and time. She appears well-developed and well-nourished. No distress.   Pulmonary/Chest: Effort normal. No respiratory distress.   Musculoskeletal: She exhibits no edema (BLE).   bilat knees with " arthritic changes, symm without erythema or increased warmth; right knee across the front bilaterally with discomfort with extreme extension, inversion, and eversion; asx with extreme flexion; neg Mc Torres's test; has to push off to stand up from chair   Neurological: She is alert and oriented to person, place, and time. No sensory deficit.   Skin: Skin is warm and dry. No rash noted.   Psychiatric: She has a normal mood and affect. Her behavior is normal.   Nursing note and vitals reviewed.      ASSESSMENT/PLAN  Problem List Items Addressed This Visit     Hypertension     BP at goal 124/62 on amlo 5mg QD and lisin 20mg QD           Other Visit Diagnoses     Acute pain of right knee    -  Primary    x1 month, no falls; cont alt heat/ice prn and elev; incr ibu to 3-4 tid prn w/ food, alt w/ tyl prn; proceed with PT (order for KORT); f/u prn       - also discussed importance of HEP for maintenance; f/u if no0 better and will consider MRI if not improved with PT; fall precautions    FOLLOW-UP  1. Health maintenance - flu vacc in upcoming months; Shingrix done; rec updated Td (Tdap 6/09) and HAV series of 2 (pt cites cost as barrier)  2. Has thyroid u/s pending 9/4/10  3. RTC prn; next wellness scheduled 5/4/20; fasting labs the week prior to appt (CBC, CMP, TSH, lipids, UA/micro, microalb, A1C, vit D)      Electronically signed by:    iLsset Godwin MD  08/22/2019

## 2019-09-04 ENCOUNTER — HOSPITAL ENCOUNTER (OUTPATIENT)
Dept: ULTRASOUND IMAGING | Facility: HOSPITAL | Age: 69
Discharge: HOME | End: 2019-09-04
Admitting: INTERNAL MEDICINE

## 2019-09-04 DIAGNOSIS — E04.2 NON-TOXIC MULTINODULAR GOITER: ICD-10-CM

## 2019-09-04 PROCEDURE — 76536 US EXAM OF HEAD AND NECK: CPT

## 2019-10-08 ENCOUNTER — FLU SHOT (OUTPATIENT)
Dept: INTERNAL MEDICINE | Facility: CLINIC | Age: 69
End: 2019-10-08

## 2019-10-08 DIAGNOSIS — Z23 NEED FOR VACCINATION: ICD-10-CM

## 2019-10-08 PROCEDURE — 90653 IIV ADJUVANT VACCINE IM: CPT | Performed by: INTERNAL MEDICINE

## 2019-10-08 PROCEDURE — G0008 ADMIN INFLUENZA VIRUS VAC: HCPCS | Performed by: INTERNAL MEDICINE

## 2019-10-10 ENCOUNTER — TELEPHONE (OUTPATIENT)
Dept: INTERNAL MEDICINE | Facility: CLINIC | Age: 69
End: 2019-10-10

## 2019-10-10 NOTE — TELEPHONE ENCOUNTER
Patient got her flu shot the other day and wants to know which high dose she received? The quadrivalent or trivalent? Please give pt a call. Thanks.

## 2020-05-15 DIAGNOSIS — I10 ESSENTIAL HYPERTENSION: ICD-10-CM

## 2020-05-15 RX ORDER — AMLODIPINE BESYLATE 5 MG/1
TABLET ORAL
Qty: 90 TABLET | Refills: 2 | OUTPATIENT
Start: 2020-05-15

## 2020-05-17 NOTE — ASSESSMENT & PLAN NOTE
BG control stable with A1C 5.6; encouraged reg phys activity to decr insulin resistance, moderation in unhealthy starches/sweets; f/u A1C in 3 mos with next wellness

## 2020-05-17 NOTE — ASSESSMENT & PLAN NOTE
BP remains stable on amlo 5mg QD and lisin 20mg QD, both #90, 0RF; needs electrolytes for drug monitoring

## 2020-05-19 ENCOUNTER — OFFICE VISIT (OUTPATIENT)
Dept: INTERNAL MEDICINE | Facility: CLINIC | Age: 70
End: 2020-05-19

## 2020-05-19 VITALS
OXYGEN SATURATION: 98 % | SYSTOLIC BLOOD PRESSURE: 118 MMHG | WEIGHT: 215.6 LBS | DIASTOLIC BLOOD PRESSURE: 76 MMHG | HEART RATE: 72 BPM | BODY MASS INDEX: 32.78 KG/M2

## 2020-05-19 DIAGNOSIS — R73.01 IMPAIRED FASTING GLUCOSE: ICD-10-CM

## 2020-05-19 DIAGNOSIS — I10 ESSENTIAL HYPERTENSION: Primary | ICD-10-CM

## 2020-05-19 LAB — HBA1C MFR BLD: 5.6 %

## 2020-05-19 PROCEDURE — 99213 OFFICE O/P EST LOW 20 MIN: CPT | Performed by: INTERNAL MEDICINE

## 2020-05-19 PROCEDURE — 83036 HEMOGLOBIN GLYCOSYLATED A1C: CPT | Performed by: INTERNAL MEDICINE

## 2020-05-19 PROCEDURE — 90471 IMMUNIZATION ADMIN: CPT | Performed by: INTERNAL MEDICINE

## 2020-05-19 PROCEDURE — 90714 TD VACC NO PRESV 7 YRS+ IM: CPT | Performed by: INTERNAL MEDICINE

## 2020-05-19 RX ORDER — AMLODIPINE BESYLATE 5 MG/1
5 TABLET ORAL DAILY
Qty: 90 TABLET | Refills: 0 | Status: SHIPPED | OUTPATIENT
Start: 2020-05-19 | End: 2020-08-10 | Stop reason: SDUPTHER

## 2020-05-19 RX ORDER — LISINOPRIL 20 MG/1
20 TABLET ORAL DAILY
Qty: 90 TABLET | Refills: 0 | Status: SHIPPED | OUTPATIENT
Start: 2020-05-19 | End: 2020-08-10 | Stop reason: SDUPTHER

## 2020-05-19 NOTE — PROGRESS NOTES
Chief Complaint   Patient presents with   • Hypertension       History of Present Illness  70 y.o.  woman presents for BP follow-up. Needs med refills. Has been taking walks outside. Requesting copies of her DEXA and thyr u/s from last year.    Review of Systems  Denies headaches, CP, palpitations, SOB, falls. All other ROS reviewed and negative.    [facility]  The following portions of the patient's history were reviewed and updated as appropriate: allergies, current medications, past family history, past medical history, past social history, past surgical history and problem list.    Current Outpatient Medications:   •  amLODIPine (NORVASC) 5 MG QD  •  aspirin 81 MG QD  •  Cholecalciferol (VITAMIN D) 2000 units QD  •  Coenzyme Q10 (COQ-10) 200 MG QD  •  lisinopril (PRINIVIL,ZESTRIL) 20 MG QD  •  Multiple Vitamin (MULTIVITAMINS PO), QD      VITALS:  /76   Pulse 72   Wt 97.8 kg (215 lb 9.6 oz)   SpO2 98%   Breastfeeding No   BMI 32.78 kg/m²     Physical Exam   Constitutional: She is oriented to person, place, and time. She appears well-developed and well-nourished.   Eyes: Conjunctivae and EOM are normal.   Cardiovascular: Normal rate, regular rhythm and normal heart sounds.   Pulmonary/Chest: Effort normal and breath sounds normal. No respiratory distress.   Abdominal: Soft. Bowel sounds are normal.   Musculoskeletal:   Normal gait   Neurological: She is alert and oriented to person, place, and time.   Psychiatric: She has a normal mood and affect. Her behavior is normal.   Nursing note and vitals reviewed.      LABS  Results for orders placed or performed in visit on 05/19/20   POC Glycosylated Hemoglobin (Hb A1C)   Result Value Ref Range    Hemoglobin A1C 5.6 %     1/19 A1C 5.5    ASSESSMENT/PLAN  Problem List Items Addressed This Visit        Cardiovascular and Mediastinum    Hypertension - Primary     BP remains stable on amlo 5mg QD and lisin 20mg QD, both #90, 0RF; needs electrolytes for drug  monitoring         Relevant Medications    lisinopril (PRINIVIL,ZESTRIL) 20 MG tablet    amLODIPine (NORVASC) 5 MG tablet       Endocrine    Impaired fasting glucose     BG control stable with A1C 5.6; encouraged reg phys activity to decr insulin resistance, moderation in unhealthy starches/sweets; f/u A1C in 3 mos with next wellness         Relevant Medications    lisinopril (PRINIVIL,ZESTRIL) 20 MG tablet    amLODIPine (NORVASC) 5 MG tablet    Other Relevant Orders    POC Glycosylated Hemoglobin (Hb A1C) (Completed)        .Advance Care Planning   ACP discussion was held with the patient during this visit. Patient does not have an advance directive, information provided.  Advance Care Planning Discussion:  15 min counseling done; patient does not have advanced directive and living will.  Reviewed desires for end of life care, which is to have comfort care.  Patient does not want extraordinary life-sustaining measures, including no chest compression, shocks, intubation/ventilator use, feeding tube, or prolonged artificial life support.  Reviewed code status options, meanings, desires. Patient 's code status is uncertain.   Encouraged patient to ensure family is aware of desires/preferences. She is frustrated that her  does not want to talk about these matters and get the documentation completed. She will call us back if she wants assistance from the ACP team.    FOLLOW-UP  1. Copies of thyr u/s and last DEXA provided to patient as requested  2. Health maintenance- Td given today; rec HAV vaccine  3. RTC for next [facility] wellness 8/11/20; fasting labs prior to appt (CBC, CMP, TSH, lipids, UA/micr, microalb, vit D, A1C)    Electronically signed by:    Lisset Godwin MD, FACP  05/19/2020

## 2020-06-25 DIAGNOSIS — I10 ESSENTIAL HYPERTENSION: ICD-10-CM

## 2020-06-25 DIAGNOSIS — R73.01 IMPAIRED FASTING GLUCOSE: ICD-10-CM

## 2020-06-25 RX ORDER — LISINOPRIL 20 MG/1
TABLET ORAL
Qty: 90 TABLET | Refills: 2 | OUTPATIENT
Start: 2020-06-25

## 2020-08-10 ENCOUNTER — TELEPHONE (OUTPATIENT)
Dept: INTERNAL MEDICINE | Facility: CLINIC | Age: 70
End: 2020-08-10

## 2020-08-10 DIAGNOSIS — I10 ESSENTIAL HYPERTENSION: ICD-10-CM

## 2020-08-10 DIAGNOSIS — Z00.00 MEDICARE ANNUAL WELLNESS VISIT, SUBSEQUENT: Primary | ICD-10-CM

## 2020-08-10 DIAGNOSIS — E55.9 VITAMIN D DEFICIENCY: ICD-10-CM

## 2020-08-10 DIAGNOSIS — E78.2 MIXED HYPERLIPIDEMIA: ICD-10-CM

## 2020-08-10 DIAGNOSIS — R73.01 IMPAIRED FASTING GLUCOSE: ICD-10-CM

## 2020-08-10 NOTE — ASSESSMENT & PLAN NOTE
BP stable on lisin 20mg QD #90, 3RF and amlo 5mg QD #90, 3RF; needs electrolytes for drug monitoring
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
## 2020-08-11 ENCOUNTER — OFFICE VISIT (OUTPATIENT)
Dept: INTERNAL MEDICINE | Facility: CLINIC | Age: 70
End: 2020-08-11

## 2020-08-11 ENCOUNTER — RESULTS ENCOUNTER (OUTPATIENT)
Dept: INTERNAL MEDICINE | Facility: CLINIC | Age: 70
End: 2020-08-11

## 2020-08-11 VITALS
HEART RATE: 76 BPM | OXYGEN SATURATION: 98 % | HEIGHT: 68 IN | BODY MASS INDEX: 33.19 KG/M2 | WEIGHT: 219 LBS | DIASTOLIC BLOOD PRESSURE: 78 MMHG | SYSTOLIC BLOOD PRESSURE: 124 MMHG

## 2020-08-11 DIAGNOSIS — E78.2 MIXED HYPERLIPIDEMIA: ICD-10-CM

## 2020-08-11 DIAGNOSIS — E55.9 VITAMIN D DEFICIENCY: ICD-10-CM

## 2020-08-11 DIAGNOSIS — Z00.00 MEDICARE ANNUAL WELLNESS VISIT, SUBSEQUENT: Primary | ICD-10-CM

## 2020-08-11 DIAGNOSIS — I10 ESSENTIAL HYPERTENSION: ICD-10-CM

## 2020-08-11 DIAGNOSIS — R73.01 IMPAIRED FASTING GLUCOSE: ICD-10-CM

## 2020-08-11 DIAGNOSIS — M85.89 OSTEOPENIA OF MULTIPLE SITES: ICD-10-CM

## 2020-08-11 DIAGNOSIS — R00.2 PALPITATIONS: ICD-10-CM

## 2020-08-11 DIAGNOSIS — I65.23 ATHEROSCLEROSIS OF BOTH CAROTID ARTERIES: ICD-10-CM

## 2020-08-11 PROCEDURE — 96160 PT-FOCUSED HLTH RISK ASSMT: CPT | Performed by: INTERNAL MEDICINE

## 2020-08-11 PROCEDURE — 93000 ELECTROCARDIOGRAM COMPLETE: CPT | Performed by: INTERNAL MEDICINE

## 2020-08-11 PROCEDURE — 99397 PER PM REEVAL EST PAT 65+ YR: CPT | Performed by: INTERNAL MEDICINE

## 2020-08-11 PROCEDURE — G0444 DEPRESSION SCREEN ANNUAL: HCPCS | Performed by: INTERNAL MEDICINE

## 2020-08-11 PROCEDURE — 99497 ADVNCD CARE PLAN 30 MIN: CPT | Performed by: INTERNAL MEDICINE

## 2020-08-11 PROCEDURE — G0439 PPPS, SUBSEQ VISIT: HCPCS | Performed by: INTERNAL MEDICINE

## 2020-08-11 RX ORDER — AMLODIPINE BESYLATE 5 MG/1
5 TABLET ORAL DAILY
Qty: 90 TABLET | Refills: 3 | Status: SHIPPED | OUTPATIENT
Start: 2020-08-11 | End: 2020-08-17

## 2020-08-11 RX ORDER — LISINOPRIL 20 MG/1
20 TABLET ORAL DAILY
Qty: 90 TABLET | Refills: 3 | Status: SHIPPED | OUTPATIENT
Start: 2020-08-11 | End: 2021-08-19 | Stop reason: SDUPTHER
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
## 2020-08-12 NOTE — ASSESSMENT & PLAN NOTE
PVCs on EKG; rare symptoms without associated symptoms per patient; check electrolytes and TSH for routine monitoring; patient states that she does drink enough water on a daily basis but her physical activity is limited

## 2020-08-13 ENCOUNTER — LAB REQUISITION (OUTPATIENT)
Dept: LAB | Facility: HOSPITAL | Age: 70
End: 2020-08-13

## 2020-08-13 DIAGNOSIS — Z00.00 ROUTINE GENERAL MEDICAL EXAMINATION AT A HEALTH CARE FACILITY: ICD-10-CM

## 2020-08-13 LAB
ALBUMIN/CREAT UR: <11 MG/G CREAT (ref 0–29)
APPEARANCE UR: CLEAR
BACTERIA #/AREA URNS HPF: NORMAL /HPF
BILIRUB UR QL STRIP: NEGATIVE
CASTS URNS MICRO: NORMAL
COLOR UR: YELLOW
CREAT UR-MCNC: 27.9 MG/DL
EPI CELLS #/AREA URNS HPF: NORMAL /HPF
GLUCOSE UR QL: NEGATIVE
HGB UR QL STRIP: NEGATIVE
KETONES UR QL STRIP: NEGATIVE
LEUKOCYTE ESTERASE UR QL STRIP: NEGATIVE
MICROALBUMIN UR-MCNC: <3 UG/ML
NITRITE UR QL STRIP: NEGATIVE
PH UR STRIP: 5.5 [PH] (ref 5–8)
PROT UR QL STRIP: NEGATIVE
RBC #/AREA URNS HPF: NORMAL /HPF
SP GR UR: 1.01 (ref 1–1.03)
UROBILINOGEN UR STRIP-MCNC: NORMAL MG/DL
WBC #/AREA URNS HPF: NORMAL /HPF

## 2020-08-14 LAB
25(OH)D3+25(OH)D2 SERPL-MCNC: 44.5 NG/ML (ref 30–100)
ALBUMIN SERPL-MCNC: 4.4 G/DL (ref 3.5–5.2)
ALBUMIN/CREAT UR: <26 MG/G CREAT (ref 0–29)
ALBUMIN/GLOB SERPL: 2.2 G/DL
ALP SERPL-CCNC: 57 U/L (ref 39–117)
ALT SERPL-CCNC: 12 U/L (ref 1–33)
APPEARANCE UR: CLEAR
AST SERPL-CCNC: 14 U/L (ref 1–32)
BACTERIA #/AREA URNS HPF: NORMAL /HPF
BASOPHILS # BLD AUTO: 0.02 10*3/MM3 (ref 0–0.2)
BASOPHILS NFR BLD AUTO: 0.3 % (ref 0–1.5)
BILIRUB SERPL-MCNC: 0.5 MG/DL (ref 0–1.2)
BILIRUB UR QL STRIP: NEGATIVE
BUN SERPL-MCNC: 16 MG/DL (ref 8–23)
BUN/CREAT SERPL: 22.9 (ref 7–25)
CALCIUM SERPL-MCNC: 8.7 MG/DL (ref 8.6–10.5)
CASTS URNS MICRO: NORMAL
CHLORIDE SERPL-SCNC: 100 MMOL/L (ref 98–107)
CHOLEST SERPL-MCNC: 191 MG/DL (ref 0–200)
CO2 SERPL-SCNC: 24.8 MMOL/L (ref 22–29)
COLOR UR: YELLOW
CREAT SERPL-MCNC: 0.7 MG/DL (ref 0.57–1)
CREAT UR-MCNC: 11.7 MG/DL
EOSINOPHIL # BLD AUTO: 0.12 10*3/MM3 (ref 0–0.4)
EOSINOPHIL NFR BLD AUTO: 1.9 % (ref 0.3–6.2)
EPI CELLS #/AREA URNS HPF: NORMAL /HPF
ERYTHROCYTE [DISTWIDTH] IN BLOOD BY AUTOMATED COUNT: 13.1 % (ref 12.3–15.4)
GLOBULIN SER CALC-MCNC: 2 GM/DL
GLUCOSE SERPL-MCNC: 89 MG/DL (ref 65–99)
GLUCOSE UR QL: NEGATIVE
HBA1C MFR BLD: 5.7 % (ref 4.8–5.6)
HCT VFR BLD AUTO: 43.1 % (ref 34–46.6)
HDLC SERPL-MCNC: 51 MG/DL (ref 40–60)
HGB BLD-MCNC: 14.2 G/DL (ref 12–15.9)
HGB UR QL STRIP: NEGATIVE
IMM GRANULOCYTES # BLD AUTO: 0.01 10*3/MM3 (ref 0–0.05)
IMM GRANULOCYTES NFR BLD AUTO: 0.2 % (ref 0–0.5)
KETONES UR QL STRIP: NEGATIVE
LDLC SERPL CALC-MCNC: 115 MG/DL (ref 0–100)
LEUKOCYTE ESTERASE UR QL STRIP: NEGATIVE
LYMPHOCYTES # BLD AUTO: 1.99 10*3/MM3 (ref 0.7–3.1)
LYMPHOCYTES NFR BLD AUTO: 31.8 % (ref 19.6–45.3)
MCH RBC QN AUTO: 30.5 PG (ref 26.6–33)
MCHC RBC AUTO-ENTMCNC: 32.9 G/DL (ref 31.5–35.7)
MCV RBC AUTO: 92.7 FL (ref 79–97)
MICROALBUMIN UR-MCNC: <3 UG/ML
MONOCYTES # BLD AUTO: 0.38 10*3/MM3 (ref 0.1–0.9)
MONOCYTES NFR BLD AUTO: 6.1 % (ref 5–12)
NEUTROPHILS # BLD AUTO: 3.73 10*3/MM3 (ref 1.7–7)
NEUTROPHILS NFR BLD AUTO: 59.7 % (ref 42.7–76)
NITRITE UR QL STRIP: NEGATIVE
NRBC BLD AUTO-RTO: 0 /100 WBC (ref 0–0.2)
PH UR STRIP: 7 [PH] (ref 5–8)
PLATELET # BLD AUTO: 208 10*3/MM3 (ref 140–450)
POTASSIUM SERPL-SCNC: 4.1 MMOL/L (ref 3.5–5.2)
PROT SERPL-MCNC: 6.4 G/DL (ref 6–8.5)
PROT UR QL STRIP: NEGATIVE
RBC # BLD AUTO: 4.65 10*6/MM3 (ref 3.77–5.28)
RBC #/AREA URNS HPF: NORMAL /HPF
SODIUM SERPL-SCNC: 135 MMOL/L (ref 136–145)
SP GR UR: NORMAL (ref 1–1.03)
TRIGL SERPL-MCNC: 127 MG/DL (ref 0–150)
TSH SERPL DL<=0.005 MIU/L-ACNC: 2.81 UIU/ML (ref 0.27–4.2)
UROBILINOGEN UR STRIP-MCNC: NORMAL MG/DL
VLDLC SERPL CALC-MCNC: 25.4 MG/DL
WBC # BLD AUTO: 6.25 10*3/MM3 (ref 3.4–10.8)
WBC #/AREA URNS HPF: NORMAL /HPF

## 2020-08-15 DIAGNOSIS — R73.01 IMPAIRED FASTING GLUCOSE: ICD-10-CM

## 2020-08-15 DIAGNOSIS — I10 ESSENTIAL HYPERTENSION: ICD-10-CM

## 2020-08-15 NOTE — PROGRESS NOTES
"Dear Ms. Amador,    Thank you for obtaining the laboratories that we ordered.  I have received those results and would like to review them with you.    Your blood counts, thyroid test, electrolytes, vitamin D, and urine tests are within normal limits.  This indicates no anemia, as well as normal thyroid, liver, and kidney function. Your hemoglobin A1C, the 3-month average of sugars, remains stable at 5.7 (goal < 6.5, ideally < 6.0). This means you remain stable in the \"pre-diabetes\" range. With these results, please continue your current dose of vitamin D supplement.    Your cholesterol profile is stable, showing a total cholesterol of 191 (goal < 200).  Your triglycerides are acceptable at 127 with a goal < 150.  Your HDL, the good cholesterol, is 51.  HDL is heart protective, and the goal is > 50 in women.  Your LDL, the bad cholesterol, is 115.  Goal for LDL is < 130, ideally < 100.    Overall your labs are stable.  Let me know if you have any questions or concerns regarding these results.      Sincerely,  Lisset Godwin MD, FACP"

## 2020-08-17 RX ORDER — AMLODIPINE BESYLATE 5 MG/1
TABLET ORAL
Qty: 30 TABLET | Refills: 0 | Status: SHIPPED | OUTPATIENT
Start: 2020-08-17 | End: 2021-08-19 | Stop reason: SDUPTHER

## 2020-09-10 ENCOUNTER — HOSPITAL ENCOUNTER (OUTPATIENT)
Dept: CARDIOLOGY | Facility: HOSPITAL | Age: 70
Discharge: HOME | End: 2020-09-10
Admitting: INTERNAL MEDICINE

## 2020-09-10 DIAGNOSIS — I65.23 ATHEROSCLEROSIS OF BOTH CAROTID ARTERIES: ICD-10-CM

## 2020-09-10 LAB
BH CV XLRA MEAS LEFT CCA RATIO VEL: 67.8 CM/SEC
BH CV XLRA MEAS LEFT DIST CCA EDV: 18.2 CM/SEC
BH CV XLRA MEAS LEFT DIST CCA PSV: 67.8 CM/SEC
BH CV XLRA MEAS LEFT DIST ICA EDV: 27.1 CM/SEC
BH CV XLRA MEAS LEFT DIST ICA PSV: 114 CM/SEC
BH CV XLRA MEAS LEFT ICA RATIO VEL: 72.2 CM/SEC
BH CV XLRA MEAS LEFT ICA/CCA RATIO: 1.1
BH CV XLRA MEAS LEFT MID CCA EDV: 21.6 CM/SEC
BH CV XLRA MEAS LEFT MID CCA PSV: 94.3 CM/SEC
BH CV XLRA MEAS LEFT MID ICA EDV: 23.1 CM/SEC
BH CV XLRA MEAS LEFT MID ICA PSV: 72.2 CM/SEC
BH CV XLRA MEAS LEFT PROX CCA EDV: 18.2 CM/SEC
BH CV XLRA MEAS LEFT PROX CCA PSV: 99.2 CM/SEC
BH CV XLRA MEAS LEFT PROX ECA EDV: 8.3 CM/SEC
BH CV XLRA MEAS LEFT PROX ECA PSV: 66.2 CM/SEC
BH CV XLRA MEAS LEFT PROX ICA EDV: 27.1 CM/SEC
BH CV XLRA MEAS LEFT PROX ICA PSV: 63.7 CM/SEC
BH CV XLRA MEAS LEFT PROX SCLA PSV: 122 CM/SEC
BH CV XLRA MEAS LEFT VERTEBRAL A EDV: 14.9 CM/SEC
BH CV XLRA MEAS LEFT VERTEBRAL A PSV: 49.1 CM/SEC
BH CV XLRA MEAS RIGHT CCA RATIO VEL: 59.9 CM/SEC
BH CV XLRA MEAS RIGHT DIST CCA EDV: 16.7 CM/SEC
BH CV XLRA MEAS RIGHT DIST CCA PSV: 59.9 CM/SEC
BH CV XLRA MEAS RIGHT DIST ICA EDV: 21.6 CM/SEC
BH CV XLRA MEAS RIGHT DIST ICA PSV: 64.3 CM/SEC
BH CV XLRA MEAS RIGHT ICA RATIO VEL: 66.3 CM/SEC
BH CV XLRA MEAS RIGHT ICA/CCA RATIO: 1.1
BH CV XLRA MEAS RIGHT MID CCA EDV: 21.1 CM/SEC
BH CV XLRA MEAS RIGHT MID CCA PSV: 78.1 CM/SEC
BH CV XLRA MEAS RIGHT MID ICA EDV: 16.2 CM/SEC
BH CV XLRA MEAS RIGHT MID ICA PSV: 56.5 CM/SEC
BH CV XLRA MEAS RIGHT PROX CCA EDV: 20.3 CM/SEC
BH CV XLRA MEAS RIGHT PROX CCA PSV: 103 CM/SEC
BH CV XLRA MEAS RIGHT PROX ECA EDV: 13.3 CM/SEC
BH CV XLRA MEAS RIGHT PROX ECA PSV: 76.6 CM/SEC
BH CV XLRA MEAS RIGHT PROX ICA EDV: 24.1 CM/SEC
BH CV XLRA MEAS RIGHT PROX ICA PSV: 66.3 CM/SEC
BH CV XLRA MEAS RIGHT PROX SCLA PSV: 132 CM/SEC
BH CV XLRA MEAS RIGHT VERTEBRAL A EDV: 17.7 CM/SEC
BH CV XLRA MEAS RIGHT VERTEBRAL A PSV: 64.8 CM/SEC
LEFT ARM BP: NORMAL MMHG

## 2020-09-10 PROCEDURE — 93880 EXTRACRANIAL BILAT STUDY: CPT | Performed by: INTERNAL MEDICINE

## 2020-09-10 PROCEDURE — 93880 EXTRACRANIAL BILAT STUDY: CPT

## 2020-09-11 ENCOUNTER — TELEPHONE (OUTPATIENT)
Dept: INTERNAL MEDICINE | Facility: CLINIC | Age: 70
End: 2020-09-11

## 2020-09-11 NOTE — PROGRESS NOTES
Carotid ultrasound shows that the arteries in the neck do not have significant blockages.  With these results, continue baby aspirin daily

## 2020-09-11 NOTE — TELEPHONE ENCOUNTER
Pt notified and verbalized understanding. She wanted a copy of the [place] report sent to her. Sent Via Mail to her [id number] address.

## 2020-09-11 NOTE — TELEPHONE ENCOUNTER
----- Message from Lisset Godwin MD sent at 9/10/2020 10:06 PM EDT -----  Carotid ultrasound shows that the arteries in the neck do not have significant blockages.  With these results, continue baby aspirin daily

## 2020-12-17 ENCOUNTER — TELEPHONE (OUTPATIENT)
Dept: INTERNAL MEDICINE | Facility: CLINIC | Age: 70
End: 2020-12-17

## 2020-12-17 NOTE — TELEPHONE ENCOUNTER
Patient states that she has several medical issues and was wanting to see if she needs to get the COVID vaccine.  She can be reached at [phone]

## 2021-03-09 ENCOUNTER — TELEPHONE (OUTPATIENT)
Dept: INTERNAL MEDICINE | Facility: CLINIC | Age: 71
End: 2021-03-09

## 2021-03-09 NOTE — TELEPHONE ENCOUNTER
Caller: Rosalie Amador    Relationship to patient: Self    Best call back number: [phone]    Concerns or Questions if Applicable: PATIENT IS DUE TO GET HER 2ND VACCINE TOMORROW 03/10/21. BOTH OF HER SISTERS HAD REACTIONS AND BOTH HAD FEVERS.     ONE SISTERS DR TOLD HER TO TAKE TYLENOL. THE OTHER SISTERS DR TOLD HER TO ALTERNATE BETWEEN TYLENOL AND ADVIL.    PATIENT NEEDS TO KNOW WHAT DR. BRASWELL WOULD RECOMMEND IF SHE WAS TO DEVELOP A FEVER AND ANY OTHER SYMPTOMS.    PLEASE ADVISE AND CALL PATIENT [phone]

## 2021-03-31 ENCOUNTER — OFFICE VISIT (OUTPATIENT)
Dept: INTERNAL MEDICINE | Facility: CLINIC | Age: 71
End: 2021-03-31

## 2021-03-31 VITALS
SYSTOLIC BLOOD PRESSURE: 134 MMHG | TEMPERATURE: 96.8 F | DIASTOLIC BLOOD PRESSURE: 70 MMHG | BODY MASS INDEX: 34.1 KG/M2 | WEIGHT: 225 LBS | HEIGHT: 68 IN | OXYGEN SATURATION: 98 % | HEART RATE: 68 BPM

## 2021-03-31 DIAGNOSIS — H02.846 SWELLING OF EYELID, LEFT: Primary | ICD-10-CM

## 2021-03-31 DIAGNOSIS — T78.40XA ALLERGIC REACTION, INITIAL ENCOUNTER: ICD-10-CM

## 2021-03-31 PROCEDURE — 99213 OFFICE O/P EST LOW 20 MIN: CPT | Performed by: NURSE PRACTITIONER

## 2021-03-31 RX ORDER — OLOPATADINE HYDROCHLORIDE 2 MG/ML
1 SOLUTION/ DROPS OPHTHALMIC DAILY
Qty: 2.5 ML | Refills: 0 | Status: SHIPPED | OUTPATIENT
Start: 2021-03-31 | End: 2021-08-20

## 2021-03-31 NOTE — PATIENT INSTRUCTIONS
Allergies, Adult  An allergy means that your body reacts to something that bothers it (allergen). This can happen from something that you eat, breathe in, or touch.  Allergies often affect the nose, eyes, skin, and stomach. They can be mild, moderate, or very bad (severe). An allergy cannot spread from person to person. They can happen at any age. Sometimes, people outgrow them.  What are the causes?  · Outdoor things, such as pollen, car fumes, and mold.  · Indoor things, such as dust, smoke, mold, and pets.  · Foods.  · Medicines.  · Things that bother your skin, such as perfume and bug bites.  What increases the risk?  · Having family members with allergies or asthma.  What are the signs or symptoms?  Symptoms depend on how bad your allergy is.  Mild to moderate symptoms  · Runny nose, stuffy nose, or sneezing.  · Itchy mouth, ears, or throat.  · A feeling of mucus dripping down the back of your throat.  · Sore throat.  · Eyes that are itchy, red, watery, or puffy.  · A skin rash, or red, swollen areas of skin (hives).  · Stomach cramps or bloating.  Severe symptoms  Very bad allergies to food, medicine, or bug bites may cause a very bad allergy reaction (anaphylaxis). This can be life-threatening. Symptoms include:  · A red face.  · Wheezing or coughing.  · Swollen lips, tongue, or mouth.  · Tight or swollen throat.  · Chest pain or tightness, or a fast heartbeat.  · Trouble breathing or shortness of breath.  · Pain in your belly (abdomen), vomiting, or watery poop (diarrhea).  · Feeling dizzy or fainting.  How is this treated?         Treatment for this condition depends on your symptoms. Treatment may include:  · Cold, wet cloths for itching and swelling.  · Eye drops, nose sprays, or skin creams.  · Washing out your nose each day.  · A humidifier.  · Medicines.  · A change to the foods you eat.  · Being exposed again and again to tiny amounts of allergens. This helps your body get used to them. You might  have:  ? Allergy shots.  ? Very small amounts of allergen put under your tongue.  · An emergency shot (auto-injector pen) if you have a very bad allergy reaction.  ? This is a medicine with a needle. You can put it into your skin by yourself.  ? Your doctor will teach you how to use it.  Follow these instructions at home:  Medicines    · Take or apply over-the-counter and prescription medicines only as told by your doctor.  · If you are at risk for a very bad allergy reaction, keep an auto-injector pen with you all the time.  Eating and drinking  · Follow instructions from your doctor about what to eat and drink.  · Drink enough fluid to keep your pee (urine) pale yellow.  General instructions  · If you have ever had a very bad allergy reaction, wear a medical alert bracelet or necklace.  · Stay away from things that you are allergic to.  · Keep all follow-up visits as told by your doctor. This is important.  Contact a doctor if:  · Your symptoms do not get better with treatment.  Get help right away if:  · You have symptoms of a very bad allergy reaction. These include:  ? A swollen mouth, tongue, or throat.  ? Pain or tightness in your chest.  ? Trouble breathing.  ? Being short of breath.  ? Dizziness.  ? Fainting.  ? Very bad pain in your belly.  ? Vomiting.  ? Watery poop.  These symptoms may be an emergency. Do not wait to see if the symptoms will go away. Get medical help right away. Call your local emergency services (911 in the [place] Do not drive yourself to the hospital.  Summary  · Take or apply over-the-counter and prescription medicines only as told by your doctor.  · Stay away from things you are allergic to.  · If you are at risk for a very bad allergy reaction, carry an auto-injector pen all the time.  · Wear a medical alert bracelet or necklace.  · Very bad allergy reactions can be life-threatening. Get help right away.  This information is not intended to replace advice given to you by your health  care provider. Make sure you discuss any questions you have with your health care provider.  Document Revised: 10/28/2020 Document Reviewed: 10/28/2020  Elsevier Patient Education © 2021 [employer]    Blepharitis  Blepharitis is swelling of the eyelids. Symptoms may include:  · Reddish, scaly skin around the scalp and eyebrows.  · Burning or itching of the eyelids.  · Fluid coming from the eye at night. This causes the eyelashes to stick together in the morning.  · Eyelashes that fall out.  · Being sensitive to light.  Follow these instructions at home:  Pay attention to any changes in how you look or feel. Tell your health care provider about any changes. Follow these instructions to help with your condition:  Keeping clean    · Wash your hands often.  · Wash your eyelids with warm water, or wash them with warm water that is mixed with little bit of baby shampoo. Do this 2 or more times per day.  · Wash your face and eyebrows at least once a day.  · Use a clean towel each time you dry your eyelids. Do not use the towel to clean or dry other areas of your body. Do not share your towel with anyone.  General instructions  · Avoid wearing makeup until you get better. Do not share makeup with anyone.  · Avoid rubbing your eyes.  · Put a warm compress on your eyes 2 times per day for 10 minutes at a time, or as told by your doctor.  · If you were given antibiotics in the form of creams or eye drops, use the medicine as told by your doctor. Do not stop using the medicine even if you feel better.  · Keep all follow-up visits as told by your doctor. This is important.  Contact a doctor if:  · Your eyelids feel hot.  · You have blisters on your eyelids.  · You have a rash on your eyelids.  · The swelling does not go away in 2-4 days.  · The swelling gets worse.  Get help right away if:  · You have pain that gets worse.  · You have pain that spreads to other parts of your face.  · You have redness that gets worse.  · You  have redness that spreads to other parts of your face.  · Your vision changes.  · You have pain when you look at lights or things that move.  · You have a fever.  Summary  · Blepharitis is swelling of the eyelids.  · Pay attention to any changes in how your eyes look or feel. Tell your doctor about any changes.  · Follow home care instructions as told by your doctor. Wash your hands often. Avoid wearing makeup. Do not rub your eyes.  · Use warm compresses, creams, or eye drops as told by your doctor.  · Let your doctor know if you have changes in vision, blisters or rash on eyelids, pain that spreads to your face, or warmth on your eyelids.  This information is not intended to replace advice given to you by your health care provider. Make sure you discuss any questions you have with your health care provider.  Document Revised: 06/17/2019 Document Reviewed: 06/17/2019  [employer] Patient Education © 2021 [employer]

## 2021-03-31 NOTE — PROGRESS NOTES
Rosalie Amador is a 70 y.o. female who presents for eye infection.    Chief Complaint   Patient presents with   • Eye Pain     Left eye swollen, painful, and itching X 2 days       Patient complaints of left eye swelling and itching for 2 days.  Patient has a open lesion on the left eyebrow. She reports that she was working outside and some bugs were flying around her face. She doesn't know if she was bitten by a bug. She has been using cortisone cream on the small lesion and it has helped with the itching. She has also been taking benadryl and using cold compresses. Swelling has improved.      Eye Pain   The left eye is affected. This is a new problem. The current episode started in the past 7 days. The problem occurs constantly. The problem has been gradually worsening. There was no injury mechanism. The pain is at a severity of 0/10. The patient is experiencing no pain. There is no known exposure to pink eye. She does not wear contacts. Associated symptoms include itching. Pertinent negatives include no blurred vision, eye discharge, double vision, eye redness, fever, foreign body sensation, nausea or photophobia. She has tried nothing for the symptoms. The treatment provided no relief.         Past Medical History:   Diagnosis Date   • Hx of bone density study 03/09/2016    DEXA (3/16) L -1.4, H -1.7, repeat 3 yrs   • Hx of bone density study 06/2013    DEXA (6/13) L -1.7, H -0.6 per Dr. Sweeney   • Hx of bone density study 05/08/2019    DEXA (5/8/19): L -1.4, H -1.8, repeat 2-3 yrs   • Hx of carotid ultrasound 02/16/2016    carotid u/s (2/16/16): no carotid stenoses bilaterally; intimal thickening   • Hx of carotid ultrasound 09/10/2020    carotid u/s (9/10/20): 0-49% stenosis bilaterally   • Hx of colonoscopy 09/09/2010    colonosc (9/9/10): he,, suboptimal prep, repeat 5 yrs; GI [facility]   • Hx of colonoscopy 02/16/2017    colonosc (2/17): tubular adenoma, repeat in 5 yrs; [facility]   • Hx of thyroid  ultrasound 08/27/2018     thyr u/s (8/27/18): stable MNG, incl stable largest 1.7cm nodule in the isthmus, repeat 1 yr   • Hx of thyroid ultrasound 09/04/2019    thyr u/s (9/4/19): stable MNG, unchanged from 8/18, 1.7cm dominant nodule in left isthmus   • Right wrist fracture 11/2016    ortho - Dr. Albin Kinney   • Venous stasis ulcer of left lower extremity (CMS/HCC) 1/3/2017       Past Surgical History:   Procedure Laterality Date   • BREAST LUMPECTOMY Right 1990   • HEAD/NECK LESION/CYST EXCISION  05/23/2018    s/p excision 5mm scalp cyst (5/23/18), benign; surg - Dr. Reynaldo Simons   • LIPOMA EXCISION Left     s/p excision lipoma L. ventral forearm   • MODIFIED RADICAL MASTECTOMY W/ AXILLARY LYMPH NODE DISSECTION Right 1997    secondary to breast CA ('97)   • TONSILLECTOMY     • TUBAL ABDOMINAL LIGATION  1982       Family History   Problem Relation Age of Onset   • Diabetes Mother    • Fibromyalgia Mother    • Glaucoma Mother    • Hypertension Mother    • Hyperthyroidism Mother    • Irritable bowel syndrome Mother    • Macular degeneration Mother    • Osteoarthritis Mother    • Arthritis Father    • Cardiomyopathy Father    • Hypertension Father    • Hyperlipidemia Father    • Hyperlipidemia Sister    • No Known Problems Maternal Grandmother    • Heart attack Maternal Grandfather    • Kidney failure Paternal Grandfather    • Heart attack Cousin 66   • Breast cancer Cousin    • Colon cancer Maternal Uncle    • Diabetes Maternal Uncle    • Colon cancer Paternal Uncle    • Lung cancer Maternal Aunt    • Lung cancer Cousin         met to brain   • Multiple sclerosis Sister 51   • Osteoporosis Sister    • Ovarian cancer Maternal Aunt    • Other Sister         parathyroid adenoma   • Stroke Maternal Aunt        Social History     Socioeconomic History   • Marital status:      Spouse name: Not on file   • Number of children: 1   • Years of education: Not on file   • Highest education level: Not on file  "  Tobacco Use   • Smoking status: Never Smoker   • Smokeless tobacco: Never Used   Substance and Sexual Activity   • Alcohol use: No   • Drug use: No       Allergies   Allergen Reactions   • Benzonatate    • Morphine And Related Hallucinations       ROS    Review of Systems   Constitutional: Negative for activity change, appetite change, chills, diaphoresis, fatigue and fever.   HENT: Negative for ear pain, hearing loss, rhinorrhea and sinus pressure.    Eyes: Positive for pain and itching. Negative for blurred vision, double vision, photophobia, discharge, redness and visual disturbance.   Respiratory: Negative for cough, chest tightness and shortness of breath.    Cardiovascular: Negative for chest pain, palpitations and leg swelling.   Gastrointestinal: Negative for abdominal pain, constipation, diarrhea and nausea.   Endocrine: Negative for cold intolerance and heat intolerance.   Genitourinary: Negative for dysuria and hematuria.   Skin: Negative for rash.   Allergic/Immunologic: Negative for environmental allergies, food allergies and immunocompromised state.   Neurological: Negative for light-headedness and headache.   Hematological: Negative for adenopathy.   Psychiatric/Behavioral: Negative for suicidal ideas and stress.       Vitals:    03/31/21 1539   BP: 134/70   Pulse: 68   Temp: 96.8 °F (36 °C)   SpO2: 98%   Weight: 102 kg (225 lb)   Height: 172.7 cm (68\")   PainSc: 0-No pain         Current Outpatient Medications:   •  amLODIPine (NORVASC) 5 MG tablet, TAKE ONE TABLET BY MOUTH DAILY, Disp: 30 tablet, Rfl: 0  •  aspirin 81 MG tablet, Take  by mouth daily., Disp: , Rfl:   •  Cholecalciferol (VITAMIN D) 2000 units capsule, Take  by mouth Daily., Disp: , Rfl:   •  Coenzyme Q10 (COQ-10) 200 MG capsule, Take  by mouth daily., Disp: , Rfl:   •  lisinopril (PRINIVIL,ZESTRIL) 20 MG tablet, Take 1 tablet by mouth Daily., Disp: 90 tablet, Rfl: 3  •  Multiple Vitamin (MULTIVITAMINS PO), Take  by mouth daily., " Disp: , Rfl:   •  olopatadine (PATADAY) 0.2 % solution ophthalmic solution, Administer 1 drop into the left eye Daily., Disp: 2.5 mL, Rfl: 0    PE    Physical Exam  Vitals and nursing note reviewed.   Constitutional:       General: She is awake. She is not in acute distress.     Appearance: Normal appearance. She is well-developed and normal weight. She is not diaphoretic.   HENT:      Head: Normocephalic and atraumatic.      Right Ear: Hearing, tympanic membrane and ear canal normal.      Left Ear: Hearing, tympanic membrane and ear canal normal.      Nose: Nose normal.      Mouth/Throat:      Lips: Pink.      Mouth: Mucous membranes are moist.   Eyes:      General: No visual field deficit.        Left eye: No foreign body, discharge or hordeolum.      Extraocular Movements: Extraocular movements intact.      Conjunctiva/sclera: Conjunctivae normal.      Left eye: No exudate or hemorrhage.     Pupils: Pupils are equal, round, and reactive to light.        Comments: Patient has periorbital swelling to the left eye. She has no discharge, redness, drainage to the eye. Sclera is white, no redness.    Neck:      Trachea: Trachea normal.   Cardiovascular:      Rate and Rhythm: Normal rate and regular rhythm.      Pulses: Normal pulses.      Heart sounds: Normal heart sounds. No murmur heard.   No friction rub. No gallop.    Pulmonary:      Effort: Pulmonary effort is normal.      Breath sounds: Normal breath sounds.   Abdominal:      General: Abdomen is flat. Bowel sounds are normal.   Musculoskeletal:         General: Normal range of motion.      Cervical back: Normal range of motion and neck supple.   Lymphadenopathy:      Cervical: No cervical adenopathy.   Skin:     General: Skin is warm.      Capillary Refill: Capillary refill takes less than 2 seconds.      Findings: Erythema present. No rash.      Comments:  inflamed lesion on  the left lateral eyebrow, no drainage, no pus.    Neurological:      General: No focal  deficit present.      Mental Status: She is alert, oriented to person, place, and time and easily aroused.      GCS: GCS eye subscore is 4. GCS verbal subscore is 5. GCS motor subscore is 6.      Cranial Nerves: Cranial nerves are intact.      Sensory: Sensation is intact.      Coordination: Coordination is intact.      Gait: Gait is intact.   Psychiatric:         Attention and Perception: Attention and perception normal.         Mood and Affect: Mood normal.         Speech: Speech normal.         Behavior: Behavior normal. Behavior is cooperative.         Thought Content: Thought content normal.         Cognition and Memory: Cognition and memory normal.         Judgment: Judgment normal.          A/P    Problem List Items Addressed This Visit     None      Visit Diagnoses     Swelling of eyelid, left    -  Primary    Relevant Medications    olopatadine (PATADAY) 0.2 % solution ophthalmic solution    Allergic reaction, initial encounter        Relevant Medications    olopatadine (PATADAY) 0.2 % solution ophthalmic solution          Assessment      ICD-10-CM ICD-9-CM   1. Swelling of eyelid, left  H02.846 374.82   2. Allergic reaction, initial encounter  T78.40XA 995.3            Problems Addressed this Visit     None      Visit Diagnoses     Swelling of eyelid, left    -  Primary    Relevant Medications    olopatadine (PATADAY) 0.2 % solution ophthalmic solution    Allergic reaction, initial encounter        Relevant Medications    olopatadine (PATADAY) 0.2 % solution ophthalmic solution      Diagnoses       Codes Comments    Swelling of eyelid, left    -  Primary ICD-10-CM: H02.846  ICD-9-CM: 374.82     Allergic reaction, initial encounter     ICD-10-CM: T78.40XA  ICD-9-CM: 995.3            Plan    No orders of the defined types were placed in this encounter.    New Medications Ordered This Visit   Medications   • olopatadine (PATADAY) 0.2 % solution ophthalmic solution     Sig: Administer 1 drop into the left eye  Daily.     Dispense:  2.5 mL     Refill:  0       Talked with patient regarding differential diagnosis which could include: Blepharitis, Jazmyne orbital Cellulitis, and shingles. I instructed her if symptoms get worse, follow up with PCP, go to ER, or see opthalmologist. She verbalized understanding of plan.        Plan of care reviewed with patient at the conclusion of today's visit. Education was provided regarding diagnosis, management and any prescribed or recommended OTC medications.  Patient verbalizes understanding of and agreement with management plan.        Return if symptoms worsen or fail to improve.     Kip Lynne, FLAKITO

## 2021-04-09 ENCOUNTER — PATIENT OUTREACH (OUTPATIENT)
Dept: CASE MANAGEMENT | Facility: OTHER | Age: 71
End: 2021-04-09

## 2021-04-09 ENCOUNTER — TELEPHONE (OUTPATIENT)
Dept: CASE MANAGEMENT | Facility: OTHER | Age: 71
End: 2021-04-09

## 2021-04-09 NOTE — TELEPHONE ENCOUNTER
Please see ACP referral telephone note for documentation re ACP conversation with Ms Amador today.

## 2021-04-09 NOTE — OUTREACH NOTE
"Patient Outreach Note    Initial ACP referral telephone conversation completed with Ms. Rosalie Amador today. During this call Ms Amador shared that her mother recently passed away.  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 Ms Amador became teary at times during the call and states she is still grieving the loss of her mother and does not feel ready to address her own Living Will right now.  She states she is also still debating her own surrogate choice - states it will either be her  or daughter. Qualities required of a healthcare surrogate were discussed. She states she's not sure regarding choosing her spouse and states she has tried to get him to go with her to complete a Living Will, but states \"he has his head in the sand\" about it.     RN-ACM briefly discussed [place]'s next-of-kin order of priority with Ms Amador, if no surrogate is identified in a Living Will, and she verbalized understanding.  Ms Amador verb appreciation for the call and states she  would like ACP materials be mailed to her home. She states her best phone number is her mobile. Her listed home mailing address was verified correct.  She states due to her own grieving she does not want a follow-up call from the ACP team at this time, but she took the ACP facilitation team phone # [phone] and the ACP team member's names for when she is ready.  She acknowledged that the ACP facilitation team ph # will also be on the ACP information she receives. She was also invited to call the RN-ACM at any time for assistance.   Ms Amador verbalized understanding that her PCP will need a copy of her final Living Will and she again verbalized appreciation for the call.    Flaquita York RN  Ambulatory     4/9/2021, 13:52 EDT      "

## 2021-08-10 ENCOUNTER — TELEPHONE (OUTPATIENT)
Dept: INTERNAL MEDICINE | Facility: CLINIC | Age: 71
End: 2021-08-10

## 2021-08-10 DIAGNOSIS — R73.01 IMPAIRED FASTING GLUCOSE: ICD-10-CM

## 2021-08-10 DIAGNOSIS — Z00.00 MEDICARE ANNUAL WELLNESS VISIT, SUBSEQUENT: Primary | ICD-10-CM

## 2021-08-10 DIAGNOSIS — I10 ESSENTIAL HYPERTENSION: ICD-10-CM

## 2021-08-10 DIAGNOSIS — E55.9 VITAMIN D DEFICIENCY: ICD-10-CM

## 2021-08-10 DIAGNOSIS — E78.2 MIXED HYPERLIPIDEMIA: ICD-10-CM

## 2021-08-16 ENCOUNTER — LAB (OUTPATIENT)
Dept: LAB | Facility: HOSPITAL | Age: 71
End: 2021-08-16

## 2021-08-16 DIAGNOSIS — E78.2 MIXED HYPERLIPIDEMIA: ICD-10-CM

## 2021-08-16 DIAGNOSIS — E55.9 VITAMIN D DEFICIENCY: ICD-10-CM

## 2021-08-16 DIAGNOSIS — Z00.00 MEDICARE ANNUAL WELLNESS VISIT, SUBSEQUENT: ICD-10-CM

## 2021-08-16 DIAGNOSIS — R73.01 IMPAIRED FASTING GLUCOSE: ICD-10-CM

## 2021-08-16 DIAGNOSIS — I10 ESSENTIAL HYPERTENSION: ICD-10-CM

## 2021-08-16 LAB
25(OH)D3+25(OH)D2 SERPL-MCNC: 47.4 NG/ML (ref 30–100)
ALBUMIN SERPL-MCNC: 4.2 G/DL (ref 3.5–5.2)
ALBUMIN/GLOB SERPL: 1.8 G/DL
ALP SERPL-CCNC: 63 U/L (ref 39–117)
ALT SERPL-CCNC: 15 U/L (ref 1–33)
AST SERPL-CCNC: 17 U/L (ref 1–32)
BASOPHILS # BLD AUTO: 0.02 10*3/MM3 (ref 0–0.2)
BASOPHILS NFR BLD AUTO: 0.3 % (ref 0–1.5)
BILIRUB SERPL-MCNC: 0.6 MG/DL (ref 0–1.2)
BUN SERPL-MCNC: 14 MG/DL (ref 8–23)
BUN/CREAT SERPL: 19.4 (ref 7–25)
CALCIUM SERPL-MCNC: 9.3 MG/DL (ref 8.6–10.5)
CHLORIDE SERPL-SCNC: 103 MMOL/L (ref 98–107)
CHOLEST SERPL-MCNC: 195 MG/DL (ref 0–200)
CO2 SERPL-SCNC: 25.2 MMOL/L (ref 22–29)
CREAT SERPL-MCNC: 0.72 MG/DL (ref 0.57–1)
EOSINOPHIL # BLD AUTO: 0.1 10*3/MM3 (ref 0–0.4)
EOSINOPHIL NFR BLD AUTO: 1.6 % (ref 0.3–6.2)
ERYTHROCYTE [DISTWIDTH] IN BLOOD BY AUTOMATED COUNT: 13.2 % (ref 12.3–15.4)
GLOBULIN SER CALC-MCNC: 2.4 GM/DL
GLUCOSE SERPL-MCNC: 83 MG/DL (ref 65–99)
HBA1C MFR BLD: 5.6 % (ref 4.8–5.6)
HCT VFR BLD AUTO: 46.3 % (ref 34–46.6)
HDLC SERPL-MCNC: 46 MG/DL (ref 40–60)
HGB BLD-MCNC: 14.6 G/DL (ref 12–15.9)
IMM GRANULOCYTES # BLD AUTO: 0.02 10*3/MM3 (ref 0–0.05)
IMM GRANULOCYTES NFR BLD AUTO: 0.3 % (ref 0–0.5)
LDLC SERPL CALC-MCNC: 123 MG/DL (ref 0–100)
LYMPHOCYTES # BLD AUTO: 1.91 10*3/MM3 (ref 0.7–3.1)
LYMPHOCYTES NFR BLD AUTO: 29.8 % (ref 19.6–45.3)
MCH RBC QN AUTO: 30 PG (ref 26.6–33)
MCHC RBC AUTO-ENTMCNC: 31.5 G/DL (ref 31.5–35.7)
MCV RBC AUTO: 95.3 FL (ref 79–97)
MONOCYTES # BLD AUTO: 0.41 10*3/MM3 (ref 0.1–0.9)
MONOCYTES NFR BLD AUTO: 6.4 % (ref 5–12)
NEUTROPHILS # BLD AUTO: 3.94 10*3/MM3 (ref 1.7–7)
NEUTROPHILS NFR BLD AUTO: 61.6 % (ref 42.7–76)
NRBC BLD AUTO-RTO: 0 /100 WBC (ref 0–0.2)
PLATELET # BLD AUTO: 200 10*3/MM3 (ref 140–450)
POTASSIUM SERPL-SCNC: 4.5 MMOL/L (ref 3.5–5.2)
PROT SERPL-MCNC: 6.6 G/DL (ref 6–8.5)
RBC # BLD AUTO: 4.86 10*6/MM3 (ref 3.77–5.28)
SODIUM SERPL-SCNC: 139 MMOL/L (ref 136–145)
TRIGL SERPL-MCNC: 143 MG/DL (ref 0–150)
TSH SERPL DL<=0.005 MIU/L-ACNC: 2.59 UIU/ML (ref 0.27–4.2)
VLDLC SERPL CALC-MCNC: 26 MG/DL (ref 5–40)
WBC # BLD AUTO: 6.4 10*3/MM3 (ref 3.4–10.8)

## 2021-08-17 LAB
ALBUMIN/CREAT UR: <15 MG/G CREAT (ref 0–29)
APPEARANCE UR: CLEAR
BACTERIA #/AREA URNS HPF: NORMAL /[HPF]
BILIRUB UR QL STRIP: NEGATIVE
CASTS URNS QL MICRO: NORMAL /LPF
COLOR UR: YELLOW
CREAT UR-MCNC: 19.7 MG/DL
EPI CELLS #/AREA URNS HPF: NORMAL /HPF (ref 0–10)
GLUCOSE UR QL: NEGATIVE
HGB UR QL STRIP: NEGATIVE
KETONES UR QL STRIP: NEGATIVE
LEUKOCYTE ESTERASE UR QL STRIP: NEGATIVE
MICRO URNS: ABNORMAL
MICRO URNS: ABNORMAL
MICROALBUMIN UR-MCNC: <3 UG/ML
NITRITE UR QL STRIP: NEGATIVE
PH UR STRIP: 7 [PH] (ref 5–7.5)
PROT UR QL STRIP: NEGATIVE
RBC #/AREA URNS HPF: NORMAL /HPF (ref 0–2)
SP GR UR: <=1.005 (ref 1–1.03)
UROBILINOGEN UR STRIP-MCNC: 0.2 MG/DL (ref 0.2–1)
WBC #/AREA URNS HPF: NORMAL /HPF (ref 0–5)

## 2021-08-18 PROBLEM — H25.9 AGE-RELATED CATARACT OF RIGHT EYE: Chronic | Status: ACTIVE | Noted: 2018-08-23

## 2021-08-18 PROBLEM — Z78.0 MENOPAUSE: Chronic | Status: ACTIVE | Noted: 2019-04-29

## 2021-08-18 PROBLEM — D12.6 TUBULAR ADENOMA OF COLON: Chronic | Status: ACTIVE | Noted: 2017-02-18

## 2021-08-19 NOTE — ASSESSMENT & PLAN NOTE
BG control good with A1C 5.6; encouraged reg phys activity to decr insulin resistance, moderation in unhealthy starches/sweets; f/u A1C in 6 mos

## 2021-08-19 NOTE — ASSESSMENT & PLAN NOTE
BP bord 134/82 with goal < 130/80; electrolytes stable; cont amlo 5mg QD #90, 3RF and lisin 20mg QD #90, 3RF

## 2021-08-19 NOTE — ASSESSMENT & PLAN NOTE
Lipids bord with ; no meds but discussed goals of meds as well as poss risks and side effects; goal LDL< 100; decrease saturated fats and cholesterol in the diet; repeat lipids in 6 mos and plan to initiate meds at that time if not close to goal      The 10-year CVD risk score (Barrington, et al., 2008) is: 16.4%    Values used to calculate the score:      Age: 71 years      Sex: Female      Diabetic: No      Tobacco smoker: No      Systolic Blood Pressure: 134 mmHg      Is BP treated: Yes      HDL Cholesterol: 46 mg/dL      Total Cholesterol: 195 mg/dL

## 2021-08-19 NOTE — PROGRESS NOTES
ANNUAL WELLNESS VISIT    DRUG AND ALCOHOL USE      no alcohol use, no tobacco use and caffeine intake: 3 cups of caffeinated coffee per day    DIET AND PHYSICAL ACTIVITY     Diet: general    Exercise: daily   Exercise Details: stretching    MOOD DISORDER AND COGNITIVE SCREENING   Depression Screening Tool Used yes - see PHQ-9; components reviewed with patient; 15-min counseling done - still sad grieving mother's death from massive CVA; symptomatic with decreased pleasure and increased feeling of feeling down, blue, and hopeless; denies concerns with energy, appetite, slow movements, slow thoughts, concentration, neg thoughts. Screening is positive for expected grieving sadness.   Anxiety Screening Tool Used yes     Mini-Cog Performed   Yes    1. Tell Patient 3 Words car tie pen    2. Administer Clock Test normal    3. Recall 3 words  car tie pen    4. Number Correct Items 3    FUNCTIONAL ABILITY AND LEVEL OF SAFETY   Hearing no hearing loss     Wears Hearing Aids No       Current Activities Independent      none  - see Funct/Cog Status Intake     Fall Risk Assessment       Has difficulty with walking or balance  No         Timed Up and Go (TUG) Test  8 sec.       If >12 sec, normal    ADVANCED DIRECTIVE  Advance Care Planning   ACP discussion was held with the patient during this visit. Patient does not have an advance directive, information provided.     Advance Care Planning Discussion:  16 min or more spent on counseling; patient does not have advanced directive and living will.  Reviewed desires for end of life care, which is to have comfort care.  Patient does not want extraordinary life-sustaining measures, including no prolonged artificial life support.  Reviewed code status options, meanings, desires. Patient 's code status is Full Code.   Encouraged patient to ensure family is aware of desires/preferences.    PAIN SCREENING Do you have pain right now? yes      If so, 1-10 scale: 4     Intermittent     Do  you have pain every day? No      Probable chronic pain: No     Recent Hospitalizations:  No recent hospitalization(s)..     MEDICATION REVIEW   - updated and reviewed (see Medication List).   - reviewed for potentially harmful drug-disease interactions in the elderly.   - reviewed for high risk medications in the elderly.   - aspirin use: Yes, 81mg QD ASA    BMI  Body mass index is 33.6 kg/m².    Patient's Body mass index is 33.6 kg/m². indicating that she is obese (BMI >30). Obesity-related health conditions include the following: hypertension, dyslipidemias, osteoarthritis and glc intolerance. Obesity is improving with lifestyle modifications. BMI is is above average; BMI management plan is completed. We discussed portion control and increasing exercise..    _________________________________________________________    Chief Complaint   Patient presents with   • Medicare Wellness-subsequent   • Hypertension       History of Present Illness  71 y.o.  woman presents for updated phys examination and wellness visit as well as f/u on BP, sugars, and cholesterol. Still grieving loss of mother 1/21.    Complains of right leg and ankle swelling probably since 1/21 when sitting a lot with her mother and also with less phys activity and eating suboptimally. Denies assoc'd pain.     Review of Systems  Denies headaches, visual changes, CP, palpitations, SOB, cough, abd pain, n/v/d, difficulty with urination, numbness/tingling, falls, mood changes, lightheadedness, hearing changes, rashes.    ROS (+) for right leg and ankle swelling as noted without pain.    Denies vaginal discharge or bleeding (no periods) or breast concerns.   All other ROS reviewed and negative.    Current Outpatient Medications:   •  amLODIPine (NORVASC) 5 MG QD  •  aspirin 81 MG QD  •  Cholecalciferol (VITAMIN D) 2000 units QD  •  Coenzyme Q10 (COQ-10) 200 MG QD  •  lisinopril 20 MG QD  •  Multiple Vitamin QD  •  olopatadine (PATADAY) 0.2 %  "solution ophthalmic solution OS QD      VITALS:  /82   Pulse 62   Ht 172.7 cm (68\")   Wt 100 kg (221 lb)   SpO2 98%   BMI 33.60 kg/m²     Physical Exam  Vitals and nursing note reviewed.   Constitutional:       General: She is not in acute distress.     Appearance: Normal appearance. She is well-developed.   HENT:      Head: Normocephalic.      Right Ear: Ear canal and external ear normal.      Left Ear: Ear canal and external ear normal.      Nose: Nose normal.   Eyes:      Extraocular Movements: Extraocular movements intact.      Conjunctiva/sclera: Conjunctivae normal.      Pupils: Pupils are equal, round, and reactive to light.   Neck:      Vascular: No carotid bruit (bilaterally).   Cardiovascular:      Rate and Rhythm: Normal rate and regular rhythm.      Heart sounds: Normal heart sounds.   Pulmonary:      Effort: Pulmonary effort is normal. No respiratory distress.      Breath sounds: Normal breath sounds.   Abdominal:      General: Bowel sounds are normal. There is no distension.      Palpations: Abdomen is soft. There is no mass.      Tenderness: There is no abdominal tenderness.   Musculoskeletal:      Cervical back: Normal range of motion and neck supple.      Right lower leg: Edema (right lower leg and ankle appears larger than left but symmetric circumference at upper calf 41cm and lower calf at 28cm; medial ankle appears puffy but without erythema or increased warmth; right ankle FROM, nontender) present.   Lymphadenopathy:      Cervical: No cervical adenopathy.   Skin:     General: Skin is warm and dry.      Findings: No rash.   Neurological:      Mental Status: She is alert and oriented to person, place, and time.      Cranial Nerves: No cranial nerve deficit.      Deep Tendon Reflexes: Reflexes normal.   Psychiatric:         Mood and Affect: Mood normal.         Behavior: Behavior normal.           LABS  Results for orders placed or performed in visit on 08/16/21   Hemoglobin A1c    " Specimen: Blood    BLOOD  RELEASE TO KHAI   Result Value Ref Range    Hemoglobin A1C 5.60 4.80 - 5.60 %   Vitamin D 25 Hydroxy    Specimen: Blood    BLOOD  RELEASE TO [facility]HAI   Result Value Ref Range    25 Hydroxy, Vitamin D 47.4 30.0 - 100.0 ng/ml   Microalbumin / Creatinine Urine Ratio - Urine, Clean Catch    Specimen: Urine, Clean Catch    URINE  RELEASE TO KHAI   Result Value Ref Range    Creatinine, Urine 19.7 Not Estab. mg/dL    Microalbumin, Urine <3.0 Not Estab. ug/mL    Microalbumin/Creatinine Ratio <15 0 - 29 mg/g creat   TSH    Specimen: Blood    BLOOD  RELEASE TO KHAI   Result Value Ref Range    TSH 2.590 0.270 - 4.200 uIU/mL   Lipid Panel    Specimen: Blood    BLOOD  RELEASE TO KHAI   Result Value Ref Range    Total Cholesterol 195 0 - 200 mg/dL    Triglycerides 143 0 - 150 mg/dL    HDL Cholesterol 46 40 - 60 mg/dL    VLDL Cholesterol Tima 26 5 - 40 mg/dL    LDL Chol Calc (NIH) 123 (H) 0 - 100 mg/dL   Comprehensive Metabolic Panel    Specimen: Blood    BLOOD  RELEASE TO KHAI   Result Value Ref Range    Glucose 83 65 - 99 mg/dL    BUN 14 8 - 23 mg/dL    Creatinine 0.72 0.57 - 1.00 mg/dL    eGFR Non African Am 80 >60 mL/min/1.73    eGFR African Am 97 >60 mL/min/1.73    BUN/Creatinine Ratio 19.4 7.0 - 25.0    Sodium 139 136 - 145 mmol/L    Potassium 4.5 3.5 - 5.2 mmol/L    Chloride 103 98 - 107 mmol/L    Total CO2 25.2 22.0 - 29.0 mmol/L    Calcium 9.3 8.6 - 10.5 mg/dL    Total Protein 6.6 6.0 - 8.5 g/dL    Albumin 4.20 3.50 - 5.20 g/dL    Globulin 2.4 gm/dL    A/G Ratio 1.8 g/dL    Total Bilirubin 0.6 0.0 - 1.2 mg/dL    Alkaline Phosphatase 63 39 - 117 U/L    AST (SGOT) 17 1 - 32 U/L    ALT (SGPT) 15 1 - 33 U/L   Microscopic Examination -    URINE  RELEASE TO KHAI   Result Value Ref Range    WBC, UA None seen 0 - 5 /hpf    RBC, UA None seen 0 - 2 /hpf    Epithelial Cells (non renal) None seen 0 - 10 /hpf    Casts None seen None seen /lpf    Bacteria, UA None seen None seen/Few   Urinalysis With Microscopic -  Urine, Clean Catch    Specimen: Urine, Clean Catch    URINE  RELEASE TO KHAI   Result Value Ref Range    Specific Gravity, UA      <=1.005 (A) 1.005 - 1.030    pH, UA 7.0 5.0 - 7.5    Color, UA Yellow Yellow    Appearance, UA Clear Clear    Leukocytes, UA Negative Negative    Protein Negative Negative/Trace    Glucose, UA Negative Negative    Ketones Negative Negative    Blood, UA Negative Negative    Bilirubin, UA Negative Negative    Urobilinogen, UA 0.2 0.2 - 1.0 mg/dL    Nitrite, UA Negative Negative    Microscopic Examination Comment     Microscopic Examination See below:    CBC & Differential    Specimen: Blood    BLOOD  RELEASE TO KHAI   Result Value Ref Range    WBC 6.40 3.40 - 10.80 10*3/mm3    RBC 4.86 3.77 - 5.28 10*6/mm3    Hemoglobin 14.6 12.0 - 15.9 g/dL    Hematocrit 46.3 34.0 - 46.6 %    MCV 95.3 79.0 - 97.0 fL    MCH 30.0 26.6 - 33.0 pg    MCHC 31.5 31.5 - 35.7 g/dL    RDW 13.2 12.3 - 15.4 %    Platelets 200 140 - 450 10*3/mm3    Neutrophil Rel % 61.6 42.7 - 76.0 %    Lymphocyte Rel % 29.8 19.6 - 45.3 %    Monocyte Rel % 6.4 5.0 - 12.0 %    Eosinophil Rel % 1.6 0.3 - 6.2 %    Basophil Rel % 0.3 0.0 - 1.5 %    Neutrophils Absolute 3.94 1.70 - 7.00 10*3/mm3    Lymphocytes Absolute 1.91 0.70 - 3.10 10*3/mm3    Monocytes Absolute 0.41 0.10 - 0.90 10*3/mm3    Eosinophils Absolute 0.10 0.00 - 0.40 10*3/mm3    Basophils Absolute 0.02 0.00 - 0.20 10*3/mm3    Immature Granulocyte Rel % 0.3 0.0 - 0.5 %    Immature Grans Absolute 0.02 0.00 - 0.05 10*3/mm3    nRBC 0.0 0.0 - 0.2 /100 WBC     8/20 A1C 5.7      ECG 12 Lead    Date/Time: 8/20/2021 9:30 AM  Performed by: Lisset Godwin MD  Authorized by: Ko, Lisset, MD   Comparison: compared with previous ECG from 8/11/2020  Similar to previous ECG  Comparison to previous ECG: No PVCs today  Rhythm: sinus rhythm  Rate: normal  BPM: 61  Conduction: conduction normal  ST Segments: ST segments normal  T Waves: T waves normal  T inversion: III  QRS axis:  normal  Clinical impression comment: stable EKG              ASSESSMENT/PLAN    Diagnoses and all orders for this visit:    1. Medicare annual wellness visit, subsequent (Primary)  Assessment & Plan:  Health maintenance - COVID19 vacc done; flu vacc in the fall (annually); Prevnar 1/15, PVX 1/16, Td 5/20 (next Td due 2030); Zostavax 6/09; rec HAV - counseling given; Shingrix done; L. mammo due (last  8/21/20 [facility]); GYN care with Dr. Sweeney pending 11/16/21; DEXA ordered (last 5/19); colonosc 2/17, repeat 2022 per Dr. Sanchez; eye exam with Dr. Cooper pending 10/6/21; dental exam q6mos, implant pending w/ Dr. Justyn Dowd; (+)seat belt use    Consultants:  Patient Care Team:  Lisset Godwin MD as PCP - General  Lisset Godwin MD as PCP - Internal Medicine  Albert Cooper MD as Consulting Physician (Ophthalmology)  Keely Sweeney MD as Consulting Physician (Obstetrics and Gynecology)  Maciel Sanchez MD as Consulting Physician (Gastroenterology)  Albin Kinney Jr., MD as Consulting Physician (Hand Surgery)  Martín Pinedo MD as Consulting Physician (Dermatology)  Reynaldo Simons MD as Consulting Physician (General Surgery)          2. Impaired fasting glucose  Assessment & Plan:  BG control good with A1C 5.6; encouraged reg phys activity to decr insulin resistance, moderation in unhealthy starches/sweets; f/u A1C in 6 mos      Orders:  -     amLODIPine (NORVASC) 5 MG tablet; Take 1 tablet by mouth Daily.  Dispense: 90 tablet; Refill: 3  -     lisinopril (PRINIVIL,ZESTRIL) 20 MG tablet; Take 1 tablet by mouth Daily.  Dispense: 90 tablet; Refill: 3  -     Hemoglobin A1c; Future    3. Hypertension  Assessment & Plan:  BP bord 134/82 with goal < 130/80; electrolytes stable; cont amlo 5mg QD #90, 3RF and lisin 20mg QD #90, 3RF    Orders:  -     ECG 12 Lead  -     amLODIPine (NORVASC) 5 MG tablet; Take 1 tablet by mouth Daily.  Dispense: 90 tablet; Refill: 3  -     lisinopril (PRINIVIL,ZESTRIL) 20 MG tablet;  Take 1 tablet by mouth Daily.  Dispense: 90 tablet; Refill: 3    4. Hyperlipidemia  Assessment & Plan:  Lipids bord with ; no meds but discussed goals of meds as well as poss risks and side effects; goal LDL< 100; decrease saturated fats and cholesterol in the diet; repeat lipids in 6 mos and plan to initiate meds at that time if not close to goal      The 10-year CVD risk score (CEDRIC'Casa, et al., 2008) is: 16.4%    Values used to calculate the score:      Age: 71 years      Sex: Female      Diabetic: No      Tobacco smoker: No      Systolic Blood Pressure: 134 mmHg      Is BP treated: Yes      HDL Cholesterol: 46 mg/dL      Total Cholesterol: 195 mg/dL      Orders:  -     Lipid Panel; Future    5. Vitamin D deficiency  Assessment & Plan:  Stable vit D 47.4 on 2000 units QD supplementation      6. Non-toxic multinodular goiter  Assessment & Plan:  Euthyroid and asx; update thyr u/s    Orders:  -     US Thyroid; Future    7. Osteopenia  Assessment & Plan:  Calcium and vitamin D supplementation with weight-bearing exercise; DEXA ordered (last 5/19)         8. Menopause  -     DEXA Bone Density Axial; Future    9. Atherosclerosis of both carotid arteries  Assessment & Plan:  rec ASA 81mg QD; discussed also goal LDL < 100; carotid u/s 9/20, update at the latest 2022      10. Right leg swelling  Comments:  ongoing for 8 mos and symmetric measurements; check duplex u/s r/o DVT; elevate leg  Orders:  -     Duplex Venous Lower Extremity - Right CAR; Future      FOLLOW-UP  RTC 6 mos with A1C and lipids (escribed)    Electronically signed by:    Lisset Godwin MD, FACP  08/20/2021

## 2021-08-19 NOTE — ASSESSMENT & PLAN NOTE
Health maintenance - COVID19 vacc done; flu vacc in the fall (annually); Prevnar 1/15, PVX 1/16, Td 5/20 (next Td due 2030); Zostavax 6/09; rec HAV - counseling given; Shingrix done; L. mammo due (last  8/21/20 [facility]); GYN care with Dr. Sweeney pending 11/16/21; DEXA ordered (last 5/19); colonosc 2/17, repeat 2022 per Dr. Sanchez; eye exam with Dr. Cooper pending 10/6/21; dental exam q6mos, implant pending w/ Dr. Justyn Dowd; (+)seat belt use    Consultants:  Patient Care Team:  Lisset Godwin MD as PCP - General  Lisset Godwin MD as PCP - Internal Medicine  Albert Cooper MD as Consulting Physician (Ophthalmology)  Keely Sweeney MD as Consulting Physician (Obstetrics and Gynecology)  Maciel Sanchez MD as Consulting Physician (Gastroenterology)  Albin Kinney Jr., MD as Consulting Physician (Hand Surgery)  Martín Pinedo MD as Consulting Physician (Dermatology)  Reynaldo Simons MD as Consulting Physician (General Surgery)

## 2021-08-20 ENCOUNTER — OFFICE VISIT (OUTPATIENT)
Dept: INTERNAL MEDICINE | Facility: CLINIC | Age: 71
End: 2021-08-20

## 2021-08-20 ENCOUNTER — TELEPHONE (OUTPATIENT)
Dept: INTERNAL MEDICINE | Facility: CLINIC | Age: 71
End: 2021-08-20

## 2021-08-20 VITALS
BODY MASS INDEX: 33.49 KG/M2 | OXYGEN SATURATION: 98 % | DIASTOLIC BLOOD PRESSURE: 82 MMHG | HEART RATE: 62 BPM | HEIGHT: 68 IN | WEIGHT: 221 LBS | SYSTOLIC BLOOD PRESSURE: 134 MMHG

## 2021-08-20 DIAGNOSIS — I10 ESSENTIAL HYPERTENSION: Chronic | ICD-10-CM

## 2021-08-20 DIAGNOSIS — M85.89 OSTEOPENIA OF MULTIPLE SITES: Chronic | ICD-10-CM

## 2021-08-20 DIAGNOSIS — E04.2 NON-TOXIC MULTINODULAR GOITER: Chronic | ICD-10-CM

## 2021-08-20 DIAGNOSIS — I65.23 ATHEROSCLEROSIS OF BOTH CAROTID ARTERIES: Chronic | ICD-10-CM

## 2021-08-20 DIAGNOSIS — R73.01 IMPAIRED FASTING GLUCOSE: Chronic | ICD-10-CM

## 2021-08-20 DIAGNOSIS — E55.9 VITAMIN D DEFICIENCY: Chronic | ICD-10-CM

## 2021-08-20 DIAGNOSIS — E78.2 MIXED HYPERLIPIDEMIA: Chronic | ICD-10-CM

## 2021-08-20 DIAGNOSIS — M79.89 RIGHT LEG SWELLING: ICD-10-CM

## 2021-08-20 DIAGNOSIS — Z00.00 MEDICARE ANNUAL WELLNESS VISIT, SUBSEQUENT: Primary | ICD-10-CM

## 2021-08-20 DIAGNOSIS — Z78.0 MENOPAUSE: Chronic | ICD-10-CM

## 2021-08-20 PROCEDURE — G0439 PPPS, SUBSEQ VISIT: HCPCS | Performed by: INTERNAL MEDICINE

## 2021-08-20 PROCEDURE — 99397 PER PM REEVAL EST PAT 65+ YR: CPT | Performed by: INTERNAL MEDICINE

## 2021-08-20 PROCEDURE — 1160F RVW MEDS BY RX/DR IN RCRD: CPT | Performed by: INTERNAL MEDICINE

## 2021-08-20 PROCEDURE — 1125F AMNT PAIN NOTED PAIN PRSNT: CPT | Performed by: INTERNAL MEDICINE

## 2021-08-20 PROCEDURE — 99214 OFFICE O/P EST MOD 30 MIN: CPT | Performed by: INTERNAL MEDICINE

## 2021-08-20 PROCEDURE — G0444 DEPRESSION SCREEN ANNUAL: HCPCS | Performed by: INTERNAL MEDICINE

## 2021-08-20 PROCEDURE — 1170F FXNL STATUS ASSESSED: CPT | Performed by: INTERNAL MEDICINE

## 2021-08-20 PROCEDURE — 96160 PT-FOCUSED HLTH RISK ASSMT: CPT | Performed by: INTERNAL MEDICINE

## 2021-08-20 PROCEDURE — 93000 ELECTROCARDIOGRAM COMPLETE: CPT | Performed by: INTERNAL MEDICINE

## 2021-08-20 PROCEDURE — 99497 ADVNCD CARE PLAN 30 MIN: CPT | Performed by: INTERNAL MEDICINE

## 2021-08-20 RX ORDER — AMLODIPINE BESYLATE 5 MG/1
5 TABLET ORAL DAILY
Qty: 90 TABLET | Refills: 3 | Status: SHIPPED | OUTPATIENT
Start: 2021-08-20 | End: 2022-08-25 | Stop reason: SDUPTHER

## 2021-08-20 RX ORDER — LISINOPRIL 20 MG/1
20 TABLET ORAL DAILY
Qty: 90 TABLET | Refills: 3 | Status: SHIPPED | OUTPATIENT
Start: 2021-08-20 | End: 2022-08-25 | Stop reason: SDUPTHER

## 2021-08-20 NOTE — TELEPHONE ENCOUNTER
Patient had an appointment today with Dr Godwin and was ordered 2 tests but the patient stated she was supposed to have 3 tests ordered.    She is requesting an ultrasound on her right leg she stated this was discussed       Please advise

## 2021-08-26 ENCOUNTER — HOSPITAL ENCOUNTER (OUTPATIENT)
Dept: CARDIOLOGY | Facility: HOSPITAL | Age: 71
Discharge: HOME | End: 2021-08-26
Admitting: INTERNAL MEDICINE

## 2021-08-26 ENCOUNTER — APPOINTMENT (OUTPATIENT)
Dept: ULTRASOUND IMAGING | Facility: HOSPITAL | Age: 71
End: 2021-08-26

## 2021-08-26 DIAGNOSIS — M79.89 RIGHT LEG SWELLING: ICD-10-CM

## 2021-08-26 LAB
BH CV LOWER VASCULAR LEFT COMMON FEMORAL AUGMENT: NORMAL
BH CV LOWER VASCULAR LEFT COMMON FEMORAL COMPRESS: NORMAL
BH CV LOWER VASCULAR LEFT COMMON FEMORAL PHASIC: NORMAL
BH CV LOWER VASCULAR LEFT COMMON FEMORAL SPONT: NORMAL
BH CV LOWER VASCULAR RIGHT COMMON FEMORAL AUGMENT: NORMAL
BH CV LOWER VASCULAR RIGHT COMMON FEMORAL COMPRESS: NORMAL
BH CV LOWER VASCULAR RIGHT COMMON FEMORAL PHASIC: NORMAL
BH CV LOWER VASCULAR RIGHT COMMON FEMORAL SPONT: NORMAL
BH CV LOWER VASCULAR RIGHT DISTAL FEMORAL COMPRESS: NORMAL
BH CV LOWER VASCULAR RIGHT GASTRONEMIUS COMPRESS: NORMAL
BH CV LOWER VASCULAR RIGHT GREATER SAPH AK COMPRESS: NORMAL
BH CV LOWER VASCULAR RIGHT GREATER SAPH BK COMPRESS: NORMAL
BH CV LOWER VASCULAR RIGHT LESSER SAPH COMPRESS: NORMAL
BH CV LOWER VASCULAR RIGHT MID FEMORAL AUGMENT: NORMAL
BH CV LOWER VASCULAR RIGHT MID FEMORAL COMPRESS: NORMAL
BH CV LOWER VASCULAR RIGHT MID FEMORAL PHASIC: NORMAL
BH CV LOWER VASCULAR RIGHT MID FEMORAL SPONT: NORMAL
BH CV LOWER VASCULAR RIGHT PERONEAL COMPRESS: NORMAL
BH CV LOWER VASCULAR RIGHT POPLITEAL AUGMENT: NORMAL
BH CV LOWER VASCULAR RIGHT POPLITEAL COMPRESS: NORMAL
BH CV LOWER VASCULAR RIGHT POPLITEAL PHASIC: NORMAL
BH CV LOWER VASCULAR RIGHT POPLITEAL SPONT: NORMAL
BH CV LOWER VASCULAR RIGHT POSTERIOR TIBIAL COMPRESS: NORMAL
BH CV LOWER VASCULAR RIGHT PROFUNDA FEMORAL COMPRESS: NORMAL
BH CV LOWER VASCULAR RIGHT PROXIMAL FEMORAL COMPRESS: NORMAL
BH CV LOWER VASCULAR RIGHT SAPHENOFEMORAL JUNCTION COMPRESS: NORMAL
MAXIMAL PREDICTED HEART RATE: 148 BPM
STRESS TARGET HR: 126 BPM

## 2021-08-26 PROCEDURE — 93971 EXTREMITY STUDY: CPT | Performed by: INTERNAL MEDICINE

## 2021-08-26 PROCEDURE — 93971 EXTREMITY STUDY: CPT

## 2021-08-27 ENCOUNTER — TELEPHONE (OUTPATIENT)
Dept: INTERNAL MEDICINE | Facility: CLINIC | Age: 71
End: 2021-08-27

## 2021-08-27 NOTE — TELEPHONE ENCOUNTER
----- Message from Lisset Godwin MD sent at 8/26/2021  8:38 PM EDT -----  No blood clot in the right leg. Recommend leg elevation and compression stockings as discussed in the office. Also ensure low sodium diet (< 2000mg/day and ideally < 1500mg/day)

## 2021-08-27 NOTE — PROGRESS NOTES
No blood clot in the right leg. Recommend leg elevation and compression stockings as discussed in the office. Also ensure low sodium diet (< 2000mg/day and ideally < 1500mg/day)

## 2021-08-30 ENCOUNTER — HOSPITAL ENCOUNTER (OUTPATIENT)
Dept: ULTRASOUND IMAGING | Facility: HOSPITAL | Age: 71
Discharge: HOME | End: 2021-08-30
Admitting: INTERNAL MEDICINE

## 2021-08-30 DIAGNOSIS — E04.2 NON-TOXIC MULTINODULAR GOITER: Chronic | ICD-10-CM

## 2021-08-30 PROCEDURE — 76536 US EXAM OF HEAD AND NECK: CPT

## 2021-08-31 ENCOUNTER — TELEPHONE (OUTPATIENT)
Dept: INTERNAL MEDICINE | Facility: CLINIC | Age: 71
End: 2021-08-31

## 2021-08-31 NOTE — TELEPHONE ENCOUNTER
----- Message from Lisset Godwin MD sent at 8/30/2021  9:49 PM EDT -----  Thyroid ultrasound shows stable multiple nodules on the thyroid, unchanged from ultrasound in 2019. Consider repeating in 2 years

## 2021-08-31 NOTE — PROGRESS NOTES
Thyroid ultrasound shows stable multiple nodules on the thyroid, unchanged from ultrasound in 2019. Consider repeating in 2 years

## 2021-10-18 NOTE — PROGRESS NOTES
"Chief Complaint   Patient presents with   • Ankle Pain   • Foot Swelling       History of Present Illness  71 y.o.  woman presents for further evaluation of persistent left ankle pain and swelling and left foot pain. Ongoing for months. Reports left heel pain going under the foot arch, symptomatic when 1st getting up in the morning or after getting up from seated position. Gets better with walking. Reports increased ankle swelling by end of day and then associated discomfort despite wearing compression stockings. Reports leg swelling despite stockings. Both ankles get swollen but more pain in the left side. Wondering about getting xray. Reports remote h/o hairline fx in left foot (> 30 yrs ago).     Asks about whether to cont ASA based on recent media hype about stopping ASA.    Wants to review again my prev rec to start chol med. Notes sister has cholesterol, mother and MGM had strokes. MGF had MI. Also has cousins with vascular disease.    Review of Systems  ROS (+) for bilat ankle pain/swelling and left foot pain as noted. Denies numbness/tingling, falls. All other ROS reviewed and negative.    Current Outpatient Medications:   •  amLODIPine (NORVASC) 5 MG QD  •  aspirin 81 MG QD   •  Cholecalciferol (VITAMIN D) 2000 units QD  •  Coenzyme Q10 (COQ-10) 200 MG QD  •  lisinopril (PRINIVIL,ZESTRIL) 20 MG QD  •  Multiple Vitamin QD  •  Zinc Sulfate (ZINC 15 PO) QD      VITALS:  /80   Pulse 64   Ht 172.7 cm (68\")   Wt 99.3 kg (219 lb)   SpO2 99%   BMI 33.30 kg/m²     Physical Exam  Vitals and nursing note reviewed.   Constitutional:       General: She is not in acute distress.     Appearance: Normal appearance.   Eyes:      Extraocular Movements: Extraocular movements intact.      Conjunctiva/sclera: Conjunctivae normal.   Cardiovascular:      Rate and Rhythm: Normal rate and regular rhythm.      Heart sounds: Normal heart sounds.   Pulmonary:      Effort: Pulmonary effort is normal. No respiratory " distress.      Breath sounds: Normal breath sounds.   Abdominal:      General: Bowel sounds are normal.      Palpations: Abdomen is soft.   Musculoskeletal:         General: Tenderness (from left foot heel/arch area to the arch, not reproducible with palpation, no assoc'd erythema, swelling, or warmth) present.      Right lower leg: Edema (tr ankle edema bilaterally, nonpitting; BLE varicose/spider veins) present.      Left lower leg: Edema present.   Neurological:      Mental Status: She is alert and oriented to person, place, and time. Mental status is at baseline.   Psychiatric:         Mood and Affect: Mood normal.         Behavior: Behavior normal.         LABS  8/26/21 nl RLE venous duplex    8/21 A1C 5.6    ASSESSMENT/PLAN    Diagnoses and all orders for this visit:    1. Plantar fasciitis of left foot (Primary)  Assessment & Plan:  Cont toe-scratching or ball-rolling exercises before starting to walk; rec consideration for custom orthotics; discussed importance of good arch support in her shoes; if no better, rec podiatry consultation; discussed low yield from xray; likely shows some degenerative changes; sxs have been going on for months-1yr so even if she had recurrent hairline fx, no intervention could be done at this time      2. Bilateral edema of lower extremity  Assessment & Plan:  rec getting new compression stockings that give tighter compression; discussed for some patients, the thigh-high compression is more comfortable than the ones that go to below the knees; rec leg elevation and low Na diet; f/u prn      3. Hypertension  Assessment & Plan:  BP very elevated today; she attributes it to coming to our office today and assoc'd ankle/left foot pain;  cont amlo 5mg QD and lisin 20mg QD with goal < 130/80 and start home monitoring regularly 2-3x/week; f/u if consistently > 140/90 and will f/u at the latest in 4 mos      4. Impaired fasting glucose  Assessment & Plan:  F/u in 4 mos with next  A1C    Orders:  -     Hemoglobin A1c; Future    5. Atherosclerosis of both carotid arteries  Assessment & Plan:  Discussed this is the indication that she should cont ASA 81mg QD; she does not have extensive bruising or other side effects with ASA 81mg QD, which she has taken for years; note she will also be starting on atorvastatin 10mg QD with  and recommended LDL < 100      6. Hyperlipidemia  Assessment & Plan:  Most recent  with goal LDL < 100 due to carotid atherosclerosis; note also multiple family members with MI, CVA, hyperlipidemia;  takes atorvastatin 80mg QD but has had heart issues; reviewed med options, goals, side effects, and risks; patient will proceed with atorvastatin 10mg QD #30, 3RF; reviewed benefits, goals, and how to take correctly; report back if any concerns or side effects, otherwise, decrease saturated fats and cholesterol in the diet; repeat lipids with LFTs and CPK in 3 mos        Orders:  -     atorvastatin (LIPITOR) 10 MG tablet; Take 1 tablet by mouth Daily.  Dispense: 30 tablet; Refill: 3  -     Lipid Panel; Future  -     Hepatic Function Panel; Future  -     CK; Future    Time Documentation    Counseled patient  I spent 28 minutes face to face and 17 minutes non-face to face on today's office visit. Time was spent reviewing patient's previous notes, lab results, test results, vitals, and/or other records as well as examining the patient, providing counseling, coordinating care, answering questions, discussing evaluation and treatment plans, and writing this note.    Total time: 45 minutes      FOLLOW-UP  1. Health maintenance - COVID19 vacc done, including Pfizer dose #3; flu vacc done 10/13/21  2. RTC 3 mos with A1C, lipids, LFTs, CPK (escribed) after starting atorvastatin 10mg QD; also BP check    Electronically signed by:    Lisset Godwin MD, FACP  10/19/2021

## 2021-10-18 NOTE — ASSESSMENT & PLAN NOTE
BP very elevated today; she attributes it to coming to our office today and assoc'd ankle/left foot pain;  cont amlo 5mg QD and lisin 20mg QD with goal < 130/80 and start home monitoring regularly 2-3x/week; f/u if consistently > 140/90 and will f/u at the latest in 4 mos

## 2021-10-19 ENCOUNTER — OFFICE VISIT (OUTPATIENT)
Dept: INTERNAL MEDICINE | Facility: CLINIC | Age: 71
End: 2021-10-19

## 2021-10-19 VITALS
DIASTOLIC BLOOD PRESSURE: 80 MMHG | SYSTOLIC BLOOD PRESSURE: 160 MMHG | BODY MASS INDEX: 33.19 KG/M2 | OXYGEN SATURATION: 99 % | HEART RATE: 64 BPM | HEIGHT: 68 IN | WEIGHT: 219 LBS

## 2021-10-19 DIAGNOSIS — I10 ESSENTIAL HYPERTENSION: Chronic | ICD-10-CM

## 2021-10-19 DIAGNOSIS — R73.01 IMPAIRED FASTING GLUCOSE: Chronic | ICD-10-CM

## 2021-10-19 DIAGNOSIS — R60.0 BILATERAL EDEMA OF LOWER EXTREMITY: ICD-10-CM

## 2021-10-19 DIAGNOSIS — M72.2 PLANTAR FASCIITIS OF LEFT FOOT: Primary | Chronic | ICD-10-CM

## 2021-10-19 DIAGNOSIS — I65.23 ATHEROSCLEROSIS OF BOTH CAROTID ARTERIES: Chronic | ICD-10-CM

## 2021-10-19 DIAGNOSIS — E78.2 MIXED HYPERLIPIDEMIA: Chronic | ICD-10-CM

## 2021-10-19 PROBLEM — Z00.00 MEDICARE ANNUAL WELLNESS VISIT, SUBSEQUENT: Chronic | Status: ACTIVE | Noted: 2017-01-31

## 2021-10-19 PROCEDURE — 99215 OFFICE O/P EST HI 40 MIN: CPT | Performed by: INTERNAL MEDICINE

## 2021-10-19 RX ORDER — ATORVASTATIN CALCIUM 10 MG/1
10 TABLET, FILM COATED ORAL DAILY
Qty: 30 TABLET | Refills: 3 | Status: SHIPPED | OUTPATIENT
Start: 2021-10-19 | End: 2022-01-27 | Stop reason: SDUPTHER

## 2021-10-20 NOTE — ASSESSMENT & PLAN NOTE
Discussed this is the indication that she should cont ASA 81mg QD; she does not have extensive bruising or other side effects with ASA 81mg QD, which she has taken for years; note she will also be starting on atorvastatin 10mg QD with  and recommended LDL < 100   with patient

## 2021-10-20 NOTE — ASSESSMENT & PLAN NOTE
Cont toe-scratching or ball-rolling exercises before starting to walk; rec consideration for custom orthotics; discussed importance of good arch support in her shoes; if no better, rec podiatry consultation; discussed low yield from xray; likely shows some degenerative changes; sxs have been going on for months-1yr so even if she had recurrent hairline fx, no intervention could be done at this time

## 2021-10-20 NOTE — ASSESSMENT & PLAN NOTE
Most recent  with goal LDL < 100 due to carotid atherosclerosis; note also multiple family members with MI, CVA, hyperlipidemia;  takes atorvastatin 80mg QD but has had heart issues; reviewed med options, goals, side effects, and risks; patient will proceed with atorvastatin 10mg QD #30, 3RF; reviewed benefits, goals, and how to take correctly; report back if any concerns or side effects, otherwise, decrease saturated fats and cholesterol in the diet; repeat lipids with LFTs and CPK in 3 mos

## 2021-10-20 NOTE — ASSESSMENT & PLAN NOTE
rec getting new compression stockings that give tighter compression; discussed for some patients, the thigh-high compression is more comfortable than the ones that go to below the knees; rec leg elevation and low Na diet; f/u prn

## 2021-11-18 ENCOUNTER — HOSPITAL ENCOUNTER (OUTPATIENT)
Dept: BONE DENSITY | Facility: HOSPITAL | Age: 71
Discharge: HOME | End: 2021-11-18
Admitting: INTERNAL MEDICINE

## 2021-11-18 DIAGNOSIS — Z78.0 MENOPAUSE: Chronic | ICD-10-CM

## 2021-11-18 PROCEDURE — 77080 DXA BONE DENSITY AXIAL: CPT

## 2021-11-20 NOTE — PROGRESS NOTES
Dear Rosalie,    Thank you for obtaining your DEXA (bone density) scan.  I have received those results and would like to review them with you.      Your bone density remains in the osteopenic range.  This is between normal and osteoporosis.      Please maintain regular calcium and vitamin D supplementation.  Calcium intake should be 1000mg per day between diet and supplements.  Weight bearing exercise is good for bone health as well.    We should plan to repeat your DEXA in 3 years.  Please let me know if you have any questions or concerns regarding these results.        Sincerely,  Lisset Godwin MD, FACP

## 2022-01-20 ENCOUNTER — TELEMEDICINE (OUTPATIENT)
Dept: INTERNAL MEDICINE | Facility: CLINIC | Age: 72
End: 2022-01-20

## 2022-01-20 VITALS — SYSTOLIC BLOOD PRESSURE: 130 MMHG | DIASTOLIC BLOOD PRESSURE: 80 MMHG

## 2022-01-20 DIAGNOSIS — E78.2 MIXED HYPERLIPIDEMIA: Primary | Chronic | ICD-10-CM

## 2022-01-20 DIAGNOSIS — I10 ESSENTIAL HYPERTENSION: Chronic | ICD-10-CM

## 2022-01-20 DIAGNOSIS — Z71.9 COUNSELING, UNSPECIFIED: ICD-10-CM

## 2022-01-20 DIAGNOSIS — R73.01 IMPAIRED FASTING GLUCOSE: Chronic | ICD-10-CM

## 2022-01-20 PROCEDURE — 99443 PR PHYS/QHP TELEPHONE EVALUATION 21-30 MIN: CPT | Performed by: INTERNAL MEDICINE

## 2022-01-20 NOTE — ASSESSMENT & PLAN NOTE
Patient admits to stress eating over the holidays and with family issues; will update A1C with upcoming labs

## 2022-01-20 NOTE — ASSESSMENT & PLAN NOTE
Yokastad BPs 120-140s/70-80s on lisin 20mg QAM and amlo 5mg QPM; she will verify accuracy of her cuff when she comes for labs; if accurate, then plan to increase lisin to 40mg QD

## 2022-01-20 NOTE — PROGRESS NOTES
Chief Complaint   Patient presents with   • Hyperlipidemia   • Hypertension     You have chosen to receive care through a telephone visit. Do you consent to use a telephone visit for your medical care today? Yes    History of Present Illness  71 y.o.  woman presents by telephone for cholesterol after starting atorvastatin as well as discussed re: her BPs and COVID concerns. Has been checking BPs 2-3x/day and averaging 140-150s/70-80s in the morning before lisinopril, mid-day 120-130s/70-80s,nand then back to 140-150s/70-80s late afternoon or evening before dose of amlodipine. Wants to make sure her machine is reading correctly.    Has been tolerating atorvastatin without side effect.    Reports a rash on face below the nose that developed over the weekend; was itchy. It is getting better and she attributes the rash to a facial moisturizer or mask irritation.    Has questions where to get COVID testing if needed. Has questions re: whether she really needs to get N95 mask. Has been using disposable mask with [employer] cloth mask, the latter of which has a fairly good seal around her face. Is not symptomatic with cold sxs currently.    Review of Systems  ROS (+) for rash around nose, resolving. Denies CP, palpitations, SOB, myalgias. All other ROS reviewed and negative.    Current Outpatient Medications:   •  amLODIPine (NORVASC) 5 MG QD  •  aspirin 81 MG QD  •  atorvastatin (LIPITOR) 10 MG QD  •  Cholecalciferol (VITAMIN D) 2000 units QD  •  Coenzyme Q10 (COQ-10) 200 MG QD  •  lisinopril (PRINIVIL,ZESTRIL) 20 MG QD  •  Multiple Vitamin QD  •  Zinc Sulfate QD    VITALS:  /80 per patient    Physical Exam  Constitutional:       General: She is not in acute distress.  Pulmonary:      Effort: Pulmonary effort is normal.      Comments: Speaks in complete sentences  Neurological:      Mental Status: She is alert.   Psychiatric:         Mood and Affect: Mood normal.         Behavior: Behavior normal.          LABS  8/21 A1C 5.6,     ASSESSMENT/PLAN    Diagnoses and all orders for this visit:    1. Hyperlipidemia (Primary)  Assessment & Plan:  Needs f/u lipids with LFTs and CPK after starting atorvastatin 10mg QD with goal LDL < 100, ideally < 70      2. Hypertension  Assessment & Plan:  Bord BPs 120-140s/70-80s on lisin 20mg QAM and amlo 5mg QPM; she will verify accuracy of her cuff when she comes for labs; if accurate, then plan to increase lisin to 40mg QD      3. Impaired fasting glucose  Assessment & Plan:  Patient admits to stress eating over the holidays and with family issues; will update A1C with upcoming labs      4. Counseling re: COVID  Comments:  for testing for sxs/exposure, either video visit and come to office for testing or go to [facility]; reviewed masking recommendations and infection control precautions   - also gave her website info to sign up for free COVID tests to be mailed to us by the gov't but reviewed limitations and possibility of false negatives with home testing (she will have her daughter help her sign up)    TOTAL TELEPHONE TIME: 18 minutes  TOTAL TIME SPENT ON VISIT: 30 minutes  This visit was audio only and had no technical problems.      FOLLOW-UP  1. She will drop by for fasting labs (prev escribed A1C, lipids, LFTs, CPK)  2. She can have nurse check her BP cuff accuracy when she comes for labs  3. RTC prn; next wellness scheduled 8/25/22; fasting labs the week prior to appt (CBC, CMP, TSH, lipids, UA/micro, microalb, vit D, A1C, CPK)      Electronically signed by:    Lisset Godwin MD, FACP  01/20/2022

## 2022-01-20 NOTE — ASSESSMENT & PLAN NOTE
Needs f/u lipids with LFTs and CPK after starting atorvastatin 10mg QD with goal LDL < 100, ideally < 70

## 2022-01-25 ENCOUNTER — LAB (OUTPATIENT)
Dept: LAB | Facility: HOSPITAL | Age: 72
End: 2022-01-25

## 2022-01-25 DIAGNOSIS — R73.01 IMPAIRED FASTING GLUCOSE: Chronic | ICD-10-CM

## 2022-01-25 DIAGNOSIS — E78.2 MIXED HYPERLIPIDEMIA: Chronic | ICD-10-CM

## 2022-01-26 LAB
ALBUMIN SERPL-MCNC: 4 G/DL (ref 3.5–5.2)
ALP SERPL-CCNC: 70 U/L (ref 39–117)
ALT SERPL-CCNC: 16 U/L (ref 1–33)
AST SERPL-CCNC: 16 U/L (ref 1–32)
BILIRUB DIRECT SERPL-MCNC: 0.2 MG/DL (ref 0–0.3)
BILIRUB SERPL-MCNC: 0.5 MG/DL (ref 0–1.2)
CHOLEST SERPL-MCNC: 142 MG/DL (ref 0–200)
CK SERPL-CCNC: 55 U/L (ref 20–180)
HBA1C MFR BLD: 5.6 % (ref 4.8–5.6)
HDLC SERPL-MCNC: 53 MG/DL (ref 40–60)
LDLC SERPL CALC-MCNC: 73 MG/DL (ref 0–100)
PROT SERPL-MCNC: 6.6 G/DL (ref 6–8.5)
TRIGL SERPL-MCNC: 86 MG/DL (ref 0–150)
VLDLC SERPL CALC-MCNC: 16 MG/DL (ref 5–40)

## 2022-01-27 DIAGNOSIS — E78.2 MIXED HYPERLIPIDEMIA: Chronic | ICD-10-CM

## 2022-01-27 RX ORDER — ATORVASTATIN CALCIUM 10 MG/1
10 TABLET, FILM COATED ORAL DAILY
Qty: 90 TABLET | Refills: 2 | Status: SHIPPED | OUTPATIENT
Start: 2022-01-27 | End: 2022-08-25 | Stop reason: SDUPTHER

## 2022-03-29 ENCOUNTER — TELEPHONE (OUTPATIENT)
Dept: INTERNAL MEDICINE | Facility: CLINIC | Age: 72
End: 2022-03-29

## 2022-04-03 NOTE — PROGRESS NOTES
"Chief Complaint   Patient presents with   • Foot Pain     Right foot     • Foot Swelling   • Numbness       History of Present Illness  71 y.o.  woman presents for further evaluation of right foot pain. Reports swelling at both sides of the ankle but no significant pain. Reports pain at the inner ankle towards the front, radiating down to the top of the foot and occ up the inner left lower leg. Pain worsened with specific movements; ankle swelling decreased in the AM and is worsened by end of day. Patient requesting ankle and foot xray. Notes remote h/o stress fx.    Reports wearing compression stockings. Reports persistent R>L swelling but we already did u/s in 8/21 to r/o DVT.     Nervous about COVID19 but excited about going to granddaughter's graduation in 5/22.     Review of Systems  ROS (+) for right foot pain with chronic right leg swelling and lat/medial right ankle swelling as above. Denies numbness/tingling.  All other ROS reviewed and negative.    Current Outpatient Medications:   •  amLODIPine (NORVASC) 5 MG QD  •  aspirin 81 MG QD  •  atorvastatin (LIPITOR) 10 MG QD  •  Cholecalciferol (VITAMIN D) 2000 units QD  •  Coenzyme Q10 (COQ-10) 200 MG QD  •  lisinopril 20 MG QD  •  Multiple Vitamin QD  •  Zinc Sulfate QD    VITALS:  /78   Pulse 65   Ht 172.7 cm (68\")   Wt 101 kg (223 lb)   SpO2 98%   BMI 33.91 kg/m²     Physical Exam  Vitals and nursing note reviewed.   Constitutional:       General: She is not in acute distress.     Appearance: Normal appearance. She is not ill-appearing.   Eyes:      Extraocular Movements: Extraocular movements intact.      Conjunctiva/sclera: Conjunctivae normal.   Cardiovascular:      Comments: Spider veins bilat lower legs and around ankles R>L; no pedal edema bilaterally    2+ DP pulses bilaterally  Pulmonary:      Effort: Pulmonary effort is normal. No respiratory distress.   Musculoskeletal:         General: Swelling (lateral and medial ankle " swelling without warmth, erythema, or tend with palpation) present.      Right lower leg: Edema (R>L) present.      Left lower leg: Edema present.      Comments: Right ankle FROM, minimal reproducible pain with plantar flexion; nontender with dorsiflexion, eversion, or inversion   Neurological:      Mental Status: She is alert and oriented to person, place, and time. Mental status is at baseline.   Psychiatric:         Mood and Affect: Mood normal.         Behavior: Behavior normal.         LABS  Results for orders placed or performed in visit on 01/25/22   Hemoglobin A1c    Specimen: Blood    Blood  Release to stef   Result Value Ref Range    Hemoglobin A1C 5.60 4.80 - 5.60 %   CK    Specimen: Blood    Blood  Release to stef   Result Value Ref Range    Creatine Kinase 55 20 - 180 U/L   Hepatic Function Panel    Specimen: Blood    Blood  Release to stef   Result Value Ref Range    Total Protein 6.6 6.0 - 8.5 g/dL    Albumin 4.00 3.50 - 5.20 g/dL    Total Bilirubin 0.5 0.0 - 1.2 mg/dL    Bilirubin, Direct 0.2 0.0 - 0.3 mg/dL    Alkaline Phosphatase 70 39 - 117 U/L    AST (SGOT) 16 1 - 32 U/L    ALT (SGPT) 16 1 - 33 U/L   Lipid Panel    Specimen: Blood    Blood  Release to stef   Result Value Ref Range    Total Cholesterol 142 0 - 200 mg/dL    Triglycerides 86 0 - 150 mg/dL    HDL Cholesterol 53 40 - 60 mg/dL    VLDL Cholesterol Tima 16 5 - 40 mg/dL    LDL Chol Calc (NIH) 73 0 - 100 mg/dL     8/26/21 RLE venous duplex - neg for DVT    ASSESSMENT/PLAN    Diagnoses and all orders for this visit:    1. Right foot pain (Primary)  Comments:  R foot/ankle swelling, likely multifactorial w/ venous insuffic and ligament/tendon strain; check R. foot/ankle xray; rec venous duplex u/s  Orders:  -     XR Ankle 3+ View Right; Future  -     XR Foot 3+ View Right; Future    2. Right leg swelling  Comments:  prev no DVT and swelling unchanged; cont compression stockings; eval for venous insufficiency and consider vasc surg  consultation  Orders:  -     XR Ankle 3+ View Right; Future  -     XR Foot 3+ View Right; Future  -     Duplex Venous Lower Extremity - Right CAR; Future    3. Hypertension  Assessment & Plan:  BP elevated 140/78; poss exac'd by current right foot pain; cont amlo 5mg QD and lisin 20mg QD and rec home monitoring      4. Vaccine counseling  Comments:  rec 4th dose COVID19 booster per recent CDC updated guidelines      FOLLOW-UP  1. Health maintenance - COVID19 vacc done, incl 3rd dose booster; counseling to proceed with 4th dose booster per recent CDC guidelines; reviewed also colon CA screening recs (last colonosc 2/17, recommended to repeat 2022 per Dr. Sanchez) - she wants to pursue this after graduation in May 2022  2. RTC for next wellness 8/25/22; fasting labs prior to appt (CBC, CMP, TSH, lipids, UA/micr, microalb, vit D, A1C, CPK)    Electronically signed by:    Lsiset Godwin MD, FACP  04/04/2022

## 2022-04-03 NOTE — ASSESSMENT & PLAN NOTE
BP elevated 140/78; poss exac'd by current right foot pain; cont amlo 5mg QD and lisin 20mg QD and rec home monitoring

## 2022-04-04 ENCOUNTER — OFFICE VISIT (OUTPATIENT)
Dept: INTERNAL MEDICINE | Facility: CLINIC | Age: 72
End: 2022-04-04

## 2022-04-04 VITALS
WEIGHT: 223 LBS | HEART RATE: 65 BPM | BODY MASS INDEX: 33.8 KG/M2 | DIASTOLIC BLOOD PRESSURE: 78 MMHG | HEIGHT: 68 IN | OXYGEN SATURATION: 98 % | SYSTOLIC BLOOD PRESSURE: 140 MMHG

## 2022-04-04 DIAGNOSIS — M79.671 RIGHT FOOT PAIN: Primary | ICD-10-CM

## 2022-04-04 DIAGNOSIS — M79.89 RIGHT LEG SWELLING: ICD-10-CM

## 2022-04-04 DIAGNOSIS — Z71.85 VACCINE COUNSELING: ICD-10-CM

## 2022-04-04 DIAGNOSIS — I10 ESSENTIAL HYPERTENSION: Chronic | ICD-10-CM

## 2022-04-04 PROCEDURE — 99214 OFFICE O/P EST MOD 30 MIN: CPT | Performed by: INTERNAL MEDICINE

## 2022-04-05 ENCOUNTER — HOSPITAL ENCOUNTER (OUTPATIENT)
Dept: GENERAL RADIOLOGY | Facility: HOSPITAL | Age: 72
Discharge: HOME | End: 2022-04-05
Admitting: INTERNAL MEDICINE

## 2022-04-05 DIAGNOSIS — M79.89 RIGHT LEG SWELLING: ICD-10-CM

## 2022-04-05 DIAGNOSIS — M79.671 RIGHT FOOT PAIN: ICD-10-CM

## 2022-04-05 PROCEDURE — 73630 X-RAY EXAM OF FOOT: CPT

## 2022-04-05 PROCEDURE — 73610 X-RAY EXAM OF ANKLE: CPT

## 2022-04-07 ENCOUNTER — TELEPHONE (OUTPATIENT)
Dept: INTERNAL MEDICINE | Facility: CLINIC | Age: 72
End: 2022-04-07

## 2022-04-07 NOTE — TELEPHONE ENCOUNTER
----- Message from Lisset Godwin MD sent at 4/5/2022 10:02 PM EDT -----  Nonspecific ankle swelling, but ankle joint and rest of foot is normal without fractures. There is mild arthritis changes in tthe midfoot    Cont ice and elevate as discussed; await venous ultrasound to eval for vein circulation in leg contributing to leg swelling

## 2022-04-19 ENCOUNTER — TELEPHONE (OUTPATIENT)
Dept: INTERNAL MEDICINE | Facility: CLINIC | Age: 72
End: 2022-04-19

## 2022-04-19 NOTE — TELEPHONE ENCOUNTER
Caller: Rosalie Amador    Relationship: Self    Best call back number: [phone]  What form or medical record are you requesting: COPY OF XRAY FROM April 5    Who is requesting this form or medical record from you:PATIENT     How would you like to receive the form or medical records (pick-up, mail, fax): PICKUP  If fax, what is the fax number:   If mail, what is the address:   If pick-up, provide patient with address and location details    Timeframe paperwork needed: PICKUP TOMORROW IF POSSIBLE    Additional notes: SPECIALIST APPOINTMENT IS FOR Thursday AND NEEDING TO TAKE RESULTS TO APPOINTMENT    PLEASE CALL IF SHE CANNOT  TOMORROW 04/20/22

## 2022-08-21 DIAGNOSIS — I10 ESSENTIAL HYPERTENSION: Chronic | ICD-10-CM

## 2022-08-21 DIAGNOSIS — R73.01 IMPAIRED FASTING GLUCOSE: Chronic | ICD-10-CM

## 2022-08-22 ENCOUNTER — TELEPHONE (OUTPATIENT)
Dept: INTERNAL MEDICINE | Facility: CLINIC | Age: 72
End: 2022-08-22

## 2022-08-22 DIAGNOSIS — R73.01 IMPAIRED FASTING GLUCOSE: ICD-10-CM

## 2022-08-22 DIAGNOSIS — E78.2 MIXED HYPERLIPIDEMIA: ICD-10-CM

## 2022-08-22 DIAGNOSIS — E55.9 VITAMIN D DEFICIENCY: ICD-10-CM

## 2022-08-22 DIAGNOSIS — Z00.00 MEDICARE ANNUAL WELLNESS VISIT, SUBSEQUENT: Primary | ICD-10-CM

## 2022-08-22 DIAGNOSIS — I10 ESSENTIAL HYPERTENSION: ICD-10-CM

## 2022-08-22 RX ORDER — LISINOPRIL 20 MG/1
TABLET ORAL
Qty: 90 TABLET | Refills: 3 | OUTPATIENT
Start: 2022-08-22

## 2022-08-24 NOTE — ASSESSMENT & PLAN NOTE
Health maintenance - COVID19 vacc done, incl 4th dose booster (rec new COVID19 vacc on the fall); flu vacc 10/21 (annually in the fall); Prevnar 1/15, PVX 1/16, Td 5/20 (next Td due 2030); rec HAV - counseling given; Shingrix done; L. mammo due (last 8/25/21 [facility]) - pending next Tues 8/30/22 per pt; GYN care with Dr. Sweeney 5/22 w/ nl Pap; DEXA 11/21, repeat 2024; colonosc due (Last 2/17, repeat 2022 per Dr. Sanchez) - colon CA screening options reviewed with patient and she wants to proceed with colonosc with h/o polyps; eye exam with Dr. Cooper 10/21, pending 10/22; dental exam q6mos, done 3/22, next pending 9/22; (+)seat belt use    Consultants:  Patient Care Team:  Lisset Godwin MD as PCP - General  Lisset Godwin MD as PCP - Internal Medicine  Albert Cooper MD as Consulting Physician (Ophthalmology)  Keely Sweeney MD as Consulting Physician (Obstetrics and Gynecology)  Maciel Sanchez MD as Consulting Physician (Gastroenterology)  Albin Kinney Jr., MD as Consulting Physician (Hand Surgery)  Martín Pinedo MD as Consulting Physician (Dermatology)  Reynaldo Simons MD as Consulting Physician (General Surgery)  Umer Hanson MD as Consulting Physician (Vascular Surgery)

## 2022-08-24 NOTE — PROGRESS NOTES
ANNUAL WELLNESS VISIT    DRUG AND ALCOHOL USE      no alcohol use, no tobacco use and caffeine intake: 2 cups of caffeinated coffee per day    DIET AND PHYSICAL ACTIVITY     Diet: none at the moment due to right foot pain    Exercise: infrequently   Exercise Details: walking    MOOD DISORDER AND COGNITIVE SCREENING   Depression Screening Tool Used yes - see PHQ-9; components reviewed with patient; reports some days of feeling down, depressed, or hopeless, particularly with recent loss of loved ones; otherwise denies not having pleasure or interest in doing things.  Screening is negative for active depression.  Screening is positive for grieving.   Anxiety Screening Tool Used yes     AUDIT screening 0     Mini-Cog Performed   Yes    1. Tell Patient 3 Words car apple pen    2. Administer Clock Test normal    3. Recall 3 words  car apple pen    4. Number Correct Items 3    FUNCTIONAL ABILITY AND LEVEL OF SAFETY   Hearing no hearing loss     Wears Hearing Aids No       Current Activities Independent      none  - see Funct/Cog Status Intake     Fall Risk Assessment       Has difficulty with walking or balance  No         Timed Up and Go (TUG) Test  8 sec.       If >12 sec, normal    ADVANCED DIRECTIVE  Advance Care Planning   ACP discussion was held with the patient during this visit. Patient does not have an advance directive, information provided.  Advance Care Planning Discussion:  16 min or more spent on counseling; patient does not have advanced directive and living will.  Reviewed desires for end of life care, which is to have comfort care.  Patient does not want extraordinary life-sustaining measures, including no prolonged artificial life support. Encouraged patient to ensure family is aware of desires/preferences. Confirmed  is her healthcare surrogate. ACP pamphlet previously given to patient and she states she still has it. Florence reviewed the sections that need to be completed and implications.  Request that she bring back copy for our records once notarized.    PAIN SCREENING Do you have pain right now? yes      If so, 1-10 scale: 5     Increases w/activity.  Explain gets worse as the day goes on in right foot     Do you have pain every day? Yes      Probable chronic pain: No     Recent Hospitalizations:  No recent hospitalization(s)..     MEDICATION REVIEW   - updated and reviewed (see Medication List).   - reviewed for potentially harmful drug-disease interactions in the elderly.   - reviewed for high risk medications in the elderly.   - aspirin use: Yes, 81mg QD    BMI  Body mass index is 34.72 kg/m².  BMI is >= 30 and <35. (Class 1 Obesity). The following options were offered after discussion;: exercise counseling/recommendations and nutrition counseling/recommendations       _________________________________________________________    Chief Complaint   Patient presents with   • Medicare Wellness-subsequent   • Hyperlipidemia   • Hypertension       History of Present Illness  72 y.o.  woman presents for updated phys examination and wellness visit as well as f/u on cholesterol, sugars, and BP.     Reports persistent right foot pain at top of the foot. Has been seeing vascular surgery for leg swelling.  Reports compression stockings do decrease the swelling but do not resolve the pain.  States that she has right foot pain with any weightbearing.  Brings x-ray results for my review.  Is not sure of the meaning of plantar calcaneal enthesolisthesis.    Reports Rx for doxycycline for rosacea.  Only took 3 days because she subsequently developed spinning dizziness.  She states that this is her first bout of vertigo.  Symptoms resolved off of doxycycline.  She is not willing to retry the doxycycline.    Not checking blood pressures at home; plans to get a new blood pressure cuff    Review of Systems  Denies headaches, visual changes, CP, palpitations, SOB, cough, abd pain, n/v/d, difficulty with  "urination, numbness/tingling, falls, mood changes, lightheadedness, hearing changes, rashes.    ROS (+) for top of right foot pain at the navicular area. ROS (+) for BLE leg/ankle swelling, decreased with compression stockings.  Reports decreased exercise due to the right foot pain.    Denies vaginal discharge or bleeding (no periods) or breast concerns.   All other ROS reviewed and negative.    Current Outpatient Medications:   •  amLODIPine (NORVASC) 5 MG QD  •  aspirin 81 MG QD  •  atorvastatin (LIPITOR) 10 MG QD  •  Cholecalciferol (VITAMIN D) 2000 units QD  •  Coenzyme Q10 (COQ-10) 200 MG QD  •  lisinopril 20 MG QD  •  Multiple Vitamin QD  •  Zinc Sulfate (ZINC 15 PO) QD    VITALS:  /64 (BP Location: Left arm, Patient Position: Sitting)   Pulse 61   Ht 171.5 cm (67.5\")   Wt 102 kg (225 lb)   SpO2 99%   BMI 34.72 kg/m²     Physical Exam  Vitals and nursing note reviewed.   Constitutional:       General: She is not in acute distress.     Appearance: Normal appearance. She is well-developed.   HENT:      Head: Normocephalic.      Right Ear: Ear canal and external ear normal. There is impacted cerumen.      Left Ear: Tympanic membrane, ear canal and external ear normal.      Nose: Nose normal.   Eyes:      Extraocular Movements: Extraocular movements intact.      Conjunctiva/sclera: Conjunctivae normal.      Pupils: Pupils are equal, round, and reactive to light.   Neck:      Vascular: No carotid bruit (bilaterally).   Cardiovascular:      Rate and Rhythm: Normal rate and regular rhythm.      Heart sounds: Normal heart sounds.      Comments: Frequent ectopy  Pulmonary:      Effort: Pulmonary effort is normal. No respiratory distress.      Breath sounds: Normal breath sounds. No wheezing or rales.   Abdominal:      General: Bowel sounds are normal. There is no distension.      Palpations: Abdomen is soft. There is no mass.      Tenderness: There is no abdominal tenderness.   Genitourinary:     Comments: " Breast/pelvic exams deferred to GYN; has right mastectomy with well-healed scar; left breast unremarkable without masses, skin changes, or nipple discharge.    Musculoskeletal:         General: Tenderness (Tenderness at the right navicular bone area without erythema or worsened swelling) present.      Cervical back: Normal range of motion and neck supple.      Right lower leg: Edema (Mild pedal edema bilaterally) present.      Left lower leg: Edema present.   Lymphadenopathy:      Cervical: No cervical adenopathy.   Skin:     General: Skin is warm and dry.      Findings: No rash.   Neurological:      Mental Status: She is alert and oriented to person, place, and time.      Cranial Nerves: No cranial nerve deficit.      Deep Tendon Reflexes: Reflexes normal.   Psychiatric:         Mood and Affect: Mood normal.         Behavior: Behavior normal.           LABS  Results for orders placed or performed in visit on 08/25/22   POC Glycosylated Hemoglobin (Hb A1C)    Specimen: Blood   Result Value Ref Range    Hemoglobin A1C 5.8 %    Lot Number [id number]     Expiration Date 02/04/2024      Results for orders placed or performed in visit on 01/25/22   Hemoglobin A1c    Specimen: Blood    Blood  Release to stef   Result Value Ref Range    Hemoglobin A1C 5.60 4.80 - 5.60 %   CK    Specimen: Blood    Blood  Release to stef   Result Value Ref Range    Creatine Kinase 55 20 - 180 U/L   Hepatic Function Panel    Specimen: Blood    Blood  Release to stef   Result Value Ref Range    Total Protein 6.6 6.0 - 8.5 g/dL    Albumin 4.00 3.50 - 5.20 g/dL    Total Bilirubin 0.5 0.0 - 1.2 mg/dL    Bilirubin, Direct 0.2 0.0 - 0.3 mg/dL    Alkaline Phosphatase 70 39 - 117 U/L    AST (SGOT) 16 1 - 32 U/L    ALT (SGPT) 16 1 - 33 U/L   Lipid Panel    Specimen: Blood    Blood  Release to stef   Result Value Ref Range    Total Cholesterol 142 0 - 200 mg/dL    Triglycerides 86 0 - 150 mg/dL    HDL Cholesterol 53 40 - 60 mg/dL    VLDL Cholesterol  Tima 16 5 - 40 mg/dL    LDL Chol Calc (NIH) 73 0 - 100 mg/dL       ECG 12 Lead    Date/Time: 8/25/2022 9:00 AM  Performed by: Lisset Godwin MD  Authorized by: Lisset Godwin MD   Comparison: compared with previous ECG from 8/20/2021  Similar to previous ECG  Rhythm: sinus rhythm and sinus bradycardia  Rate: normal  BPM: 56  Conduction: conduction normal  ST Segments: ST segments normal  T Waves: T waves normal  QRS axis: normal  Clinical impression comment: stable EKG            ASSESSMENT/PLAN    Diagnoses and all orders for this visit:    1. Medicare annual wellness visit, subsequent (Primary)  Assessment & Plan:  Health maintenance - COVID19 vacc done, incl 4th dose booster (rec new COVID19 vacc on the fall); flu vacc 10/21 (annually in the fall); Prevnar 1/15, PVX 1/16, Td 5/20 (next Td due 2030); rec HAV - counseling given; Shingrix done; L. mammo due (last 8/25/21 [facility]) - pending next Tues 8/30/22 per pt; GYN care with Dr. Sweeney 5/22 w/ nl Pap; DEXA 11/21, repeat 2024; colonosc due (Last 2/17, repeat 2022 per Dr. Sanchez) - colon CA screening options reviewed with patient and she wants to proceed with colonosc with h/o polyps; eye exam with Dr. Cooper 10/21, pending 10/22; dental exam q6mos, done 3/22, next pending 9/22; (+)seat belt use    Consultants:  Patient Care Team:  Lisset Godwin MD as PCP - General  Lisset Godwin MD as PCP - Internal Medicine  Albert Cooper MD as Consulting Physician (Ophthalmology)  Keely Sweeney MD as Consulting Physician (Obstetrics and Gynecology)  Maciel Sanchez MD as Consulting Physician (Gastroenterology)  Albin Kinney Jr., MD as Consulting Physician (Hand Surgery)  Martín Pindeo MD as Consulting Physician (Dermatology)  Reynaldo Simons MD as Consulting Physician (General Surgery)  Umer Hanson MD as Consulting Physician (Vascular Surgery)          2. Hypertension  Assessment & Plan:  BP elevated, improved on repeat; cont amlo 5mg QD #90, 3RF and rec  home monitoring with goal < 130/80; reviewed correct time and techniques to check blood pressure    Orders:  -     ECG 12 Lead  -     amLODIPine (NORVASC) 5 MG tablet; Take 1 tablet by mouth Daily.  Dispense: 90 tablet; Refill: 3  -     lisinopril (PRINIVIL,ZESTRIL) 20 MG tablet; Take 1 tablet by mouth Daily.  Dispense: 90 tablet; Refill: 3    3. Hyperlipidemia  Assessment & Plan:  Update lipids with goal LDL < 100; cont atorvastatin 10mg QD #90, 3RF and adjust dose if indicated    Orders:  -     atorvastatin (LIPITOR) 10 MG tablet; Take 1 tablet by mouth Daily.  Dispense: 90 tablet; Refill: 3    4. Impaired fasting glucose  Assessment & Plan:  BG control stable with a1C 5.8; encouraged reg phys activity to decr insulin resistance, moderation in unhealthy starches/sweets; f/u A1C in 6 mos      Orders:  -     POC Glycosylated Hemoglobin (Hb A1C)  -     amLODIPine (NORVASC) 5 MG tablet; Take 1 tablet by mouth Daily.  Dispense: 90 tablet; Refill: 3  -     lisinopril (PRINIVIL,ZESTRIL) 20 MG tablet; Take 1 tablet by mouth Daily.  Dispense: 90 tablet; Refill: 3    5. Vitamin D deficiency  Assessment & Plan:  Update vit D level on 2000 units QD supplementation      6. Screening for colon cancer  -     Ambulatory Referral to Gastroenterology    7. Right ear impacted cerumen  Comments:  s/p lavage with water pick and instrumentation with ear scoop; tolerated procedure well, no complications; advised against Qtips    8. Bilateral edema of lower extremity  Assessment & Plan:  Compression stockings has helped her leg pain but has not helped the right foot pain; therefore, discussed perhaps the laser procedure for the legs would not resolve her right foot pain; continue compression stockings with low-sodium diet and leg elevation      9. Right foot pain  Assessment & Plan:  X-ray showed mild degenerative changes of the midfoot; discussed pain most likely due to arthritis rather than the leg swelling      10.  Palpitations  Assessment & Plan:  Ectopy on exam but patient is asx and it is not captured on EKG; check electrolytes and TSH        FOLLOW-UP  1. Needs fasting PE labs  2. RTC 6 mos with A1C (and any other labs indicated)    Electronically signed by:    Lisset Godwin MD, FACP  08/25/2022

## 2022-08-24 NOTE — ASSESSMENT & PLAN NOTE
BG control stable with a1C 5.8; encouraged reg phys activity to decr insulin resistance, moderation in unhealthy starches/sweets; f/u A1C in 6 mos

## 2022-08-24 NOTE — ASSESSMENT & PLAN NOTE
Update lipids with goal LDL < 100; cont atorvastatin 10mg QD #90, 3RF and adjust dose if indicated

## 2022-08-24 NOTE — ASSESSMENT & PLAN NOTE
BP elevated, improved on repeat; cont amlo 5mg QD #90, 3RF and rec home monitoring with goal < 130/80; reviewed correct time and techniques to check blood pressure

## 2022-08-25 ENCOUNTER — LAB (OUTPATIENT)
Dept: LAB | Facility: HOSPITAL | Age: 72
End: 2022-08-25

## 2022-08-25 ENCOUNTER — OFFICE VISIT (OUTPATIENT)
Dept: INTERNAL MEDICINE | Facility: CLINIC | Age: 72
End: 2022-08-25

## 2022-08-25 VITALS
DIASTOLIC BLOOD PRESSURE: 64 MMHG | HEIGHT: 68 IN | SYSTOLIC BLOOD PRESSURE: 128 MMHG | WEIGHT: 225 LBS | OXYGEN SATURATION: 99 % | HEART RATE: 61 BPM | BODY MASS INDEX: 34.1 KG/M2

## 2022-08-25 DIAGNOSIS — R73.01 IMPAIRED FASTING GLUCOSE: Chronic | ICD-10-CM

## 2022-08-25 DIAGNOSIS — Z00.00 MEDICARE ANNUAL WELLNESS VISIT, SUBSEQUENT: ICD-10-CM

## 2022-08-25 DIAGNOSIS — R60.0 BILATERAL EDEMA OF LOWER EXTREMITY: Chronic | ICD-10-CM

## 2022-08-25 DIAGNOSIS — M79.671 RIGHT FOOT PAIN: ICD-10-CM

## 2022-08-25 DIAGNOSIS — I10 ESSENTIAL HYPERTENSION: Chronic | ICD-10-CM

## 2022-08-25 DIAGNOSIS — R00.2 PALPITATIONS: Chronic | ICD-10-CM

## 2022-08-25 DIAGNOSIS — E55.9 VITAMIN D DEFICIENCY: ICD-10-CM

## 2022-08-25 DIAGNOSIS — E78.2 MIXED HYPERLIPIDEMIA: Chronic | ICD-10-CM

## 2022-08-25 DIAGNOSIS — Z00.00 MEDICARE ANNUAL WELLNESS VISIT, SUBSEQUENT: Primary | Chronic | ICD-10-CM

## 2022-08-25 DIAGNOSIS — I10 ESSENTIAL HYPERTENSION: ICD-10-CM

## 2022-08-25 DIAGNOSIS — Z12.11 SCREENING FOR COLON CANCER: ICD-10-CM

## 2022-08-25 DIAGNOSIS — E55.9 VITAMIN D DEFICIENCY: Chronic | ICD-10-CM

## 2022-08-25 DIAGNOSIS — H61.21 RIGHT EAR IMPACTED CERUMEN: ICD-10-CM

## 2022-08-25 DIAGNOSIS — E78.2 MIXED HYPERLIPIDEMIA: ICD-10-CM

## 2022-08-25 LAB
25(OH)D3 SERPL-MCNC: 59.4 NG/ML (ref 30–100)
ALBUMIN SERPL-MCNC: 4.4 G/DL (ref 3.5–5.2)
ALBUMIN/GLOB SERPL: 1.7 G/DL
ALP SERPL-CCNC: 67 U/L (ref 39–117)
ALT SERPL W P-5'-P-CCNC: 19 U/L (ref 1–33)
ANION GAP SERPL CALCULATED.3IONS-SCNC: 10.6 MMOL/L (ref 5–15)
AST SERPL-CCNC: 16 U/L (ref 1–32)
BACTERIA UR QL AUTO: NORMAL /HPF
BASOPHILS # BLD AUTO: 0.03 10*3/MM3 (ref 0–0.2)
BASOPHILS NFR BLD AUTO: 0.4 % (ref 0–1.5)
BILIRUB SERPL-MCNC: 0.8 MG/DL (ref 0–1.2)
BILIRUB UR QL STRIP: NEGATIVE
BUN SERPL-MCNC: 17 MG/DL (ref 8–23)
BUN/CREAT SERPL: 23.3 (ref 7–25)
CALCIUM SPEC-SCNC: 9.4 MG/DL (ref 8.6–10.5)
CHLORIDE SERPL-SCNC: 101 MMOL/L (ref 98–107)
CHOLEST SERPL-MCNC: 160 MG/DL (ref 0–200)
CK SERPL-CCNC: 52 U/L (ref 20–180)
CLARITY UR: CLEAR
CO2 SERPL-SCNC: 24.4 MMOL/L (ref 22–29)
COLOR UR: YELLOW
CREAT SERPL-MCNC: 0.73 MG/DL (ref 0.57–1)
DEPRECATED RDW RBC AUTO: 43.7 FL (ref 37–54)
EGFRCR SERPLBLD CKD-EPI 2021: 87 ML/MIN/1.73
EOSINOPHIL # BLD AUTO: 0.13 10*3/MM3 (ref 0–0.4)
EOSINOPHIL NFR BLD AUTO: 1.7 % (ref 0.3–6.2)
ERYTHROCYTE [DISTWIDTH] IN BLOOD BY AUTOMATED COUNT: 13 % (ref 12.3–15.4)
EXPIRATION DATE: NORMAL
GLOBULIN UR ELPH-MCNC: 2.6 GM/DL
GLUCOSE SERPL-MCNC: 91 MG/DL (ref 65–99)
GLUCOSE UR STRIP-MCNC: NEGATIVE MG/DL
HBA1C MFR BLD: 5.8 %
HCT VFR BLD AUTO: 43.6 % (ref 34–46.6)
HDLC SERPL-MCNC: 54 MG/DL (ref 40–60)
HGB BLD-MCNC: 14.2 G/DL (ref 12–15.9)
HGB UR QL STRIP.AUTO: NEGATIVE
HYALINE CASTS UR QL AUTO: NORMAL /LPF
IMM GRANULOCYTES # BLD AUTO: 0.02 10*3/MM3 (ref 0–0.05)
IMM GRANULOCYTES NFR BLD AUTO: 0.3 % (ref 0–0.5)
KETONES UR QL STRIP: NEGATIVE
LDLC SERPL CALC-MCNC: 85 MG/DL (ref 0–100)
LDLC/HDLC SERPL: 1.52 {RATIO}
LEUKOCYTE ESTERASE UR QL STRIP.AUTO: NEGATIVE
LYMPHOCYTES # BLD AUTO: 2.07 10*3/MM3 (ref 0.7–3.1)
LYMPHOCYTES NFR BLD AUTO: 26.7 % (ref 19.6–45.3)
Lab: NORMAL
MCH RBC QN AUTO: 29.9 PG (ref 26.6–33)
MCHC RBC AUTO-ENTMCNC: 32.6 G/DL (ref 31.5–35.7)
MCV RBC AUTO: 91.8 FL (ref 79–97)
MONOCYTES # BLD AUTO: 0.42 10*3/MM3 (ref 0.1–0.9)
MONOCYTES NFR BLD AUTO: 5.4 % (ref 5–12)
NEUTROPHILS NFR BLD AUTO: 5.08 10*3/MM3 (ref 1.7–7)
NEUTROPHILS NFR BLD AUTO: 65.5 % (ref 42.7–76)
NITRITE UR QL STRIP: NEGATIVE
NRBC BLD AUTO-RTO: 0 /100 WBC (ref 0–0.2)
PH UR STRIP.AUTO: 6.5 [PH] (ref 5–8)
PLATELET # BLD AUTO: 206 10*3/MM3 (ref 140–450)
PMV BLD AUTO: 11.6 FL (ref 6–12)
POTASSIUM SERPL-SCNC: 4.3 MMOL/L (ref 3.5–5.2)
PROT SERPL-MCNC: 7 G/DL (ref 6–8.5)
PROT UR QL STRIP: NEGATIVE
RBC # BLD AUTO: 4.75 10*6/MM3 (ref 3.77–5.28)
RBC # UR STRIP: NORMAL /HPF
REF LAB TEST METHOD: NORMAL
SODIUM SERPL-SCNC: 136 MMOL/L (ref 136–145)
SP GR UR STRIP: <1.005 (ref 1–1.03)
SQUAMOUS #/AREA URNS HPF: NORMAL /HPF
TRIGL SERPL-MCNC: 119 MG/DL (ref 0–150)
TSH SERPL DL<=0.05 MIU/L-ACNC: 2.39 UIU/ML (ref 0.27–4.2)
UROBILINOGEN UR QL STRIP: ABNORMAL
VLDLC SERPL-MCNC: 21 MG/DL (ref 5–40)
WBC # UR STRIP: NORMAL /HPF
WBC NRBC COR # BLD: 7.75 10*3/MM3 (ref 3.4–10.8)

## 2022-08-25 PROCEDURE — 96160 PT-FOCUSED HLTH RISK ASSMT: CPT | Performed by: INTERNAL MEDICINE

## 2022-08-25 PROCEDURE — 82043 UR ALBUMIN QUANTITATIVE: CPT

## 2022-08-25 PROCEDURE — 82306 VITAMIN D 25 HYDROXY: CPT

## 2022-08-25 PROCEDURE — 80053 COMPREHEN METABOLIC PANEL: CPT

## 2022-08-25 PROCEDURE — 1125F AMNT PAIN NOTED PAIN PRSNT: CPT | Performed by: INTERNAL MEDICINE

## 2022-08-25 PROCEDURE — 1159F MED LIST DOCD IN RCRD: CPT | Performed by: INTERNAL MEDICINE

## 2022-08-25 PROCEDURE — 83036 HEMOGLOBIN GLYCOSYLATED A1C: CPT | Performed by: INTERNAL MEDICINE

## 2022-08-25 PROCEDURE — 81001 URINALYSIS AUTO W/SCOPE: CPT

## 2022-08-25 PROCEDURE — 82550 ASSAY OF CK (CPK): CPT

## 2022-08-25 PROCEDURE — G0439 PPPS, SUBSEQ VISIT: HCPCS | Performed by: INTERNAL MEDICINE

## 2022-08-25 PROCEDURE — 99497 ADVNCD CARE PLAN 30 MIN: CPT | Performed by: INTERNAL MEDICINE

## 2022-08-25 PROCEDURE — 82570 ASSAY OF URINE CREATININE: CPT

## 2022-08-25 PROCEDURE — 3044F HG A1C LEVEL LT 7.0%: CPT | Performed by: INTERNAL MEDICINE

## 2022-08-25 PROCEDURE — 1170F FXNL STATUS ASSESSED: CPT | Performed by: INTERNAL MEDICINE

## 2022-08-25 PROCEDURE — 99397 PER PM REEVAL EST PAT 65+ YR: CPT | Performed by: INTERNAL MEDICINE

## 2022-08-25 PROCEDURE — 80061 LIPID PANEL: CPT

## 2022-08-25 PROCEDURE — 84443 ASSAY THYROID STIM HORMONE: CPT

## 2022-08-25 PROCEDURE — 85025 COMPLETE CBC W/AUTO DIFF WBC: CPT

## 2022-08-25 RX ORDER — LISINOPRIL 20 MG/1
20 TABLET ORAL DAILY
Qty: 90 TABLET | Refills: 3 | Status: SHIPPED | OUTPATIENT
Start: 2022-08-25

## 2022-08-25 RX ORDER — AMLODIPINE BESYLATE 5 MG/1
5 TABLET ORAL DAILY
Qty: 90 TABLET | Refills: 3 | Status: SHIPPED | OUTPATIENT
Start: 2022-08-25

## 2022-08-25 RX ORDER — ATORVASTATIN CALCIUM 10 MG/1
10 TABLET, FILM COATED ORAL DAILY
Qty: 90 TABLET | Refills: 3 | Status: SHIPPED | OUTPATIENT
Start: 2022-08-25

## 2022-08-25 RX ORDER — TRIAMCINOLONE ACETONIDE 1 MG/G
CREAM TOPICAL
COMMUNITY
Start: 2022-08-10

## 2022-08-25 NOTE — ASSESSMENT & PLAN NOTE
Compression stockings has helped her leg pain but has not helped the right foot pain; therefore, discussed perhaps the laser procedure for the legs would not resolve her right foot pain; continue compression stockings with low-sodium diet and leg elevation

## 2022-08-25 NOTE — ASSESSMENT & PLAN NOTE
X-ray showed mild degenerative changes of the midfoot; discussed pain most likely due to arthritis rather than the leg swelling

## 2022-08-26 LAB
ALBUMIN UR-MCNC: <1.2 MG/DL
CREAT UR-MCNC: 10.9 MG/DL
MICROALBUMIN/CREAT UR: NORMAL MG/G{CREAT}

## 2022-09-26 ENCOUNTER — TELEPHONE (OUTPATIENT)
Dept: INTERNAL MEDICINE | Facility: CLINIC | Age: 72
End: 2022-09-26

## 2022-09-26 NOTE — TELEPHONE ENCOUNTER
Caller: Rosalie Amador    Relationship: Self    Best call back number: [phone]    What is the best time to reach you: ANYTIME     Who are you requesting to speak with (clinical staff, provider,  specific staff member): ML OR DR. MALIK BRASWELL     What was the call regarding: PATIENT NEEDS CALL REGARDING  ANKLE ,FOOT PAIN AND STIFFNESS     Do you require a callback:  YES PLEASE ASAP

## 2022-09-26 NOTE — TELEPHONE ENCOUNTER
Was in last week of August for wellness. She has continued lack of mobility, swelling, pain and venous insufficiency in her foot, ankle area.Had a follow up with Dr. Hanson at [facility] and he recommended Compression hose, elevation, low sodium diet and that has helped. He doesn't think  laser treatment will help. He thinks this is muscular skeletal problem. He recommends her doing PT and gave her an order. She was looking at the US that they did back in April and it states that the views were suboptimal and was wondering if  1. Should she have the xray redone to get better views?   2. Do you think she should start with the PT (you all did discuss this at her wellness and talked about going to [facility], she would like to stick with [facility]?  3. Her insurance told her that PT would need to be pre authorized, how does she go about getting that done and should she get the PT order from you instead of the vascular surgeon since she has medicare?

## 2022-09-26 NOTE — TELEPHONE ENCOUNTER
The tests done in 4/22 were xrays, which are looking at the bones. It showed arthritis changes and heel spur and most importantly no fractures or abnormal alignment.    If you are continuing to have feet/ankle pain, the next step would be podiatry or foot orthopedics Dr. Sánchez.    If vascular surgeon does not think laser intervention would help, then she will just need to continue consistent use of compression stockings, leg elevation and low Na diet.    She can go to PT wherever she wants, either [facility] or [facility]. She should be able to use the PT referral they have her, regardless of her insurance.

## 2022-11-01 RX ORDER — ONDANSETRON 4 MG/1
4 TABLET, FILM COATED ORAL EVERY 6 HOURS
Qty: 6 TABLET | Refills: 0 | Status: SHIPPED | OUTPATIENT
Start: 2022-11-01

## 2022-11-15 ENCOUNTER — OUTSIDE FACILITY SERVICE (OUTPATIENT)
Dept: GASTROENTEROLOGY | Facility: CLINIC | Age: 72
End: 2022-11-15

## 2022-11-15 PROCEDURE — 88305 TISSUE EXAM BY PATHOLOGIST: CPT

## 2022-11-15 PROCEDURE — 45385 COLONOSCOPY W/LESION REMOVAL: CPT | Performed by: INTERNAL MEDICINE

## 2022-11-15 PROCEDURE — G0500 MOD SEDAT ENDO SERVICE >5YRS: HCPCS | Performed by: INTERNAL MEDICINE

## 2022-11-16 ENCOUNTER — LAB REQUISITION (OUTPATIENT)
Dept: LAB | Facility: HOSPITAL | Age: 72
End: 2022-11-16

## 2022-11-16 DIAGNOSIS — Z12.11 ENCOUNTER FOR SCREENING FOR MALIGNANT NEOPLASM OF COLON: ICD-10-CM

## 2022-11-17 LAB — REF LAB TEST METHOD: NORMAL

## 2022-11-22 ENCOUNTER — TELEPHONE (OUTPATIENT)
Dept: GASTROENTEROLOGY | Facility: CLINIC | Age: 72
End: 2022-11-22

## 2022-11-22 PROBLEM — D12.6 SERRATED ADENOMA OF COLON: Status: ACTIVE | Noted: 2022-11-22

## 2022-11-22 PROBLEM — K57.30 DIVERTICULOSIS OF COLON: Status: ACTIVE | Noted: 2022-11-22

## 2022-11-22 NOTE — TELEPHONE ENCOUNTER
----- Message from Maciel Sanchez MD sent at 11/17/2022  2:06 PM EST -----  Please let Rosalie know that she had an adenoma.  Follow-up 5 years time is recommended

## 2023-02-28 ENCOUNTER — OFFICE VISIT (OUTPATIENT)
Dept: INTERNAL MEDICINE | Facility: CLINIC | Age: 73
End: 2023-02-28
Payer: MEDICARE

## 2023-02-28 VITALS
BODY MASS INDEX: 33.95 KG/M2 | SYSTOLIC BLOOD PRESSURE: 128 MMHG | HEART RATE: 61 BPM | HEIGHT: 68 IN | DIASTOLIC BLOOD PRESSURE: 76 MMHG | WEIGHT: 224 LBS | OXYGEN SATURATION: 99 %

## 2023-02-28 DIAGNOSIS — Z71.3 NUTRITIONAL COUNSELING: ICD-10-CM

## 2023-02-28 DIAGNOSIS — I10 ESSENTIAL HYPERTENSION: Chronic | ICD-10-CM

## 2023-02-28 DIAGNOSIS — R73.01 IMPAIRED FASTING GLUCOSE: Primary | Chronic | ICD-10-CM

## 2023-02-28 DIAGNOSIS — K57.30 DIVERTICULOSIS OF COLON: ICD-10-CM

## 2023-02-28 LAB
EXPIRATION DATE: NORMAL
HBA1C MFR BLD: 5.9 %
Lab: NORMAL

## 2023-02-28 PROCEDURE — 3044F HG A1C LEVEL LT 7.0%: CPT | Performed by: INTERNAL MEDICINE

## 2023-02-28 PROCEDURE — 83036 HEMOGLOBIN GLYCOSYLATED A1C: CPT | Performed by: INTERNAL MEDICINE

## 2023-02-28 PROCEDURE — 99214 OFFICE O/P EST MOD 30 MIN: CPT | Performed by: INTERNAL MEDICINE

## 2023-08-20 DIAGNOSIS — I10 ESSENTIAL HYPERTENSION: Chronic | ICD-10-CM

## 2023-08-20 DIAGNOSIS — R73.01 IMPAIRED FASTING GLUCOSE: Chronic | ICD-10-CM

## 2023-08-21 RX ORDER — LISINOPRIL 20 MG/1
TABLET ORAL
Qty: 30 TABLET | Refills: 0 | Status: SHIPPED | OUTPATIENT
Start: 2023-08-21

## 2023-09-02 RX ORDER — LISINOPRIL 20 MG/1
20 TABLET ORAL DAILY
Qty: 90 TABLET | Refills: 3 | Status: SHIPPED | OUTPATIENT
Start: 2023-09-02

## 2023-09-02 RX ORDER — AMLODIPINE BESYLATE 5 MG/1
5 TABLET ORAL DAILY
Qty: 90 TABLET | Refills: 3 | Status: SHIPPED | OUTPATIENT
Start: 2023-09-02

## 2023-09-02 RX ORDER — ATORVASTATIN CALCIUM 10 MG/1
10 TABLET, FILM COATED ORAL DAILY
Qty: 90 TABLET | Refills: 3 | Status: SHIPPED | OUTPATIENT
Start: 2023-09-02

## 2023-09-03 NOTE — ASSESSMENT & PLAN NOTE
BG control stable with A1C 5,7; encouraged reg phys activity to decr insulin resistance, moderation in unhealthy starches/sweets; f/u A1C in 6 mos

## 2023-09-03 NOTE — ASSESSMENT & PLAN NOTE
Health maintenance - COVID19 vacc done, incl bivalent vacc; flu vacc 10/22; rec flu vacc, new COVID19 vacc, and RSV vacc Sept/Oct; Prevnar 1/15, PVX 1/16, Td 5/20; (next Td due 2030); rec HAV - counseling given; Shingrix done; L. mammo  8/31/23 [facility]; GYN care with Dr. Sweeney 5/24/23 with nl Pap per pt; DEXA 11/21, repeat 2024; colonosc 11/22, repeat 2027 per Dr. Sanchez; eye exam with Dr. Cooper pending 10/23 (annual per pt); dental exam q6mos, pending 9/20/23 per pt; (+)seat belt use; PE labs ordered    Consultants:  Patient Care Team:  Lisset Godwin MD as PCP - General  Lisset Godwin MD as PCP - Internal Medicine  Albert Cooper MD as Consulting Physician (Ophthalmology)  Keely Sweeney MD as Consulting Physician (Obstetrics and Gynecology)  Maciel Sanchez MD as Consulting Physician (Gastroenterology)  Albin Kinney Jr., MD as Consulting Physician (Hand Surgery)  Martín Pinedo MD as Consulting Physician (Dermatology)  Reynaldo Simons MD as Consulting Physician (General Surgery)  Umer Hanson MD as Consulting Physician (Vascular Surgery)

## 2023-09-03 NOTE — PROGRESS NOTES
ANNUAL WELLNESS VISIT    DRUG AND ALCOHOL USE      no tobacco use    DIET AND PHYSICAL ACTIVITY     Diet: general    Exercise: infrequently   Exercise Details: chair exercises (KET program)    MOOD DISORDER AND COGNITIVE SCREENING     PHQ-2 Depression Screening - components reviewed with patient; screening is negative for active depression.    Little interest or pleasure in doing things? 0-->not at all   Feeling down, depressed, or hopeless? 0-->not at all   PHQ-2 Total Score 0      Anxiety Screening Tool Used YES     AUDIT screening  0     Mini-Cog Performed   Yes    1. Tell Patient 3 Words Apple Table PEn    2. Administer Clock Test normal    3. Recall 3 words  Apple Table Pen    4. Number Correct Items 3    FUNCTIONAL ABILITY AND LEVEL OF SAFETY   Hearing no hearing loss     Wears Hearing Aids No       Current Activities Independent      none  - see Funct/Cog Status Intake     Fall Risk Assessment       Has difficulty with walking or balance  No         Timed Up and Go (TUG) Test  8 sec.       If >12 sec, normal    ADVANCED DIRECTIVE  Advance Care Planning   ACP discussion was held with the patient during this visit. Patient does not have an advance directive, information provided.  Advance Care Planning Discussion:  16 min or more spent on counseling; patient does not have advanced directive and living will.  Reviewed desires for end of life care, which is to have comfort care.  Patient does not want extraordinary life-sustaining measures, including no prolonged artificial life support. Encouraged patient to ensure family is aware of desires/preferences. Confirmed  is her healthcare surrogate and her daughter is the alternate. Emmphasized conversation is the most important component.    PAIN SCREENING Do you have pain right now? no      If so, 1-10 scale: 0          Do you have pain every day? No      Probable chronic pain: No     Recent Hospitalizations:  No hospitalization(s) within the last year..  "    MEDICATION REVIEW   - updated and reviewed (see Medication List).   - reviewed for potentially harmful drug-disease interactions in the elderly.   - reviewed for high risk medications in the elderly.   - aspirin use: Yes, 81mg QD    BMI  Body mass index is 34.87 kg/m².  BMI is >= 30 and <35. (Class 1 Obesity). The following options were offered after discussion;: exercise counseling/recommendations and nutrition counseling/recommendations       _________________________________________________________    Chief Complaint   Patient presents with    Medicare Wellness-subsequent    Hypertension    Hyperlipidemia       History of Present Illness  73 y.o.  woman presents for updated phys examination and wellness visit as well as f/u on BP, cholesterol, and sugars.    Saw derm 2/23 RISSA Siu, who has left. Transferring care to Dr. Cha Jennings.    Review of Systems  Denies headaches, visual changes, CP, palpitations, SOB, cough, abd pain, n/v/d, difficulty with urination, numbness/tingling, falls, mood changes, lightheadedness, hearing changes, rashes.  Denies vaginal discharge or bleeding (no periods) or breast concerns.   All other ROS reviewed and negative.    Current Outpatient Medications:     amLODIPine (NORVASC) 5 MG QD    aspirin 81 MG QD    atorvastatin (LIPITOR) 10 MG QD    Cholecalciferol (VITAMIN D) 2000 units QD    Coenzyme Q10 (COQ-10) 200 MG QD    lisinopril (PRINIVIL,ZESTRIL) 20 MG  QD    Multiple Vitamin QD    ondansetron (ZOFRAN) 4 MG prn    triamcinolone (KENALOG) 0.1 % cream AD    Zinc Sulfate QD      VITALS:  /80   Pulse 76   Temp 97.4 °F (36.3 °C)   Resp 16   Ht 171.5 cm (67.5\")   Wt 103 kg (226 lb)   SpO2 97%   BMI 34.87 kg/m²     Physical Exam  Vitals and nursing note reviewed.   Constitutional:       General: She is not in acute distress.     Appearance: Normal appearance. She is well-developed.   HENT:      Head: Normocephalic.      Right Ear: Ear canal and " external ear normal.      Left Ear: Tympanic membrane, ear canal and external ear normal.      Ears:      Comments: R auditory canal partially occluded by cerumen     Nose: Nose normal.   Eyes:      Extraocular Movements: Extraocular movements intact.      Conjunctiva/sclera: Conjunctivae normal.      Pupils: Pupils are equal, round, and reactive to light.   Neck:      Vascular: No carotid bruit (bilaterally).   Cardiovascular:      Rate and Rhythm: Normal rate and regular rhythm.      Heart sounds: Normal heart sounds.      Comments: Frequent ectopy  Pulmonary:      Effort: Pulmonary effort is normal. No respiratory distress.      Breath sounds: Normal breath sounds. No wheezing, rhonchi or rales.   Abdominal:      General: Bowel sounds are normal. There is no distension.      Palpations: Abdomen is soft. There is no mass.      Tenderness: There is no abdominal tenderness.   Genitourinary:     Comments: Chaperone declined by patient.    Breast exam on left unremarkable without masses, skin changes, nipple discharge, or axillary adenopathy; R breast with intact scar, well-healed, no axillary adenopathy, rashes, or masses        Musculoskeletal:      Cervical back: Normal range of motion and neck supple.      Right lower leg: Edema (trace BLE edema bilaterally) present.      Left lower leg: Edema present.   Lymphadenopathy:      Cervical: No cervical adenopathy.   Skin:     General: Skin is warm and dry.      Findings: No rash.   Neurological:      Mental Status: She is alert.      Cranial Nerves: No cranial nerve deficit.      Deep Tendon Reflexes: Reflexes normal.   Psychiatric:         Mood and Affect: Mood normal.         Behavior: Behavior normal.       LABS  Results for orders placed or performed in visit on 09/05/23   POC Glycosylated Hemoglobin (Hb A1C)    Specimen: Blood   Result Value Ref Range    Hemoglobin A1C 5.7 %    Lot Number [id number]     Expiration Date 4/13/25      Results for orders placed or  performed in visit on 02/28/23   POC Glycosylated Hemoglobin (Hb A1C)    Specimen: Blood   Result Value Ref Range    Hemoglobin A1C 5.9 %    Lot Number [id number]     Expiration Date 11/21/24        ECG 12 Lead    Date/Time: 9/5/2023 9:44 AM  Performed by: Lisset Godwin MD  Authorized by: Lisset Godwin MD   Comparison: compared with previous ECG from 8/25/2022  Similar to previous ECG  Rhythm: sinus rhythm  Ectopy comments: frequent PACs  Rate: normal  BPM: 64  Conduction: conduction normal  ST Segments: ST segments normal  T Waves: T waves normal  QRS axis: normal  Clinical impression comment: stable EKG            ASSESSMENT/PLAN    Diagnoses and all orders for this visit:    1. Medicare annual wellness visit, subsequent (Primary)  Assessment & Plan:  Health maintenance - COVID19 vacc done, incl bivalent vacc; flu vacc 10/22; rec flu vacc, new COVID19 vacc, and RSV vacc Sept/Oct; Prevnar 1/15, PVX 1/16, Td 5/20; (next Td due 2030); rec HAV - counseling given; Shingrix done; L. mammo  8/31/23 [facility]; GYN care with Dr. Sweeney 5/24/23 with nl Pap per pt; DEXA 11/21, repeat 2024; colonosc 11/22, repeat 2027 per Dr. Sanchez; eye exam with Dr. Cooper pending 10/23 (annual per pt); dental exam q6mos, pending 9/20/23 per pt; (+)seat belt use; PE labs ordered    Consultants:  Patient Care Team:  Lisset Godwin MD as PCP - General  Lisset Godwin MD as PCP - Internal Medicine  Albert Cooper MD as Consulting Physician (Ophthalmology)  Keely Sweeney MD as Consulting Physician (Obstetrics and Gynecology)  Maciel Sanchez MD as Consulting Physician (Gastroenterology)  Albin Kinney Jr., MD as Consulting Physician (Hand Surgery)  Martín Pinedo MD as Consulting Physician (Dermatology)  Reynaldo Simons MD as Consulting Physician (General Surgery)  Umer Hanson MD as Consulting Physician (Vascular Surgery)      Orders:  -     CBC & Differential; Future  -     TSH; Future    2. Hypertension  Assessment &  Plan:  BP stable 130/80 on amlo 5mg QD #90, 3RF and lisin 20mg QD #90, 3RF; needs electrolytes for drug monitoring    Orders:  -     amLODIPine (NORVASC) 5 MG tablet; Take 1 tablet by mouth Daily.  Dispense: 90 tablet; Refill: 3  -     lisinopril (PRINIVIL,ZESTRIL) 20 MG tablet; Take 1 tablet by mouth Daily.  Dispense: 90 tablet; Refill: 3  -     Urinalysis With Microscopic - Urine, Clean Catch; Future  -     Microalbumin / Creatinine Urine Ratio - Urine, Clean Catch; Future  -     ECG 12 Lead    3. Hyperlipidemia  Assessment & Plan:  Needs updated lipids on atorvastatin 10mg QD #90, 3RF: goal LDL < 100, ideally < 70    Orders:  -     atorvastatin (LIPITOR) 10 MG tablet; Take 1 tablet by mouth Daily.  Dispense: 90 tablet; Refill: 3  -     Comprehensive Metabolic Panel; Future  -     Lipid Panel; Future  -     CK; Future    4. Impaired fasting glucose  Assessment & Plan:  BG control stable with A1C 5,7; encouraged reg phys activity to decr insulin resistance, moderation in unhealthy starches/sweets; f/u A1C in 6 mos      Orders:  -     POC Glycosylated Hemoglobin (Hb A1C)    5. Vitamin D deficiency  Assessment & Plan:  Needs updated vit D level on 2000 units QD supplementation    Orders:  -     Vitamin D,25-Hydroxy; Future    6. Osteopenia  Assessment & Plan:  Calcium and vitamin D supplementation with weight-bearing exercise; DEXA 11/21, repeat 2024         7. Palpitations  Assessment & Plan:  Not significantly symptomatic; note PACs on EKG, fairly frequent on exam; check electrolytes and TSH; avoid caffeine; follow clinically          FOLLOW-UP  Needs fasting PE labs  Patient very concerned about [facility] being out of network for [facility] as of 9/22/23  RTC 6 mos with A1c (and any other labs indicated, or earlier as needed)    Electronically signed by:    Lisset Godwin MD, FACP  09/05/2023

## 2023-09-03 NOTE — ASSESSMENT & PLAN NOTE
BP stable 130/80 on amlo 5mg QD #90, 3RF and lisin 20mg QD #90, 3RF; needs electrolytes for drug monitoring

## 2023-09-05 ENCOUNTER — LAB (OUTPATIENT)
Dept: LAB | Facility: HOSPITAL | Age: 73
End: 2023-09-05
Payer: MEDICARE

## 2023-09-05 ENCOUNTER — OFFICE VISIT (OUTPATIENT)
Dept: INTERNAL MEDICINE | Facility: CLINIC | Age: 73
End: 2023-09-05
Payer: MEDICARE

## 2023-09-05 VITALS
HEIGHT: 68 IN | BODY MASS INDEX: 34.25 KG/M2 | HEART RATE: 76 BPM | OXYGEN SATURATION: 97 % | SYSTOLIC BLOOD PRESSURE: 130 MMHG | WEIGHT: 226 LBS | RESPIRATION RATE: 16 BRPM | DIASTOLIC BLOOD PRESSURE: 80 MMHG | TEMPERATURE: 97.4 F

## 2023-09-05 DIAGNOSIS — E55.9 VITAMIN D DEFICIENCY: Chronic | ICD-10-CM

## 2023-09-05 DIAGNOSIS — E78.2 MIXED HYPERLIPIDEMIA: Chronic | ICD-10-CM

## 2023-09-05 DIAGNOSIS — I10 ESSENTIAL HYPERTENSION: Chronic | ICD-10-CM

## 2023-09-05 DIAGNOSIS — M85.89 OSTEOPENIA OF MULTIPLE SITES: Chronic | ICD-10-CM

## 2023-09-05 DIAGNOSIS — R73.01 IMPAIRED FASTING GLUCOSE: Chronic | ICD-10-CM

## 2023-09-05 DIAGNOSIS — Z00.00 MEDICARE ANNUAL WELLNESS VISIT, SUBSEQUENT: Chronic | ICD-10-CM

## 2023-09-05 DIAGNOSIS — Z00.00 MEDICARE ANNUAL WELLNESS VISIT, SUBSEQUENT: Primary | Chronic | ICD-10-CM

## 2023-09-05 DIAGNOSIS — R00.2 PALPITATIONS: Chronic | ICD-10-CM

## 2023-09-05 PROBLEM — I83.029 VARICOSE VEINS OF LEFT LOWER EXTREMITY WITH ULCER: Chronic | Status: ACTIVE | Noted: 2017-01-03

## 2023-09-05 PROBLEM — L97.929 VARICOSE VEINS OF LEFT LOWER EXTREMITY WITH ULCER: Chronic | Status: ACTIVE | Noted: 2017-01-03

## 2023-09-05 LAB
EXPIRATION DATE: NORMAL
HBA1C MFR BLD: 5.7 %
Lab: NORMAL

## 2023-09-06 LAB
25(OH)D3+25(OH)D2 SERPL-MCNC: 44.1 NG/ML (ref 30–100)
ALBUMIN SERPL-MCNC: 4.6 G/DL (ref 3.5–5.2)
ALBUMIN/CREAT UR: <28 MG/G CREAT (ref 0–29)
ALBUMIN/GLOB SERPL: 1.8 G/DL
ALP SERPL-CCNC: 74 U/L (ref 39–117)
ALT SERPL-CCNC: 21 U/L (ref 1–33)
APPEARANCE UR: CLEAR
AST SERPL-CCNC: 19 U/L (ref 1–32)
BACTERIA #/AREA URNS HPF: NORMAL /HPF
BASOPHILS # BLD AUTO: 0.02 10*3/MM3 (ref 0–0.2)
BASOPHILS NFR BLD AUTO: 0.3 % (ref 0–1.5)
BILIRUB SERPL-MCNC: 0.7 MG/DL (ref 0–1.2)
BILIRUB UR QL STRIP: NEGATIVE
BUN SERPL-MCNC: 16 MG/DL (ref 8–23)
BUN/CREAT SERPL: 21.3 (ref 7–25)
CALCIUM SERPL-MCNC: 9.8 MG/DL (ref 8.6–10.5)
CASTS URNS MICRO: NORMAL
CHLORIDE SERPL-SCNC: 102 MMOL/L (ref 98–107)
CHOLEST SERPL-MCNC: 157 MG/DL (ref 0–200)
CK SERPL-CCNC: 61 U/L (ref 20–180)
CO2 SERPL-SCNC: 25.2 MMOL/L (ref 22–29)
COLOR UR: YELLOW
CREAT SERPL-MCNC: 0.75 MG/DL (ref 0.57–1)
CREAT UR-MCNC: 10.8 MG/DL
EGFRCR SERPLBLD CKD-EPI 2021: 83.7 ML/MIN/1.73
EOSINOPHIL # BLD AUTO: 0.09 10*3/MM3 (ref 0–0.4)
EOSINOPHIL NFR BLD AUTO: 1.3 % (ref 0.3–6.2)
EPI CELLS #/AREA URNS HPF: NORMAL /HPF
ERYTHROCYTE [DISTWIDTH] IN BLOOD BY AUTOMATED COUNT: 13.2 % (ref 12.3–15.4)
GLOBULIN SER CALC-MCNC: 2.5 GM/DL
GLUCOSE SERPL-MCNC: 94 MG/DL (ref 65–99)
GLUCOSE UR QL STRIP: NEGATIVE
HCT VFR BLD AUTO: 41.8 % (ref 34–46.6)
HDLC SERPL-MCNC: 56 MG/DL (ref 40–60)
HGB BLD-MCNC: 14.5 G/DL (ref 12–15.9)
HGB UR QL STRIP: NEGATIVE
IMM GRANULOCYTES # BLD AUTO: 0.02 10*3/MM3 (ref 0–0.05)
IMM GRANULOCYTES NFR BLD AUTO: 0.3 % (ref 0–0.5)
KETONES UR QL STRIP: NEGATIVE
LDLC SERPL CALC-MCNC: 85 MG/DL (ref 0–100)
LEUKOCYTE ESTERASE UR QL STRIP: NEGATIVE
LYMPHOCYTES # BLD AUTO: 2.15 10*3/MM3 (ref 0.7–3.1)
LYMPHOCYTES NFR BLD AUTO: 30.6 % (ref 19.6–45.3)
MCH RBC QN AUTO: 31.5 PG (ref 26.6–33)
MCHC RBC AUTO-ENTMCNC: 34.7 G/DL (ref 31.5–35.7)
MCV RBC AUTO: 90.7 FL (ref 79–97)
MICROALBUMIN UR-MCNC: <3 UG/ML
MONOCYTES # BLD AUTO: 0.37 10*3/MM3 (ref 0.1–0.9)
MONOCYTES NFR BLD AUTO: 5.3 % (ref 5–12)
NEUTROPHILS # BLD AUTO: 4.38 10*3/MM3 (ref 1.7–7)
NEUTROPHILS NFR BLD AUTO: 62.2 % (ref 42.7–76)
NITRITE UR QL STRIP: NEGATIVE
NRBC BLD AUTO-RTO: 0 /100 WBC (ref 0–0.2)
PH UR STRIP: 7 [PH] (ref 5–8)
PLATELET # BLD AUTO: 209 10*3/MM3 (ref 140–450)
POTASSIUM SERPL-SCNC: 4.3 MMOL/L (ref 3.5–5.2)
PROT SERPL-MCNC: 7.1 G/DL (ref 6–8.5)
PROT UR QL STRIP: NEGATIVE
RBC # BLD AUTO: 4.61 10*6/MM3 (ref 3.77–5.28)
RBC #/AREA URNS HPF: NORMAL /HPF
SODIUM SERPL-SCNC: 138 MMOL/L (ref 136–145)
SP GR UR STRIP: NORMAL (ref 1–1.03)
TRIGL SERPL-MCNC: 88 MG/DL (ref 0–150)
TSH SERPL DL<=0.005 MIU/L-ACNC: 3.42 UIU/ML (ref 0.27–4.2)
UROBILINOGEN UR STRIP-MCNC: NORMAL MG/DL
VLDLC SERPL CALC-MCNC: 16 MG/DL (ref 5–40)
WBC # BLD AUTO: 7.03 10*3/MM3 (ref 3.4–10.8)
WBC #/AREA URNS HPF: NORMAL /HPF

## 2023-09-06 NOTE — ASSESSMENT & PLAN NOTE
Not significantly symptomatic; note PACs on EKG, fairly frequent on exam; check electrolytes and TSH; avoid caffeine; follow clinically

## 2023-09-17 DIAGNOSIS — I10 ESSENTIAL HYPERTENSION: Chronic | ICD-10-CM

## 2023-09-17 RX ORDER — LISINOPRIL 20 MG/1
20 TABLET ORAL DAILY
Qty: 30 TABLET | OUTPATIENT
Start: 2023-09-17

## 2024-02-08 ENCOUNTER — OFFICE VISIT (OUTPATIENT)
Dept: INTERNAL MEDICINE | Facility: CLINIC | Age: 74
End: 2024-02-08
Payer: MEDICARE

## 2024-02-08 VITALS
DIASTOLIC BLOOD PRESSURE: 74 MMHG | BODY MASS INDEX: 33.95 KG/M2 | HEART RATE: 80 BPM | OXYGEN SATURATION: 100 % | WEIGHT: 224 LBS | SYSTOLIC BLOOD PRESSURE: 142 MMHG | HEIGHT: 68 IN | RESPIRATION RATE: 18 BRPM

## 2024-02-08 DIAGNOSIS — I10 ELEVATED BLOOD PRESSURE READING IN OFFICE WITH DIAGNOSIS OF HYPERTENSION: ICD-10-CM

## 2024-02-08 DIAGNOSIS — I10 ESSENTIAL HYPERTENSION: Primary | Chronic | ICD-10-CM

## 2024-02-08 PROCEDURE — 3077F SYST BP >= 140 MM HG: CPT | Performed by: NURSE PRACTITIONER

## 2024-02-08 PROCEDURE — 99214 OFFICE O/P EST MOD 30 MIN: CPT | Performed by: NURSE PRACTITIONER

## 2024-02-08 PROCEDURE — 3078F DIAST BP <80 MM HG: CPT | Performed by: NURSE PRACTITIONER

## 2024-02-08 PROCEDURE — 1159F MED LIST DOCD IN RCRD: CPT | Performed by: NURSE PRACTITIONER

## 2024-02-08 PROCEDURE — 1160F RVW MEDS BY RX/DR IN RCRD: CPT | Performed by: NURSE PRACTITIONER

## 2024-02-08 NOTE — PROGRESS NOTES
Follow Up Office Visit      Date: 2024   Patient Name: Rosalie Amador  : 1950   MRN: [id number]     Chief Complaint:    Chief Complaint   Patient presents with    Headache     Pt states BP has been elevated some in the last week or so, and has headache that will come and go.    Hypertension       History of Present Illness: Rosalie Amador is a 73 y.o. female who is here today for an acute visit and evaluation of elevated BP and MURRAY.    Pt has history of HTN. She is compliant with current tx which includes lisinopril 20mg and amlodipine 5mg daily. She reports monitoring her blood pressure at home with an older blood pressure machine for the past 2 days. She did bring her home auto bp cuff to apt today. There is quite a large discrepancy in readings. At this time when her blood pressure was checked with her automated cuff from home= 174/83. She was checked with manual reading by MA and the reading was 142/74. She reports having 2 mild headaches over the past week but also states she has picked up on her caffeine intake the last week or so. Denies any associated symptoms with headaches. Also endorses increased caffeine lately as well as decrease in normal/ routine physical activity.       Subjective      Review of Systems:   Review of Systems   Eyes:  Negative for blurred vision.   Respiratory:  Negative for chest tightness.    Cardiovascular:  Negative for chest pain, palpitations and leg swelling.       I have reviewed the patients family history, social history, past medical history, past surgical history and have updated it as appropriate.     Medications:     Current Outpatient Medications:     amLODIPine (NORVASC) 5 MG tablet, Take 1 tablet by mouth Daily., Disp: 90 tablet, Rfl: 3    aspirin 81 MG tablet, Take  by mouth daily., Disp: , Rfl:     atorvastatin (LIPITOR) 10 MG tablet, Take 1 tablet by mouth Daily., Disp: 90 tablet, Rfl: 3    Cholecalciferol (VITAMIN D) 2000 units capsule, Take  by  "mouth Daily., Disp: , Rfl:     Coenzyme Q10 (COQ-10) 200 MG capsule, Take  by mouth daily., Disp: , Rfl:     lisinopril (PRINIVIL,ZESTRIL) 20 MG tablet, Take 1 tablet by mouth Daily., Disp: 90 tablet, Rfl: 3    Multiple Vitamin (MULTIVITAMINS PO), Take  by mouth daily., Disp: , Rfl:     ondansetron (ZOFRAN) 4 MG tablet, Take 1 tablet by mouth Every 6 (Six) Hours., Disp: 6 tablet, Rfl: 0    polyethylene glycol (GoLYTELY) 236 g solution, As directed, Disp: 4000 mL, Rfl: 0    triamcinolone (KENALOG) 0.1 % cream, , Disp: , Rfl:     Zinc Sulfate (ZINC 15 PO), Take  by mouth., Disp: , Rfl:     Allergies:   Allergies   Allergen Reactions    Morphine And Related Hallucinations    Benzonatate Headache       Objective     Physical Exam: Please see above  Vital Signs:   Vitals:    02/08/24 1012   BP: 142/74   Pulse: 80   Resp: 18   SpO2: 100%   Weight: 102 kg (224 lb)   Height: 171.5 cm (67.5\")   PainSc: 0-No pain     Body mass index is 34.57 kg/m².    Physical Exam  Vitals and nursing note reviewed.   Constitutional:       General: She is not in acute distress.     Appearance: Normal appearance. She is not ill-appearing.   Cardiovascular:      Rate and Rhythm: Normal rate and regular rhythm.      Heart sounds: Normal heart sounds. No murmur heard.  Pulmonary:      Effort: Pulmonary effort is normal. No respiratory distress.      Breath sounds: Normal breath sounds.   Skin:     General: Skin is warm and dry.   Neurological:      Mental Status: She is alert and oriented to person, place, and time.   Psychiatric:         Mood and Affect: Mood normal.         Behavior: Behavior normal.             Results:   Imaging:     Labs:    Reviewed labs from 9-5-23    Reviewed EKG from 9-5-23    Assessment / Plan      Assessment/Plan:   Diagnoses and all orders for this visit:    1. Hypertension (Primary)  2. Elevated blood pressure reading in office with diagnosis of hypertension    Mrs. Amador states it will not be a problem to obtain a " new automated BP cuff. Discussed that I do not believe her BP is quite as high as the readings she has been getting at home with old montior, but her BP is still above goal and HAs are likely symptom from episodes of elevated readings. Advised that headaches may also be secondary to increased caffeine intake she is not use to and vise versa-- high caffeine intake can raise both BP and HR. Discontinue or significantly reduce caffeine, prefer water. Recommended upper arm over wrist monitor. She will stop by pharmacy/ medical supply desk to make sure new cuff properly fits before purchasing. She will also make an effort to resume her previous normal routine physical activity. She will continue amlodipine as previously prescribed, but I instructed to increase lisinopril from 20 to 40mg once daily. Pt already scheduled to f/u with PCP in one month. This is good timing to recheck HTN. Continue daily log and bring this and also new cuff to next apt. Offered to send new Rx, but she prefers to double up until she sees Dr Godwin back to f/u and has enough quantity left to do so.      Follow Up:   Return if symptoms worsen or fail to improve, for Next scheduled follow up.    FLAKITO Wynn  [facility]

## 2024-03-04 NOTE — PROGRESS NOTES
"Chief Complaint   Patient presents with    Impaired fasting glucose    Hypertension       History of Present Illness  73 y.o.  female presents for f/u on sugars.Lisin was incr'd to 40mg QD last visit per FLAKITO Saini. Got new BP machine and home readings have been mostly 100s/60s, pulse 60s.    Review of Systems  Denies CP, SOB, falls. All other ROS reviewed and negative.    Current Outpatient Medications:     amLODIPine 5 MG QD    aspirin 81 MG QD    atorvastatin 10 MG QD    Cholecalciferol (VITAMIN D) 2000 units QD    Coenzyme Q10 200 MG QD    lisinopril 40 MG     Multiple Vitamin QD     ondansetron (ZOFRAN) 4 MG prn    polyethylene glycol (GoLYTELY) 236 g QD    triamcinolone (KENALOG) 0.1 % cream AD    Zinc Sulfate QD    VITALS:  /70   Pulse 81   Temp 97.7 °F (36.5 °C)   Ht 171.5 cm (67.5\")   Wt 103 kg (226 lb 3.2 oz)   SpO2 100%   BMI 34.91 kg/m²   Repeat BP left arm her machine 150/84, pulse 69; manual left arm 134/68  Repeat BP left arm her machine 131/77    Physical Exam  Vitals and nursing note reviewed.   Constitutional:       General: She is not in acute distress.     Appearance: Normal appearance. She is not ill-appearing.   Eyes:      Extraocular Movements: Extraocular movements intact.      Conjunctiva/sclera: Conjunctivae normal.   Pulmonary:      Effort: Pulmonary effort is normal. No respiratory distress.   Neurological:      Mental Status: She is alert. Mental status is at baseline.   Psychiatric:         Mood and Affect: Mood normal.         Behavior: Behavior normal.         LABS  Results for orders placed or performed in visit on 03/05/24   POC Glycosylated Hemoglobin (Hb A1C)    Specimen: Blood   Result Value Ref Range    Hemoglobin A1C 5.9 (A) 4.5 - 5.7 %    Lot Number [id number]     Expiration Date 10/22/2025      9/23 A1C 5.7    ASSESSMENT/PLAN    Diagnoses and all orders for this visit:    1. Impaired fasting glucose (Primary)  Assessment & Plan:  BG control stable " with A1C 5.9; encouraged reg phys activity to decr insulin resistance, moderation in unhealthy starches/sweets; f/u A1C in 6 mos      Orders:  -     POC Glycosylated Hemoglobin (Hb A1C)    2. Hypertension  Assessment & Plan:  BP machine verified in the office - rec putting cuff on arm, waiting 10-20 min, and then checking BPs;note now bord hypotensive; is asx; cont amlo 5mg QD and resume lisin 20mg QD; cont checking BPs at home and send MyChart msg if BPs go back up; pt notes prev high BPs likely related to old, inaccurate BP machine; f/u in 3 mos at the latest          FOLLOW-UP  Health maintenance - flu vacc, COVID19 vacc, and RSV vacc all completed for this season  RTC 3 mos for BP check    Electronically signed by:    Lisset Godwin MD, FACP  03/05/2024

## 2024-03-04 NOTE — ASSESSMENT & PLAN NOTE
BP machine verified in the office - rec putting cuff on arm, waiting 10-20 min, and then checking BPs;note now bord hypotensive; is asx; cont amlo 5mg QD and resume lisin 20mg QD; cont checking BPs at home and send MyChart msg if BPs go back up; pt notes prev high BPs likely related to old, inaccurate BP machine; f/u in 3 mos at the latest

## 2024-03-05 ENCOUNTER — OFFICE VISIT (OUTPATIENT)
Dept: INTERNAL MEDICINE | Facility: CLINIC | Age: 74
End: 2024-03-05
Payer: MEDICARE

## 2024-03-05 VITALS
WEIGHT: 226.2 LBS | DIASTOLIC BLOOD PRESSURE: 70 MMHG | SYSTOLIC BLOOD PRESSURE: 138 MMHG | TEMPERATURE: 97.7 F | HEIGHT: 68 IN | HEART RATE: 81 BPM | BODY MASS INDEX: 34.28 KG/M2 | OXYGEN SATURATION: 100 %

## 2024-03-05 DIAGNOSIS — I10 ESSENTIAL HYPERTENSION: Chronic | ICD-10-CM

## 2024-03-05 DIAGNOSIS — R73.01 IMPAIRED FASTING GLUCOSE: Primary | Chronic | ICD-10-CM

## 2024-03-05 LAB
EXPIRATION DATE: ABNORMAL
HBA1C MFR BLD: 5.9 % (ref 4.5–5.7)
Lab: ABNORMAL

## 2024-03-06 NOTE — ACP (ADVANCE CARE PLANNING)
"Initial ACP referral telephone conversation completed with Ms. Rosalie Amador. During this call Ms Amador shared that her mother recently passed away.  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 Ms Amador became teary at times during the call and states she is still grieving the loss of her mother and does not feel ready to address her own Living Will right now.  She states she is also still debating her own surrogate choice - states it will either be her  or daughter. Qualities required of a healthcare surrogate were discussed. She states she's not sure regarding choosing her spouse and states she has tried to get him to go with her to complete a Living Will, but states \"he has his head in the sand\" about it.     RN-LEAH briefly discussed [place]'s next-of-kin order of priority with Ms Amador, if no surrogate is identified in a Living Will, and she verbalized understanding.  Ms Amador verb appreciation for the call and states she  would like ACP materials be mailed to her home. She states her best phone number is her mobile. Her listed home mailing address was verified correct.  She states due to her own grieving she does not want a follow-up call from the ACP team at this time, but she took the ACP facilitation team phone # [phone] and the ACP team member's names for when she is ready.  She acknowledged that the ACP facilitation team ph # will also be on the ACP information she receives. She was also invited to call the RN-ACM at any time for assistance.   Ms Amador verbalized understanding that her PCP will need a copy of her final Living Will and she again verbalized appreciation for the call.  "
as per mom, pt had seizure 2 hours prior to arrival to er   mom stated pts face turned blue  mom gave 5 ml tylenol due to fever   pt has been having runny nose, cough, and shivering since yesterday   pt saw pediatrician today, was prescribed amoxicillin, has not started medication yet   mom stated every time pt has fever, he experiences seizure activity

## 2024-04-12 ENCOUNTER — TELEPHONE (OUTPATIENT)
Dept: INTERNAL MEDICINE | Facility: CLINIC | Age: 74
End: 2024-04-12

## 2024-04-12 NOTE — TELEPHONE ENCOUNTER
Caller: Rosalie Amador    Relationship: Self    Best call back number: [phone]    What is the best time to reach you: ANYTIME    Who are you requesting to speak with (clinical staff, provider,  specific staff member): CLINICAL STAFF    Do you know the name of the person who called: PATIENT/     What was the call regarding: SHOULD SHE RECEIVE THE NEXT COVID VACCINE. DR BRASWELL SAYS SHE WILL NOT NEED THE VACCINE FOR A YEAR, HOWEVER THE PHARMACY KEEPS SENDING HER MESSAGE THAT SHE NEEDS TO GET THE VACCINE    Is it okay if the provider responds through MyChart:

## 2024-04-12 NOTE — TELEPHONE ENCOUNTER
Pt notified that the CDC is recommending an additional dose of the covid vaccine for 65 and older. Pt verbalized understanding and will get it done at the pharmacy.

## 2024-07-22 NOTE — PROGRESS NOTES
"Chief Complaint   Patient presents with    Hypertension       History of Present Illness  74 y.o.  female presents for BP follow-up. Home BPs have been mostly 100-130s/60s. Brings BP machine to verify accuracy.     Review of Systems  Denies CP, SOB, headaches. All other ROS reviewed and negative.    Current Outpatient Medications:     amLODIPine  5 MG QD    aspirin 81 MG QD     atorvastatin 10 MG QD    Cholecalciferol (VITAMIN D) 2000 units QD    Coenzyme Q10 200 MG QD    lisinopril 20 MG QD    Multiple Vitamin QD    ondansetron (ZOFRAN) 4 MG prn    polyethylene glycol (GoLYTELY) 236 g solution AD    triamcinolone (KENALOG) 0.1 % cream AD:     Zinc Sulfate QD    VITALS:  /66   Pulse 72   Temp 98.4 °F (36.9 °C)   Ht 171.5 cm (67.5\")   Wt 103 kg (227 lb 9.6 oz)   SpO2 98%   BMI 35.12 kg/m²   Her machine left arm 140/80  Repeat her machine left arm 137/82, pulse 67; manual BP left arm 124/66, repeat again her machine right arm 133/70    Physical Exam  Vitals and nursing note reviewed.   Constitutional:       General: She is not in acute distress.     Appearance: Normal appearance. She is not ill-appearing.   Eyes:      Extraocular Movements: Extraocular movements intact.      Conjunctiva/sclera: Conjunctivae normal.   Cardiovascular:      Rate and Rhythm: Normal rate and regular rhythm.      Heart sounds: Normal heart sounds.   Pulmonary:      Effort: Pulmonary effort is normal. No respiratory distress.      Breath sounds: Normal breath sounds. No wheezing or rales.   Neurological:      Mental Status: She is alert and oriented to person, place, and time. Mental status is at baseline.   Psychiatric:         Mood and Affect: Mood normal.         Behavior: Behavior normal.         LABS  Results for orders placed or performed in visit on 03/05/24   POC Glycosylated Hemoglobin (Hb A1C)    Specimen: Blood   Result Value Ref Range    Hemoglobin A1C 5.9 (A) 4.5 - 5.7 %    Lot Number [id number]     " Expiration Date 10/22/2025      ASSESSMENT/PLAN    Diagnoses and all orders for this visit:    1. Hypertension (Primary)  Assessment & Plan:  BP elevated, back to normal on repeat; checked her BP machine several times and it appears BPs are reading about systolic 10 points too high; this would suggest her BPs at home are borderline hypotensive; reviewed s/sxs of hypotension; cont amlo 5mg QD and lisin 20mg QD; cont home BP monitoring with goal < 130/80 but ideally get new BP cuff; will need to consider decreasing dose of amlodipine of BPs become too low; f/u in 6 wks as scheduled for next wellness      2. Impaired fasting glucose  Assessment & Plan:  Update A1C with upcoming wellness      3. Vaccine counseling  Comments:  rec flu vacc and COVID19 vacc in Sept/Oct        FOLLOW-UP  Health maintenance - rec flu vacc and COVID19 vacc in the fall; RSV vacc completed  RTC for next wellness 9/12/24; fasting labs prior to appt (CBC, CMP, TSH, lipids, UA/micr, microalb, vit D, A1C, CPK) and BP check    Electronically signed by:    Lisset Godwin MD, FACP  07/26/2024

## 2024-07-22 NOTE — ASSESSMENT & PLAN NOTE
BP elevated, back to normal on repeat; checked her BP machine several times and it appears BPs are reading about systolic 10 points too high; this would suggest her BPs at home are borderline hypotensive; reviewed s/sxs of hypotension; cont amlo 5mg QD and lisin 20mg QD; cont home BP monitoring with goal < 130/80 but ideally get new BP cuff; will need to consider decreasing dose of amlodipine of BPs become too low; f/u in 6 wks as scheduled for next wellness

## 2024-07-26 ENCOUNTER — OFFICE VISIT (OUTPATIENT)
Dept: INTERNAL MEDICINE | Facility: CLINIC | Age: 74
End: 2024-07-26
Payer: MEDICARE

## 2024-07-26 VITALS
TEMPERATURE: 98.4 F | DIASTOLIC BLOOD PRESSURE: 66 MMHG | HEART RATE: 72 BPM | BODY MASS INDEX: 34.49 KG/M2 | OXYGEN SATURATION: 98 % | HEIGHT: 68 IN | WEIGHT: 227.6 LBS | SYSTOLIC BLOOD PRESSURE: 124 MMHG

## 2024-07-26 DIAGNOSIS — I10 ESSENTIAL HYPERTENSION: Primary | Chronic | ICD-10-CM

## 2024-07-26 DIAGNOSIS — R73.01 IMPAIRED FASTING GLUCOSE: Chronic | ICD-10-CM

## 2024-07-26 DIAGNOSIS — Z71.85 VACCINE COUNSELING: ICD-10-CM

## 2024-07-26 PROCEDURE — 1159F MED LIST DOCD IN RCRD: CPT | Performed by: INTERNAL MEDICINE

## 2024-07-26 PROCEDURE — 3074F SYST BP LT 130 MM HG: CPT | Performed by: INTERNAL MEDICINE

## 2024-07-26 PROCEDURE — 1160F RVW MEDS BY RX/DR IN RCRD: CPT | Performed by: INTERNAL MEDICINE

## 2024-07-26 PROCEDURE — 1126F AMNT PAIN NOTED NONE PRSNT: CPT | Performed by: INTERNAL MEDICINE

## 2024-07-26 PROCEDURE — 99214 OFFICE O/P EST MOD 30 MIN: CPT | Performed by: INTERNAL MEDICINE

## 2024-07-26 PROCEDURE — 3078F DIAST BP <80 MM HG: CPT | Performed by: INTERNAL MEDICINE

## 2024-07-26 PROCEDURE — G2211 COMPLEX E/M VISIT ADD ON: HCPCS | Performed by: INTERNAL MEDICINE

## 2024-08-11 NOTE — PROGRESS NOTES
"Chief Complaint   Patient presents with    Pain and swelling in left foot/ankle       History of Present Illness  74 y.o.  female presents for further evaluation of left ankle pain and swelling. Thinks she twisted her ankle in the kitchen about 2 weeks ago. Reports swelling and pain at the outer aspect, persistent despite ice and elevation. Has only taken 1-2 doses of 2 Advil due to concerns with potential risk from medication. Has been using ACE wrap. Usually wears compression stockings to help with swelling.     Review of Systems  ROS (+) for outer L ankle pain and swelling due to sprain. Did not fall.  All other ROS reviewed and negative.      Current Outpatient Medications:     amLODIPine 5 MG QD    aspirin 81 MG QD    atorvastatin 10 MG QD    Cholecalciferol (VITAMIN D) 2000 units cQD    Coenzyme Q10 200 MG QD    lisinopril 20 MG QD    Multiple Vitamin QD    ondansetron 4 MG prn    triamcinolone (KENALOG) 0.1 % cream AD    Zinc Sulfate QD    VITALS:  /72   Pulse 73   Temp 98.2 °F (36.8 °C)   Ht 171.5 cm (67.5\")   Wt 103 kg (227 lb)   SpO2 97%   BMI 35.03 kg/m²     Physical Exam  Vitals and nursing note reviewed.   Constitutional:       General: She is not in acute distress.     Appearance: Normal appearance. She is not ill-appearing.   Eyes:      Extraocular Movements: Extraocular movements intact.      Conjunctiva/sclera: Conjunctivae normal.   Pulmonary:      Effort: Pulmonary effort is normal. No respiratory distress.   Musculoskeletal:         General: Swelling (swelling at left lat malleolus without assoc'd warmth or erythema; swelling at medial malleolus is only slightly more than baseline per patient, assoc'd with spider and varicose veins) present.      Right lower leg: Edema (minimal with compression stocking) present.      Left lower leg: Edema (1+ nonpitting edema without compression stocking; note swelling bilat left ankle malleolue, medial close to baseline swelling, lateral " swelling new) present.   Neurological:      Mental Status: She is alert and oriented to person, place, and time. Mental status is at baseline.   Psychiatric:         Mood and Affect: Mood normal.         Behavior: Behavior normal.         LABS  Results for orders placed or performed in visit on 03/05/24   POC Glycosylated Hemoglobin (Hb A1C)    Specimen: Blood   Result Value Ref Range    Hemoglobin A1C 5.9 (A) 4.5 - 5.7 %    Lot Number [id number]     Expiration Date 10/22/2025        ASSESSMENT/PLAN    Diagnoses and all orders for this visit:    1. Sprain of tibiofibular ligament of left ankle, initial encounter (Primary)  Comments:  more aggressive ice/elevation; incr ibuprofen to tid prn w/ food and wean back over next wk; rec aircast for L ankle; minimize wt-bearing; rec PT if no better    2. Venous insufficiency of both lower extremities  Assessment & Plan:  Rec cont compression stockings bilat legs; leg elevation and low Na diet as possible      3. Hypertension  Assessment & Plan:  BP bord/stable 134/72; cont amlo 5mg QD and lisin 20mg QD      4. Vaccine counseling  Comments:  rec flu vacc and COVID19 vacc in Sept/Oct        FOLLOW-UP  Health maintenance - rec flu vacc and COVID19 vacc in the fall; RSV vacc completed  RTC for next wellness 9/12/24; fasting labs prior to appt (CBC, CMP, TSH, lipids, UA/micr, microalb, vit D, A1C, CPK) and f/u on L ankle sprain if needed    Electronically signed by:    Lisset Godwin MD, FACP  08/12/2024

## 2024-08-12 ENCOUNTER — OFFICE VISIT (OUTPATIENT)
Dept: INTERNAL MEDICINE | Facility: CLINIC | Age: 74
End: 2024-08-12
Payer: MEDICARE

## 2024-08-12 VITALS
TEMPERATURE: 98.2 F | DIASTOLIC BLOOD PRESSURE: 72 MMHG | HEART RATE: 73 BPM | SYSTOLIC BLOOD PRESSURE: 134 MMHG | WEIGHT: 227 LBS | BODY MASS INDEX: 34.4 KG/M2 | OXYGEN SATURATION: 97 % | HEIGHT: 68 IN

## 2024-08-12 DIAGNOSIS — I87.2 VENOUS INSUFFICIENCY OF BOTH LOWER EXTREMITIES: Chronic | ICD-10-CM

## 2024-08-12 DIAGNOSIS — Z71.85 VACCINE COUNSELING: ICD-10-CM

## 2024-08-12 DIAGNOSIS — I10 ESSENTIAL HYPERTENSION: Chronic | ICD-10-CM

## 2024-08-12 DIAGNOSIS — S93.432A SPRAIN OF TIBIOFIBULAR LIGAMENT OF LEFT ANKLE, INITIAL ENCOUNTER: Primary | ICD-10-CM

## 2024-08-12 PROCEDURE — 99214 OFFICE O/P EST MOD 30 MIN: CPT | Performed by: INTERNAL MEDICINE

## 2024-08-12 PROCEDURE — 1125F AMNT PAIN NOTED PAIN PRSNT: CPT | Performed by: INTERNAL MEDICINE

## 2024-08-12 PROCEDURE — 3078F DIAST BP <80 MM HG: CPT | Performed by: INTERNAL MEDICINE

## 2024-08-12 PROCEDURE — G2211 COMPLEX E/M VISIT ADD ON: HCPCS | Performed by: INTERNAL MEDICINE

## 2024-08-12 PROCEDURE — 3075F SYST BP GE 130 - 139MM HG: CPT | Performed by: INTERNAL MEDICINE

## 2024-08-21 DIAGNOSIS — E78.2 MIXED HYPERLIPIDEMIA: Chronic | ICD-10-CM

## 2024-08-21 DIAGNOSIS — E55.9 VITAMIN D DEFICIENCY: Chronic | ICD-10-CM

## 2024-08-21 DIAGNOSIS — Z00.00 MEDICARE ANNUAL WELLNESS VISIT, SUBSEQUENT: Primary | Chronic | ICD-10-CM

## 2024-08-21 DIAGNOSIS — R73.01 IMPAIRED FASTING GLUCOSE: Chronic | ICD-10-CM

## 2024-09-06 ENCOUNTER — LAB (OUTPATIENT)
Dept: LAB | Facility: HOSPITAL | Age: 74
End: 2024-09-06
Payer: MEDICARE

## 2024-09-06 DIAGNOSIS — E78.2 MIXED HYPERLIPIDEMIA: ICD-10-CM

## 2024-09-06 DIAGNOSIS — E55.9 VITAMIN D DEFICIENCY: ICD-10-CM

## 2024-09-06 DIAGNOSIS — R73.01 IMPAIRED FASTING GLUCOSE: ICD-10-CM

## 2024-09-06 DIAGNOSIS — Z00.00 MEDICARE ANNUAL WELLNESS VISIT, SUBSEQUENT: ICD-10-CM

## 2024-09-06 LAB
25(OH)D3 SERPL-MCNC: 55.7 NG/ML (ref 30–100)
ALBUMIN SERPL-MCNC: 4.1 G/DL (ref 3.5–5.2)
ALBUMIN UR-MCNC: <1.2 MG/DL
ALBUMIN/GLOB SERPL: 1.6 G/DL
ALP SERPL-CCNC: 67 U/L (ref 39–117)
ALT SERPL W P-5'-P-CCNC: 17 U/L (ref 1–33)
ANION GAP SERPL CALCULATED.3IONS-SCNC: 11.4 MMOL/L (ref 5–15)
AST SERPL-CCNC: 17 U/L (ref 1–32)
BACTERIA UR QL AUTO: NORMAL /HPF
BASOPHILS # BLD AUTO: 0.02 10*3/MM3 (ref 0–0.2)
BASOPHILS NFR BLD AUTO: 0.3 % (ref 0–1.5)
BILIRUB SERPL-MCNC: 0.7 MG/DL (ref 0–1.2)
BILIRUB UR QL STRIP: NEGATIVE
BUN SERPL-MCNC: 15 MG/DL (ref 8–23)
BUN/CREAT SERPL: 21.7 (ref 7–25)
CALCIUM SPEC-SCNC: 9.3 MG/DL (ref 8.6–10.5)
CHLORIDE SERPL-SCNC: 100 MMOL/L (ref 98–107)
CHOLEST SERPL-MCNC: 134 MG/DL (ref 0–200)
CK SERPL-CCNC: 46 U/L (ref 20–180)
CLARITY UR: CLEAR
CO2 SERPL-SCNC: 25.6 MMOL/L (ref 22–29)
COLOR UR: YELLOW
CREAT SERPL-MCNC: 0.69 MG/DL (ref 0.57–1)
CREAT UR-MCNC: 17.3 MG/DL
DEPRECATED RDW RBC AUTO: 43.1 FL (ref 37–54)
EGFRCR SERPLBLD CKD-EPI 2021: 90.6 ML/MIN/1.73
EOSINOPHIL # BLD AUTO: 0.11 10*3/MM3 (ref 0–0.4)
EOSINOPHIL NFR BLD AUTO: 1.8 % (ref 0.3–6.2)
ERYTHROCYTE [DISTWIDTH] IN BLOOD BY AUTOMATED COUNT: 12.8 % (ref 12.3–15.4)
GLOBULIN UR ELPH-MCNC: 2.6 GM/DL
GLUCOSE SERPL-MCNC: 85 MG/DL (ref 65–99)
GLUCOSE UR STRIP-MCNC: NEGATIVE MG/DL
HBA1C MFR BLD: 6 % (ref 4.8–5.6)
HCT VFR BLD AUTO: 41.1 % (ref 34–46.6)
HDLC SERPL-MCNC: 45 MG/DL (ref 40–60)
HGB BLD-MCNC: 13.6 G/DL (ref 12–15.9)
HGB UR QL STRIP.AUTO: NEGATIVE
HYALINE CASTS UR QL AUTO: NORMAL /LPF
IMM GRANULOCYTES # BLD AUTO: 0.02 10*3/MM3 (ref 0–0.05)
IMM GRANULOCYTES NFR BLD AUTO: 0.3 % (ref 0–0.5)
KETONES UR QL STRIP: NEGATIVE
LDLC SERPL CALC-MCNC: 72 MG/DL (ref 0–100)
LDLC/HDLC SERPL: 1.58 {RATIO}
LEUKOCYTE ESTERASE UR QL STRIP.AUTO: ABNORMAL
LYMPHOCYTES # BLD AUTO: 1.99 10*3/MM3 (ref 0.7–3.1)
LYMPHOCYTES NFR BLD AUTO: 32.7 % (ref 19.6–45.3)
MCH RBC QN AUTO: 30.6 PG (ref 26.6–33)
MCHC RBC AUTO-ENTMCNC: 33.1 G/DL (ref 31.5–35.7)
MCV RBC AUTO: 92.4 FL (ref 79–97)
MICROALBUMIN/CREAT UR: NORMAL MG/G{CREAT}
MONOCYTES # BLD AUTO: 0.43 10*3/MM3 (ref 0.1–0.9)
MONOCYTES NFR BLD AUTO: 7.1 % (ref 5–12)
NEUTROPHILS NFR BLD AUTO: 3.51 10*3/MM3 (ref 1.7–7)
NEUTROPHILS NFR BLD AUTO: 57.8 % (ref 42.7–76)
NITRITE UR QL STRIP: NEGATIVE
NRBC BLD AUTO-RTO: 0 /100 WBC (ref 0–0.2)
PH UR STRIP.AUTO: 7 [PH] (ref 5–8)
PLATELET # BLD AUTO: 195 10*3/MM3 (ref 140–450)
PMV BLD AUTO: 11.4 FL (ref 6–12)
POTASSIUM SERPL-SCNC: 4.5 MMOL/L (ref 3.5–5.2)
PROT SERPL-MCNC: 6.7 G/DL (ref 6–8.5)
PROT UR QL STRIP: NEGATIVE
RBC # BLD AUTO: 4.45 10*6/MM3 (ref 3.77–5.28)
RBC # UR STRIP: NORMAL /HPF
REF LAB TEST METHOD: NORMAL
SODIUM SERPL-SCNC: 137 MMOL/L (ref 136–145)
SP GR UR STRIP: 1.01 (ref 1–1.03)
SQUAMOUS #/AREA URNS HPF: NORMAL /HPF
TRIGL SERPL-MCNC: 90 MG/DL (ref 0–150)
TSH SERPL DL<=0.05 MIU/L-ACNC: 3.69 UIU/ML (ref 0.27–4.2)
UROBILINOGEN UR QL STRIP: ABNORMAL
VLDLC SERPL-MCNC: 17 MG/DL (ref 5–40)
WBC # UR STRIP: NORMAL /HPF
WBC NRBC COR # BLD AUTO: 6.08 10*3/MM3 (ref 3.4–10.8)

## 2024-09-06 PROCEDURE — 83036 HEMOGLOBIN GLYCOSYLATED A1C: CPT

## 2024-09-06 PROCEDURE — 82570 ASSAY OF URINE CREATININE: CPT

## 2024-09-06 PROCEDURE — 82043 UR ALBUMIN QUANTITATIVE: CPT

## 2024-09-06 PROCEDURE — 85025 COMPLETE CBC W/AUTO DIFF WBC: CPT

## 2024-09-06 PROCEDURE — 81001 URINALYSIS AUTO W/SCOPE: CPT

## 2024-09-06 PROCEDURE — 80053 COMPREHEN METABOLIC PANEL: CPT

## 2024-09-06 PROCEDURE — 80061 LIPID PANEL: CPT

## 2024-09-06 PROCEDURE — 82550 ASSAY OF CK (CPK): CPT

## 2024-09-06 PROCEDURE — 84443 ASSAY THYROID STIM HORMONE: CPT

## 2024-09-06 PROCEDURE — 36415 COLL VENOUS BLD VENIPUNCTURE: CPT

## 2024-09-06 PROCEDURE — 82306 VITAMIN D 25 HYDROXY: CPT

## 2024-09-12 ENCOUNTER — OFFICE VISIT (OUTPATIENT)
Dept: INTERNAL MEDICINE | Facility: CLINIC | Age: 74
End: 2024-09-12
Payer: MEDICARE

## 2024-09-12 VITALS
DIASTOLIC BLOOD PRESSURE: 68 MMHG | SYSTOLIC BLOOD PRESSURE: 132 MMHG | HEART RATE: 67 BPM | OXYGEN SATURATION: 98 % | BODY MASS INDEX: 34.25 KG/M2 | WEIGHT: 226 LBS | HEIGHT: 68 IN

## 2024-09-12 DIAGNOSIS — I10 ESSENTIAL HYPERTENSION: Chronic | ICD-10-CM

## 2024-09-12 DIAGNOSIS — E78.2 MIXED HYPERLIPIDEMIA: Chronic | ICD-10-CM

## 2024-09-12 DIAGNOSIS — Z00.00 MEDICARE ANNUAL WELLNESS VISIT, SUBSEQUENT: Primary | ICD-10-CM

## 2024-09-12 DIAGNOSIS — M25.472 PAIN AND SWELLING OF LEFT ANKLE: ICD-10-CM

## 2024-09-12 DIAGNOSIS — R73.01 IMPAIRED FASTING GLUCOSE: Chronic | ICD-10-CM

## 2024-09-12 DIAGNOSIS — E04.2 NON-TOXIC MULTINODULAR GOITER: Chronic | ICD-10-CM

## 2024-09-12 DIAGNOSIS — Z78.0 MENOPAUSE: Chronic | ICD-10-CM

## 2024-09-12 DIAGNOSIS — M25.572 PAIN AND SWELLING OF LEFT ANKLE: ICD-10-CM

## 2024-09-12 RX ORDER — AMLODIPINE BESYLATE 5 MG/1
5 TABLET ORAL DAILY
Qty: 90 TABLET | Refills: 3 | Status: SHIPPED | OUTPATIENT
Start: 2024-09-12

## 2024-09-12 RX ORDER — LISINOPRIL 20 MG/1
20 TABLET ORAL DAILY
Qty: 90 TABLET | Refills: 3 | Status: SHIPPED | OUTPATIENT
Start: 2024-09-12

## 2024-09-12 RX ORDER — LISINOPRIL 20 MG/1
20 TABLET ORAL DAILY
Qty: 90 TABLET | Refills: 3 | OUTPATIENT
Start: 2024-09-12

## 2024-09-12 RX ORDER — ATORVASTATIN CALCIUM 10 MG/1
10 TABLET, FILM COATED ORAL DAILY
Qty: 90 TABLET | Refills: 3 | Status: SHIPPED | OUTPATIENT
Start: 2024-09-12
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
## 2024-10-18 ENCOUNTER — HOSPITAL ENCOUNTER (OUTPATIENT)
Dept: ULTRASOUND IMAGING | Facility: HOSPITAL | Age: 74
Discharge: HOME | End: 2024-10-18
Payer: MEDICARE

## 2024-10-18 DIAGNOSIS — E04.2 NON-TOXIC MULTINODULAR GOITER: Chronic | ICD-10-CM

## 2024-10-18 PROCEDURE — 76536 US EXAM OF HEAD AND NECK: CPT

## 2024-10-23 ENCOUNTER — TELEPHONE (OUTPATIENT)
Dept: INTERNAL MEDICINE | Facility: CLINIC | Age: 74
End: 2024-10-23
Payer: MEDICARE

## 2024-10-23 NOTE — TELEPHONE ENCOUNTER
----- Message from Lisset Godwin sent at 10/23/2024  1:43 AM EDT -----  Thyroid ultrasound is stable; no new nodules.

## 2024-10-23 NOTE — TELEPHONE ENCOUNTER
Called and spoke to pt. Pt verbalized understanding and would like a copy of her results mailed to her as well.    Copy Mailed.

## 2024-11-06 ENCOUNTER — HOSPITAL ENCOUNTER (OUTPATIENT)
Dept: BONE DENSITY | Facility: HOSPITAL | Age: 74
Discharge: HOME | End: 2024-11-06
Admitting: INTERNAL MEDICINE
Payer: MEDICARE

## 2024-11-06 DIAGNOSIS — Z78.0 MENOPAUSE: Chronic | ICD-10-CM

## 2024-11-06 PROCEDURE — 77080 DXA BONE DENSITY AXIAL: CPT

## 2024-11-08 ENCOUNTER — TELEPHONE (OUTPATIENT)
Dept: INTERNAL MEDICINE | Facility: CLINIC | Age: 74
End: 2024-11-08
Payer: MEDICARE

## 2024-11-08 NOTE — TELEPHONE ENCOUNTER
----- Message from Lisset Godwin sent at 11/7/2024  7:49 PM EST -----  Dear Rosalie,    Thank you for obtaining your DEXA (bone density) scan.  I have received those results and would like to review them with you.      Your bone density remains in the osteopenic range (between normal and osteoporosis); however, it is worsened compared to your last DEXA in 2021.     The values indicate that your risk of a major fracture in the next 10 years is 18%. Treatment is recommended when this value is 20% or higher.    Please maintain regular calcium and vitamin D supplementation.  Calcium intake should be 1000mg per day between diet and supplements.  Weight bearing exercise is good for bone health as well.    We should plan to repeat your DEXA in 2 years.  Please let me know if you have any questions or concerns regarding these results.         Sincerely,  Lisset Godwin MD, FACP

## 2024-11-26 ENCOUNTER — OFFICE VISIT (OUTPATIENT)
Dept: INTERNAL MEDICINE | Facility: CLINIC | Age: 74
End: 2024-11-26
Payer: MEDICARE

## 2024-11-26 ENCOUNTER — TELEPHONE (OUTPATIENT)
Dept: INTERNAL MEDICINE | Facility: CLINIC | Age: 74
End: 2024-11-26

## 2024-11-26 VITALS
HEART RATE: 99 BPM | HEIGHT: 68 IN | DIASTOLIC BLOOD PRESSURE: 62 MMHG | TEMPERATURE: 99.8 F | RESPIRATION RATE: 18 BRPM | OXYGEN SATURATION: 99 % | WEIGHT: 229 LBS | SYSTOLIC BLOOD PRESSURE: 146 MMHG | BODY MASS INDEX: 34.71 KG/M2

## 2024-11-26 DIAGNOSIS — I10 ESSENTIAL HYPERTENSION: ICD-10-CM

## 2024-11-26 DIAGNOSIS — H61.23 BILATERAL IMPACTED CERUMEN: ICD-10-CM

## 2024-11-26 DIAGNOSIS — J02.9 SORE THROAT: ICD-10-CM

## 2024-11-26 DIAGNOSIS — J06.9 UPPER RESPIRATORY TRACT INFECTION, UNSPECIFIED TYPE: Primary | ICD-10-CM

## 2024-11-26 LAB
EXPIRATION DATE: NORMAL
EXPIRATION DATE: NORMAL
FLUAV AG UPPER RESP QL IA.RAPID: NOT DETECTED
FLUBV AG UPPER RESP QL IA.RAPID: NOT DETECTED
INTERNAL CONTROL: NORMAL
INTERNAL CONTROL: NORMAL
Lab: NORMAL
Lab: NORMAL
S PYO AG THROAT QL: NEGATIVE
SARS-COV-2 AG UPPER RESP QL IA.RAPID: NOT DETECTED

## 2024-11-26 PROCEDURE — 99214 OFFICE O/P EST MOD 30 MIN: CPT

## 2024-11-26 PROCEDURE — 3078F DIAST BP <80 MM HG: CPT

## 2024-11-26 PROCEDURE — 87880 STREP A ASSAY W/OPTIC: CPT

## 2024-11-26 PROCEDURE — 87428 SARSCOV & INF VIR A&B AG IA: CPT

## 2024-11-26 PROCEDURE — 1125F AMNT PAIN NOTED PAIN PRSNT: CPT

## 2024-11-26 PROCEDURE — 3077F SYST BP >= 140 MM HG: CPT

## 2024-11-26 RX ORDER — NYSTATIN 100000 U/G
1 CREAM TOPICAL AS NEEDED
COMMUNITY
Start: 2024-09-23

## 2024-11-26 NOTE — TELEPHONE ENCOUNTER
Caller: Rosalie Amador    Relationship: Self    Best call back number: [phone]    What is the best time to reach you: ANYTIME     Who are you requesting to speak with (clinical staff, provider,  specific staff member): DOCTOR OR NURSE     What was the call regarding: THE PATIENT STATES THAT SHE HAS SORE THROAT HEAD CONGESTION SHE WANTS TO KNOW IF SHE CAN TAKE CORISIDINE HP AND IF SHE CAN'T TAKE THAT WITH THE MUSINEX

## 2024-11-26 NOTE — TELEPHONE ENCOUNTER
Scheduled patient an appointment with Rajwinder king [facility]

## 2024-11-26 NOTE — PROGRESS NOTES
Follow Up Office Visit      Date: 2024  Patient Name: Rosalie Amador  : 1950   MRN: [id number]     Chief Complaint:    Chief Complaint   Patient presents with    Sore Throat    Hoarse    Nasal Congestion       History of Present Illness: Rosalie Amador is a 74 y.o. female who is here today for follow up with congestion with rhinorrhea and sore throat that onset early this morning around 0400 upon waking up.   recently sick with similar symptoms.  This morning patient took Mucinex and Chloraseptic drop with moderate relief of symptoms.  No additional associated symptoms.  She has questions regarding what cough medicine she may take with her history of high blood pressure.    Subjective      Review of Systems:   Review of Systems   Constitutional:  Negative for chills, fatigue and fever.   HENT:  Positive for congestion, rhinorrhea and sore throat.    Respiratory:  Negative for cough and shortness of breath.    Cardiovascular:  Negative for chest pain.   Gastrointestinal:  Negative for abdominal pain, diarrhea, nausea and vomiting.   Musculoskeletal:  Negative for myalgias.   Neurological:  Negative for light-headedness and headache.       Medications:     Current Outpatient Medications:     amLODIPine (NORVASC) 5 MG tablet, Take 1 tablet by mouth Daily., Disp: 90 tablet, Rfl: 3    aspirin 81 MG tablet, Take  by mouth daily., Disp: , Rfl:     atorvastatin (LIPITOR) 10 MG tablet, Take 1 tablet by mouth Daily., Disp: 90 tablet, Rfl: 3    Cholecalciferol (VITAMIN D) 2000 units capsule, Take  by mouth Daily., Disp: , Rfl:     Coenzyme Q10 (COQ-10) 200 MG capsule, Take  by mouth daily., Disp: , Rfl:     lisinopril (PRINIVIL,ZESTRIL) 20 MG tablet, Take 1 tablet by mouth Daily., Disp: 90 tablet, Rfl: 3    Multiple Vitamin (MULTIVITAMINS PO), Take  by mouth daily., Disp: , Rfl:     nystatin (MYCOSTATIN) 303292 UNIT/GM cream, Apply 1 Application topically to the appropriate area as directed As Needed.,  "Disp: , Rfl:     ondansetron (ZOFRAN) 4 MG tablet, Take 1 tablet by mouth Every 6 (Six) Hours., Disp: 6 tablet, Rfl: 0    Zinc Sulfate (ZINC 15 PO), Take  by mouth., Disp: , Rfl:     Allergies:   Allergies   Allergen Reactions    Morphine And Codeine Hallucinations    Benzonatate Headache       Objective     Physical Exam:  Vital Signs:   Vitals:    11/26/24 1457   BP: 146/62   Pulse: 99   Resp: 18   Temp: 99.8 °F (37.7 °C)   TempSrc: Temporal   SpO2: 99%   Weight: 104 kg (229 lb)   Height: 171.5 cm (67.52\")   PainSc: 3  Comment: 10 Prior to the Chloraseptic mouth drops   PainLoc: Throat     Body mass index is 35.32 kg/m².   Facility age limit for growth %tayo is 20 years.       Physical Exam  Vitals and nursing note reviewed.   Constitutional:       General: She is not in acute distress.     Appearance: Normal appearance.   HENT:      Right Ear: There is impacted cerumen.      Left Ear: There is impacted cerumen.      Ears:      Comments: Repeat exam with unremarkable TMs and canals bilaterally     Nose: Congestion present.      Right Turbinates: Not enlarged.      Left Turbinates: Not enlarged.      Right Sinus: No maxillary sinus tenderness or frontal sinus tenderness.      Left Sinus: No maxillary sinus tenderness or frontal sinus tenderness.      Mouth/Throat:      Pharynx: No oropharyngeal exudate or posterior oropharyngeal erythema.   Eyes:      Extraocular Movements: Extraocular movements intact.      Conjunctiva/sclera: Conjunctivae normal.   Cardiovascular:      Rate and Rhythm: Normal rate and regular rhythm.      Pulses: Normal pulses.      Heart sounds: Normal heart sounds.   Pulmonary:      Effort: Pulmonary effort is normal. No respiratory distress.      Breath sounds: Normal breath sounds. No wheezing, rhonchi or rales.   Neurological:      General: No focal deficit present.      Mental Status: She is alert and oriented to person, place, and time. Mental status is at baseline.   Psychiatric:         " Mood and Affect: Mood normal.         Behavior: Behavior normal.       Ear Cerumen Removal    Date/Time: 11/27/2024 3:40 PM    Performed by: Rajwinder Rosales PA-C  Authorized by: Rajwinder Rosales PA-C  Consent: Verbal consent obtained.  Risks and benefits: risks, benefits and alternatives were discussed  Consent given by: patient  Patient understanding: patient states understanding of the procedure being performed  Patient consent: the patient's understanding of the procedure matches consent given  Location details: left ear and right ear  Patient tolerance: patient tolerated the procedure well with no immediate complications  Procedure type: instrumentation, irrigation          POCT Results (if applicable):   Results for orders placed or performed in visit on 11/26/24   POCT rapid strep A    Collection Time: 11/26/24  4:14 PM    Specimen: Swab   Result Value Ref Range    Rapid Strep A Screen Negative Negative, VALID, INVALID, Not Performed    Internal Control Passed Passed    Lot Number [id number]     Expiration Date 01/03/27    POCT SARS-CoV-2 Antigen BOBBY + Flu    Collection Time: 11/26/24  4:19 PM    Specimen: Swab   Result Value Ref Range    SARS Antigen Not Detected Not Detected, Presumptive Negative    Influenza A Antigen BOBBY Not Detected Not Detected    Influenza B Antigen BOBBY Not Detected Not Detected    Internal Control Passed Passed    Lot Number [id number]     Expiration Date 09/04/25          Assessment / Plan      Assessment/Plan:   Diagnoses and all orders for this visit:    1. Upper respiratory tract infection, unspecified type (Primary)    2. Sore throat  -     POCT rapid strep A  -     POCT SARS-CoV-2 Antigen BOBBY + Flu    3. Bilateral impacted cerumen  -     Ear Cerumen Removal    4. Hypertension      PLAN:  - POC covid/flu/strep negative  - Recommend antihistamine (claritin, zyrtec, or allegra) for runny nose, flonase and nasal saline rinses for nasal congestion, mucinex for cough and chest  congestion (coricidin also okay to take), tylenol as needed for fever or headaches, lots of rest, and lots of water.     - BP elevated, likely situational.  Recommend patient continue monitoring at home and return to the office if readings persistently greater than 130/80  - Return to the office if symptoms worsen or fail to improve with current plan     Follow Up:   Return for Next scheduled follow up, Follow up with Dr. Godwin.      Rajwinder Rosales PA-C    [facility]

## 2024-11-27 PROCEDURE — 69210 REMOVE IMPACTED EAR WAX UNI: CPT

## 2025-03-23 NOTE — PROGRESS NOTES
"Chief Complaint   Patient presents with    Impaired fasting glucose    Hypertension       History of Present Illness  74 y.o.  female presents for f/u on sugars and BP. Home BPs have been 110-120s/60s.    Inquiring about 2nd COVID19 vacc and measles.    Brother-in-law has stage 4 colon CA, undergoing chemo currently.    Review of Systems  Denies CP, SOB, falls. All other ROS reviewed and negative.    Current Outpatient Medications:     amLODIPine 5 MG QD    aspirin 81 MG QD    atorvastatin  10 MG QD    Cholecalciferol (VITAMIN D) 2000 units QD    Coenzyme Q10 200 MG QD    lisinopril 20 MG QD    Multiple Vitamin QD    nystatin (MYCOSTATIN) 738409 UNIT/GM cream AD    ondansetron 4 MG prn    Zinc Sulfate QD    VITALS:  /66   Pulse 85   Ht 171.5 cm (67.5\")   Wt 103 kg (226 lb 3.2 oz)   SpO2 95%   BMI 34.91 kg/m²     Physical Exam  Vitals and nursing note reviewed.   Constitutional:       General: She is not in acute distress.     Appearance: Normal appearance. She is not ill-appearing.   Eyes:      Extraocular Movements: Extraocular movements intact.      Conjunctiva/sclera: Conjunctivae normal.   Pulmonary:      Effort: Pulmonary effort is normal. No respiratory distress.   Neurological:      Mental Status: She is alert. Mental status is at baseline.   Psychiatric:         Mood and Affect: Mood normal.         Behavior: Behavior normal.         LABS  Results for orders placed or performed in visit on 03/25/25   POC Glycosylated Hemoglobin (Hb A1C)    Collection Time: 03/25/25  9:15 AM    Specimen: Blood   Result Value Ref Range    Hemoglobin A1C 5.7 4.5 - 5.7 %    Lot Number [id number]     Expiration Date 11/8/2026 9/24/24 A1C 5.5    ASSESSMENT/PLAN    Diagnoses and all orders for this visit:    1. Impaired fasting glucose (Primary)  Assessment & Plan:  BG control stable with A1C 65.7; encouraged reg phys activity to decr insulin resistance, moderation in unhealthy starches/sweets; f/u A1C in 6 " mos with next wellness      Orders:  -     POC Glycosylated Hemoglobin (Hb A1C)    2. Hypertension  Assessment & Plan:  BP mildly elevated but patient reports normotensive readings at home; cont lisin 20mg QD and amlo 5mg QD; cont home BP monitoring with goal < 130/80; f/u in 6 mos      3. Vaccine counseling  Comments:  pt born before 1958, therefore immune for measles; advised that she can proceed with 2nd shot COVID19 vacc if she chooses        FOLLOW-UP  Health maintenance - flu vacc and COVID19 vacc done for this season; RSV vacc prev completed  RTC for AWV 9/16/25; fasting labs prior to appt (CBC, CMP, TSH, lipids, UA/micr, microalb/Cr, vit D, A1C, CPK)    Electronically signed by:    Lisset Godwin MD, FACP  03/25/2025

## 2025-03-25 ENCOUNTER — OFFICE VISIT (OUTPATIENT)
Dept: INTERNAL MEDICINE | Facility: CLINIC | Age: 75
End: 2025-03-25
Payer: MEDICARE

## 2025-03-25 VITALS
HEIGHT: 68 IN | BODY MASS INDEX: 34.28 KG/M2 | DIASTOLIC BLOOD PRESSURE: 66 MMHG | SYSTOLIC BLOOD PRESSURE: 134 MMHG | OXYGEN SATURATION: 95 % | HEART RATE: 85 BPM | WEIGHT: 226.2 LBS

## 2025-03-25 DIAGNOSIS — R73.01 IMPAIRED FASTING GLUCOSE: Primary | Chronic | ICD-10-CM

## 2025-03-25 DIAGNOSIS — I10 ESSENTIAL HYPERTENSION: Chronic | ICD-10-CM

## 2025-03-25 DIAGNOSIS — Z71.85 VACCINE COUNSELING: ICD-10-CM

## 2025-03-25 LAB
EXPIRATION DATE: NORMAL
HBA1C MFR BLD: 5.7 % (ref 4.5–5.7)
Lab: NORMAL

## 2025-03-25 NOTE — ASSESSMENT & PLAN NOTE
BP mildly elevated but patient reports normotensive readings at home; cont lisin 20mg QD and amlo 5mg QD; cont home BP monitoring with goal < 130/80; f/u in 6 mos

## 2025-03-25 NOTE — ASSESSMENT & PLAN NOTE
BG control stable with A1C 65.7; encouraged reg phys activity to decr insulin resistance, moderation in unhealthy starches/sweets; f/u A1C in 6 mos with next wellness

## 2025-08-22 DIAGNOSIS — E78.2 MIXED HYPERLIPIDEMIA: Chronic | ICD-10-CM

## 2025-08-22 DIAGNOSIS — R73.01 IMPAIRED FASTING GLUCOSE: Chronic | ICD-10-CM

## 2025-08-22 DIAGNOSIS — E55.9 VITAMIN D DEFICIENCY: Chronic | ICD-10-CM

## 2025-08-22 DIAGNOSIS — Z00.00 MEDICARE ANNUAL WELLNESS VISIT, SUBSEQUENT: Primary | Chronic | ICD-10-CM
